# Patient Record
Sex: FEMALE | Race: WHITE | Employment: OTHER | URBAN - METROPOLITAN AREA
[De-identification: names, ages, dates, MRNs, and addresses within clinical notes are randomized per-mention and may not be internally consistent; named-entity substitution may affect disease eponyms.]

---

## 2017-01-16 ENCOUNTER — HOSPITAL ENCOUNTER (OUTPATIENT)
Dept: BONE DENSITY | Facility: HOSPITAL | Age: 57
Discharge: HOME | End: 2017-01-16
Attending: INTERNAL MEDICINE
Payer: COMMERCIAL

## 2017-01-16 ENCOUNTER — HOSPITAL ENCOUNTER (OUTPATIENT)
Dept: CV DIAGNOSTICS | Facility: HOSPITAL | Age: 57
Discharge: HOME | End: 2017-01-16
Attending: INTERNAL MEDICINE
Payer: COMMERCIAL

## 2017-01-16 DIAGNOSIS — M85.80 OSTEOPENIA: ICD-10-CM

## 2017-01-16 DIAGNOSIS — R00.2 PALPITATION: ICD-10-CM

## 2017-01-16 PROCEDURE — 93306 TTE W/DOPPLER COMPLETE: CPT | Performed by: INTERNAL MEDICINE

## 2017-01-16 PROCEDURE — 77080 DXA BONE DENSITY AXIAL: CPT

## 2017-01-16 PROCEDURE — 93306 TTE W/DOPPLER COMPLETE: CPT

## 2017-01-17 ENCOUNTER — TELEPHONE (OUTPATIENT)
Dept: INTERNAL MEDICINE CLINIC | Facility: CLINIC | Age: 57
End: 2017-01-17

## 2017-01-17 NOTE — TELEPHONE ENCOUNTER
Patient called to follow up on dexa scan and echo results. Per patient ok to leave detailed message if unable to answer. Also, patient had mammogram from [facility] two weeks ago.

## 2017-01-17 NOTE — TELEPHONE ENCOUNTER
Mammogram is benign without changes from prior. Would recheck in 1 year continue self breast exams every month. Echo do not have results back on. Bone density see prior telephone message.

## 2017-02-17 RX ORDER — PRAVASTATIN SODIUM 40 MG
TABLET ORAL
Qty: 90 TABLET | Refills: 0 | Status: SHIPPED | OUTPATIENT
Start: 2017-02-17 | End: 2017-06-21

## 2017-03-31 RX ORDER — TRIAMTERENE AND HYDROCHLOROTHIAZIDE 37.5; 25 MG/1; MG/1
TABLET ORAL
Qty: 45 TABLET | Refills: 1 | Status: SHIPPED | OUTPATIENT
Start: 2017-03-31 | End: 2017-05-15

## 2017-05-15 ENCOUNTER — OFFICE VISIT (OUTPATIENT)
Dept: INTERNAL MEDICINE CLINIC | Facility: CLINIC | Age: 57
End: 2017-05-15

## 2017-05-15 VITALS
TEMPERATURE: 98 F | BODY MASS INDEX: 25.84 KG/M2 | DIASTOLIC BLOOD PRESSURE: 58 MMHG | OXYGEN SATURATION: 96 % | HEIGHT: 64 IN | HEART RATE: 68 BPM | SYSTOLIC BLOOD PRESSURE: 118 MMHG | WEIGHT: 151.38 LBS

## 2017-05-15 DIAGNOSIS — E55.9 VITAMIN D DEFICIENCY: Primary | ICD-10-CM

## 2017-05-15 DIAGNOSIS — E78.00 HYPERCHOLESTEROLEMIA: ICD-10-CM

## 2017-05-15 DIAGNOSIS — I10 HYPERTENSION, BENIGN: ICD-10-CM

## 2017-05-15 DIAGNOSIS — M79.604 PAIN IN BOTH LOWER EXTREMITIES: ICD-10-CM

## 2017-05-15 DIAGNOSIS — M79.605 PAIN IN BOTH LOWER EXTREMITIES: ICD-10-CM

## 2017-05-15 PROCEDURE — 99214 OFFICE O/P EST MOD 30 MIN: CPT | Performed by: INTERNAL MEDICINE

## 2017-05-15 PROCEDURE — 99212 OFFICE O/P EST SF 10 MIN: CPT | Performed by: INTERNAL MEDICINE

## 2017-05-15 RX ORDER — TIZANIDINE 2 MG/1
2 TABLET ORAL NIGHTLY
Qty: 7 TABLET | Refills: 0 | Status: SHIPPED | OUTPATIENT
Start: 2017-05-15 | End: 2017-05-22

## 2017-05-15 RX ORDER — ATENOLOL 25 MG/1
TABLET ORAL
Qty: 180 TABLET | Refills: 1 | Status: SHIPPED | OUTPATIENT
Start: 2017-05-15 | End: 2018-08-28

## 2017-05-15 RX ORDER — TRIAMTERENE AND HYDROCHLOROTHIAZIDE 37.5; 25 MG/1; MG/1
TABLET ORAL
Qty: 45 TABLET | Refills: 1 | Status: SHIPPED | OUTPATIENT
Start: 2017-05-15 | End: 2018-04-11

## 2017-05-15 NOTE — PATIENT INSTRUCTIONS
ASSESSMENT/PLAN:   Vitamin d deficiency  (primary encounter diagnosis)    Hypertension, benign Good control. Careful with diet and excercise at least 30 minutes 3-4 times a week. Check blood pressures at different times on different days.  Can purchase ow

## 2017-05-15 NOTE — PROGRESS NOTES
HPI:    Patient ID: Noreen Pace is a 62year old female. HPI  Constant feeling pressure in legs. H/O back. No assoc. With time of day. Hypertension  Patient is here for follow up of hypertension. BP at home: not check.    Has been compliant with m Triamterene-HCTZ 37.5-25 MG Oral Tab TAKE 1/4 TABLET BY MOUTH DAILY Disp: 45 tablet Rfl: 1   Diclofenac Sodium 50 MG Oral Tab EC Take 1 tablet (50 mg total) by mouth 2 (two) times daily.  Disp: 14 tablet Rfl: 0   TiZANidine HCl 2 MG Oral Tab Take 1 tablet normal and breath sounds normal. No accessory muscle usage. No apnea, no tachypnea and no bradypnea. No respiratory distress. She has no decreased breath sounds. She has no wheezes. She has no rhonchi. She has no rales. She exhibits no tenderness.    Muscul Bake foods more and grill occasionally. Avoid fried foods. No salt. Use other seasonings. Hypercholesterolemia    Pain in both lower extremities Check venous U/S. Try diclofenac 50 mg 1 twice a day with food for 7 days.   Try tizanidine 2 mg 1 at night

## 2017-05-16 ENCOUNTER — TELEPHONE (OUTPATIENT)
Dept: INTERNAL MEDICINE CLINIC | Facility: CLINIC | Age: 57
End: 2017-05-16

## 2017-05-16 ENCOUNTER — HOSPITAL ENCOUNTER (OUTPATIENT)
Dept: ULTRASOUND IMAGING | Facility: HOSPITAL | Age: 57
Discharge: HOME | End: 2017-05-16
Attending: INTERNAL MEDICINE
Payer: COMMERCIAL

## 2017-05-16 DIAGNOSIS — M79.604 PAIN IN BOTH LOWER EXTREMITIES: ICD-10-CM

## 2017-05-16 DIAGNOSIS — M79.605 PAIN IN BOTH LOWER EXTREMITIES: ICD-10-CM

## 2017-05-16 PROCEDURE — 93970 EXTREMITY STUDY: CPT | Performed by: INTERNAL MEDICINE

## 2017-05-16 NOTE — TELEPHONE ENCOUNTER
Followed up with patient to ensure patient understood next steps. Pt verbalized understanding that she understood the plans that her and Dr. Leola Long discussed at her apt yesterday. Patient had not further questions or concerns at this time.

## 2017-05-16 NOTE — TELEPHONE ENCOUNTER
Advised patient she could go home and that we will follow up later this afternoon when Dr. Radhika Friedman is in the office and is able to review results.

## 2017-06-21 NOTE — TELEPHONE ENCOUNTER
Patient does not meet criteria for refill protocol for pravastatin last lipid 2015. Please advise if you will refill?

## 2017-06-22 RX ORDER — PRAVASTATIN SODIUM 40 MG
TABLET ORAL
Qty: 90 TABLET | Refills: 0 | Status: SHIPPED | OUTPATIENT
Start: 2017-06-22 | End: 2017-10-09

## 2017-07-24 RX ORDER — ACYCLOVIR 200 MG/1
CAPSULE ORAL
Qty: 21 CAPSULE | Refills: 0 | Status: SHIPPED | OUTPATIENT
Start: 2017-07-24 | End: 2018-08-28

## 2017-08-11 ENCOUNTER — TELEPHONE (OUTPATIENT)
Dept: INTERNAL MEDICINE CLINIC | Facility: CLINIC | Age: 57
End: 2017-08-11

## 2017-08-11 RX ORDER — ATENOLOL 25 MG/1
TABLET ORAL
Qty: 180 TABLET | Refills: 0 | Status: SHIPPED | OUTPATIENT
Start: 2017-08-11 | End: 2017-09-22

## 2017-08-11 NOTE — TELEPHONE ENCOUNTER
Last CMP 12/2016 in media Scanned.  Rx approved per protocol        Hypertensive Medications  Protocol Criteria:  · Appointment scheduled in the past 6 months or in the next 3 months  · BMP or CMP in the past 12 months  · Creatinine result < 2  Recent Outpa

## 2017-08-15 NOTE — TELEPHONE ENCOUNTER
Does pt. Have enough til then? or change to toprol XL 25 q12hrs. #60 and Careful with diet and excercise at least 30 minutes 3-4 times a week. Check blood pressures at different times on different days. Can purchase own blood pressure monitor.  If not, [facility]

## 2017-08-16 ENCOUNTER — TELEPHONE (OUTPATIENT)
Dept: INTERNAL MEDICINE CLINIC | Facility: CLINIC | Age: 57
End: 2017-08-16

## 2017-08-16 NOTE — TELEPHONE ENCOUNTER
Spoke with patient, she is unsure if she has enough to wait out the shortage however she will check her supply. She does not want to change medications.

## 2017-08-18 NOTE — TELEPHONE ENCOUNTER
Received call from [facility] re: Atenolol prescription. Informed them of patients' decision, asked pharmacy to call patient directly for f/u.

## 2017-08-22 NOTE — TELEPHONE ENCOUNTER
She wants to wait and check and call us if she is running like a week late with medicines we can probably end up changing it just temporary.

## 2017-08-22 NOTE — TELEPHONE ENCOUNTER
Patient informed of PCP instructions, per patient is on a wait list at [address] [place] [phone] but will call office if she has a week left of the medication to temporarily change. Patient denied any further questions at the time of call.

## 2017-09-08 ENCOUNTER — TELEPHONE (OUTPATIENT)
Dept: INTERNAL MEDICINE CLINIC | Facility: CLINIC | Age: 57
End: 2017-09-08

## 2017-09-08 RX ORDER — METOPROLOL SUCCINATE 25 MG/1
TABLET, EXTENDED RELEASE ORAL
Qty: 180 TABLET | Refills: 1 | Status: SHIPPED | OUTPATIENT
Start: 2017-09-08 | End: 2018-08-28

## 2017-09-08 RX ORDER — METOPROLOL SUCCINATE 25 MG/1
25 TABLET, EXTENDED RELEASE ORAL EVERY 12 HOURS
Qty: 60 TABLET | Refills: 1 | Status: SHIPPED | OUTPATIENT
Start: 2017-09-08 | End: 2017-09-08

## 2017-09-08 NOTE — TELEPHONE ENCOUNTER
Per pt requesting alternative to Atenolol. Pt states medication is still on back order.  Please advise

## 2017-09-08 NOTE — TELEPHONE ENCOUNTER
See prior message om 8-11-17 regarding changeing to toprol XL 25 Q12hrs. #60 with 1 Rf and F/U in 4 weeks. Careful with diet and excercise at least 30 minutes 3-4 times a week. Check blood pressures at different times on different days.  Can purchase own bl

## 2017-09-08 NOTE — TELEPHONE ENCOUNTER
Informed pt Dr. Isrrael Sales recommendations below. Pt verbalized understanding. Rx sent to the preferred pharmacy. No further questions or concerns.

## 2017-09-19 RX ORDER — PRAVASTATIN SODIUM 40 MG
40 TABLET ORAL
Qty: 90 TABLET | Refills: 0 | OUTPATIENT
Start: 2017-09-19

## 2017-09-19 NOTE — TELEPHONE ENCOUNTER
Dr. Viveros Ba, Rx request for Pravastatin Sodium 40mg FAILED Cholesterol Protocol. Please review.  Thank you    Cholesterol Medications  Protocol Criteria:  · Appointment scheduled in the past 12 months or in the next 3 months  · ALT & LDL on file in the past

## 2017-09-22 ENCOUNTER — TELEPHONE (OUTPATIENT)
Dept: INTERNAL MEDICINE CLINIC | Facility: CLINIC | Age: 57
End: 2017-09-22

## 2017-09-22 DIAGNOSIS — E53.8 VITAMIN B 12 DEFICIENCY: ICD-10-CM

## 2017-09-22 DIAGNOSIS — E78.00 HYPERCHOLESTEROLEMIA: Primary | ICD-10-CM

## 2017-09-22 DIAGNOSIS — Z00.00 ADULT GENERAL MEDICAL EXAMINATION: ICD-10-CM

## 2017-09-22 RX ORDER — ATENOLOL 25 MG/1
TABLET ORAL
Qty: 180 TABLET | Refills: 0 | Status: SHIPPED | OUTPATIENT
Start: 2017-09-22 | End: 2017-11-09

## 2017-09-22 NOTE — TELEPHONE ENCOUNTER
She found a pharmacy that has this medication on stock  Can you please send it to [facility] in [address]  [phone]      Current Outpatient Prescriptions:     •    ATENOLOL 25 MG Oral Tab, TAKE 1 TABLET BY MOUTH EVERY 12 HOURS, Disp: 180 tablet, Rfl: 0  •

## 2017-09-22 NOTE — TELEPHONE ENCOUNTER
Pt called requesting a trusted female dermatologist; someone that you would go to. Also, can you please place orders for cholesterol.

## 2017-09-27 RX ORDER — TRIAMTERENE AND HYDROCHLOROTHIAZIDE 37.5; 25 MG/1; MG/1
TABLET ORAL
Qty: 45 TABLET | Refills: 0 | Status: SHIPPED | OUTPATIENT
Start: 2017-09-27 | End: 2018-01-05

## 2017-09-27 NOTE — TELEPHONE ENCOUNTER
Dr. Aaron Lu, Rx request for Triamterene-HCTZ 37.5-25mg, FAILED Hypertension Protocol. UNABLE to fill. Please review. Thank you.     Hypertensive Medications  Protocol Criteria:  · Appointment scheduled in the past 6 months or in the next 3 months  · BMP o

## 2017-09-29 ENCOUNTER — APPOINTMENT (OUTPATIENT)
Dept: LAB | Facility: HOSPITAL | Age: 57
End: 2017-09-29
Attending: INTERNAL MEDICINE
Payer: COMMERCIAL

## 2017-09-29 DIAGNOSIS — E53.8 VITAMIN B 12 DEFICIENCY: ICD-10-CM

## 2017-09-29 DIAGNOSIS — E78.00 HYPERCHOLESTEROLEMIA: ICD-10-CM

## 2017-09-29 DIAGNOSIS — Z00.00 ADULT GENERAL MEDICAL EXAMINATION: ICD-10-CM

## 2017-09-29 PROCEDURE — 82607 VITAMIN B-12: CPT

## 2017-09-29 PROCEDURE — 86803 HEPATITIS C AB TEST: CPT

## 2017-09-29 PROCEDURE — 80053 COMPREHEN METABOLIC PANEL: CPT

## 2017-09-29 PROCEDURE — 80500 HEPATITIS A B + C PROFILE: CPT

## 2017-09-29 PROCEDURE — 80061 LIPID PANEL: CPT

## 2017-09-29 PROCEDURE — 86708 HEPATITIS A ANTIBODY: CPT

## 2017-09-29 PROCEDURE — 86704 HEP B CORE ANTIBODY TOTAL: CPT

## 2017-09-29 PROCEDURE — 36415 COLL VENOUS BLD VENIPUNCTURE: CPT

## 2017-09-29 PROCEDURE — 87340 HEPATITIS B SURFACE AG IA: CPT

## 2017-09-29 PROCEDURE — 86706 HEP B SURFACE ANTIBODY: CPT

## 2017-10-09 RX ORDER — PRAVASTATIN SODIUM 40 MG
40 TABLET ORAL
Qty: 90 TABLET | Refills: 0 | Status: SHIPPED | OUTPATIENT
Start: 2017-10-09 | End: 2018-01-05

## 2017-10-10 ENCOUNTER — TELEPHONE (OUTPATIENT)
Dept: INTERNAL MEDICINE CLINIC | Facility: CLINIC | Age: 57
End: 2017-10-10

## 2017-10-10 NOTE — TELEPHONE ENCOUNTER
Can't make it to her B12 inject this week it all.   Pt would like to have an rx for a b12 so she can take it to [facility]

## 2017-10-11 NOTE — TELEPHONE ENCOUNTER
Spoke with pt. She inquires if she can receive B12 at the [facility] clinic. Advised yes, transferred for nurse visit scheduling.

## 2017-10-13 ENCOUNTER — LAB ENCOUNTER (OUTPATIENT)
Dept: LAB | Facility: HOSPITAL | Age: 57
End: 2017-10-13
Attending: INTERNAL MEDICINE
Payer: COMMERCIAL

## 2017-10-13 DIAGNOSIS — R20.2 NUMBNESS AND TINGLING OF BOTH LEGS: ICD-10-CM

## 2017-10-13 DIAGNOSIS — R20.0 NUMBNESS AND TINGLING OF BOTH LEGS: ICD-10-CM

## 2017-10-20 ENCOUNTER — TELEPHONE (OUTPATIENT)
Dept: INTERNAL MEDICINE CLINIC | Facility: CLINIC | Age: 57
End: 2017-10-20

## 2017-10-20 RX ORDER — ACYCLOVIR 200 MG/1
CAPSULE ORAL
Qty: 90 CAPSULE | Refills: 1 | Status: SHIPPED | OUTPATIENT
Start: 2017-10-20 | End: 2018-11-23

## 2017-10-20 NOTE — TELEPHONE ENCOUNTER
Please advise regarding refill request. rx pending for review.      Refill Protocol Appointment Criteria  · Appointment scheduled in the past 6 months or in the next 3 months  Recent Outpatient Visits            5 months ago Vitamin D deficiency    [facility]

## 2017-10-20 NOTE — TELEPHONE ENCOUNTER
Current Outpatient Prescriptions:     •  ACYCLOVIR 200 MG Oral Cap, TAKE ONE CAPSULE BY MOUTH THREE TIMES DAILY, Disp: 21 capsule, Rfl: 0    Patient is in [place] requesting refill to be sent to that location ph# 8 Claudine Carreno

## 2017-11-09 RX ORDER — ATENOLOL 25 MG/1
TABLET ORAL
Qty: 180 TABLET | Refills: 0 | Status: SHIPPED | OUTPATIENT
Start: 2017-11-09 | End: 2018-02-06

## 2017-12-20 ENCOUNTER — TELEPHONE (OUTPATIENT)
Dept: INTERNAL MEDICINE CLINIC | Facility: CLINIC | Age: 57
End: 2017-12-20

## 2017-12-20 DIAGNOSIS — E53.8 VITAMIN B 12 DEFICIENCY: Primary | ICD-10-CM

## 2017-12-20 DIAGNOSIS — E78.00 HYPERCHOLESTEROLEMIA: ICD-10-CM

## 2017-12-20 RX ORDER — PIMECROLIMUS 10 MG/G
CREAM TOPICAL
COMMUNITY
Start: 2016-12-02 | End: 2018-08-28

## 2017-12-20 RX ORDER — HYDROCODONE BITARTRATE AND ACETAMINOPHEN 5; 325 MG/1; MG/1
1-2 TABLET ORAL
COMMUNITY
Start: 2014-10-12 | End: 2018-08-28

## 2017-12-20 RX ORDER — DICYCLOMINE HCL 20 MG
20 TABLET ORAL
COMMUNITY
Start: 2014-10-12 | End: 2018-08-28

## 2017-12-20 NOTE — TELEPHONE ENCOUNTER
Patient calling has follow up appointment scheduled in Feb, 2018 requesting order for labs as needed.  Ok to leave message

## 2017-12-22 ENCOUNTER — APPOINTMENT (OUTPATIENT)
Dept: LAB | Facility: HOSPITAL | Age: 57
End: 2017-12-22
Attending: INTERNAL MEDICINE
Payer: COMMERCIAL

## 2017-12-22 DIAGNOSIS — E78.00 HYPERCHOLESTEROLEMIA: ICD-10-CM

## 2017-12-22 DIAGNOSIS — E53.8 VITAMIN B 12 DEFICIENCY: ICD-10-CM

## 2017-12-22 PROBLEM — E87.6 HYPOKALEMIA: Status: ACTIVE | Noted: 2017-12-22

## 2017-12-22 PROCEDURE — 36415 COLL VENOUS BLD VENIPUNCTURE: CPT

## 2017-12-22 PROCEDURE — 82607 VITAMIN B-12: CPT

## 2017-12-22 PROCEDURE — 80053 COMPREHEN METABOLIC PANEL: CPT

## 2017-12-22 PROCEDURE — 80061 LIPID PANEL: CPT

## 2017-12-22 PROCEDURE — 83735 ASSAY OF MAGNESIUM: CPT | Performed by: INTERNAL MEDICINE

## 2018-01-05 ENCOUNTER — TELEPHONE (OUTPATIENT)
Dept: INTERNAL MEDICINE CLINIC | Facility: CLINIC | Age: 58
End: 2018-01-05

## 2018-01-05 ENCOUNTER — APPOINTMENT (OUTPATIENT)
Dept: LAB | Facility: HOSPITAL | Age: 58
End: 2018-01-05
Attending: INTERNAL MEDICINE
Payer: COMMERCIAL

## 2018-01-05 DIAGNOSIS — E87.6 HYPOKALEMIA: ICD-10-CM

## 2018-01-05 LAB
ANION GAP SERPL CALC-SCNC: 10 MMOL/L (ref 0–18)
BUN SERPL-MCNC: 10 MG/DL (ref 8–20)
BUN/CREAT SERPL: 13.2 (ref 10–20)
CALCIUM SERPL-MCNC: 9.4 MG/DL (ref 8.5–10.5)
CHLORIDE SERPL-SCNC: 104 MMOL/L (ref 95–110)
CO2 SERPL-SCNC: 27 MMOL/L (ref 22–32)
CREAT SERPL-MCNC: 0.76 MG/DL (ref 0.5–1.5)
GLUCOSE SERPL-MCNC: 96 MG/DL (ref 70–99)
MAGNESIUM SERPL-MCNC: 1.9 MG/DL (ref 1.8–2.5)
OSMOLALITY UR CALC.SUM OF ELEC: 291 MOSM/KG (ref 275–295)
POTASSIUM SERPL-SCNC: 3.1 MMOL/L (ref 3.3–5.1)
SODIUM SERPL-SCNC: 141 MMOL/L (ref 136–144)

## 2018-01-05 PROCEDURE — 80048 BASIC METABOLIC PNL TOTAL CA: CPT

## 2018-01-05 PROCEDURE — 83735 ASSAY OF MAGNESIUM: CPT

## 2018-01-05 PROCEDURE — 36415 COLL VENOUS BLD VENIPUNCTURE: CPT

## 2018-01-05 RX ORDER — PRAVASTATIN SODIUM 40 MG
40 TABLET ORAL
Qty: 90 TABLET | Refills: 0 | Status: SHIPPED | OUTPATIENT
Start: 2018-01-05 | End: 2018-02-20 | Stop reason: DRUGHIGH

## 2018-01-05 RX ORDER — TRIAMTERENE AND HYDROCHLOROTHIAZIDE 37.5; 25 MG/1; MG/1
TABLET ORAL
Qty: 45 TABLET | Refills: 0 | Status: SHIPPED | OUTPATIENT
Start: 2018-01-05 | End: 2018-07-21

## 2018-01-05 NOTE — TELEPHONE ENCOUNTER
Current Outpatient Prescriptions:     •  TRIAMTERENE-HCTZ 37.5-25 MG Oral Tab, TAKE 1/2 TABLET BY MOUTH DAILY, Disp: 45 tablet, Rfl: 0 REFILL     •  Pravastatin Sodium 40 MG Oral Tab, Take 1 tablet (40 mg total) by mouth once daily. , Disp: 90 tablet, Rfl

## 2018-01-05 NOTE — TELEPHONE ENCOUNTER
Called patient to see if she would be able to go for repeat potassium. Patient became very upset that no one had called her regarding the potassium and that a prescription was sent to the pharmacy.   Said office is very unprofessional, no one called, this

## 2018-01-05 NOTE — TELEPHONE ENCOUNTER
Can refill just a one-time with no extra refills. Patient had all these done December 22 she reviewed through my chart in December 23. All of it was written including that the fact that we sent the prescriptions over. On December 23.

## 2018-01-06 ENCOUNTER — TELEPHONE (OUTPATIENT)
Dept: INTERNAL MEDICINE CLINIC | Facility: CLINIC | Age: 58
End: 2018-01-06

## 2018-01-06 NOTE — TELEPHONE ENCOUNTER
Patient calling to let you know she had her potassium repeated yesterday at the [facility].

## 2018-01-13 ENCOUNTER — CHARTING TRANS (OUTPATIENT)
Dept: OTHER | Age: 58
End: 2018-01-13

## 2018-01-16 ENCOUNTER — TELEPHONE (OUTPATIENT)
Dept: INTERNAL MEDICINE CLINIC | Facility: CLINIC | Age: 58
End: 2018-01-16

## 2018-01-16 RX ORDER — AZITHROMYCIN 250 MG/1
TABLET, FILM COATED ORAL
Qty: 6 TABLET | Refills: 0 | Status: SHIPPED | OUTPATIENT
Start: 2018-01-16 | End: 2018-08-28

## 2018-01-16 NOTE — TELEPHONE ENCOUNTER
Patient with low grade fever,congestion,chills and sore throat for 4 days .  She [place] and is looking for a z pack if possible please send to   [facility]

## 2018-01-24 ENCOUNTER — TELEPHONE (OUTPATIENT)
Dept: INTERNAL MEDICINE CLINIC | Facility: CLINIC | Age: 58
End: 2018-01-24

## 2018-01-24 DIAGNOSIS — Z12.39 BREAST CANCER SCREENING: Primary | ICD-10-CM

## 2018-02-06 RX ORDER — ATENOLOL 25 MG/1
TABLET ORAL
Qty: 180 TABLET | Refills: 0 | Status: SHIPPED | OUTPATIENT
Start: 2018-02-06 | End: 2018-05-15

## 2018-02-07 RX ORDER — ATENOLOL 25 MG/1
TABLET ORAL
Qty: 180 TABLET | Refills: 0 | OUTPATIENT
Start: 2018-02-07

## 2018-02-11 RX ORDER — ATENOLOL 25 MG/1
TABLET ORAL
Qty: 180 TABLET | Refills: 0 | Status: SHIPPED | OUTPATIENT
Start: 2018-02-11 | End: 2018-05-15

## 2018-02-20 ENCOUNTER — OFFICE VISIT (OUTPATIENT)
Dept: INTERNAL MEDICINE CLINIC | Facility: CLINIC | Age: 58
End: 2018-02-20

## 2018-02-20 VITALS
HEIGHT: 64 IN | SYSTOLIC BLOOD PRESSURE: 125 MMHG | WEIGHT: 143 LBS | TEMPERATURE: 98 F | BODY MASS INDEX: 24.41 KG/M2 | HEART RATE: 54 BPM | DIASTOLIC BLOOD PRESSURE: 70 MMHG | OXYGEN SATURATION: 98 %

## 2018-02-20 DIAGNOSIS — Z12.39 BREAST CANCER SCREENING: ICD-10-CM

## 2018-02-20 DIAGNOSIS — E87.6 HYPOKALEMIA: ICD-10-CM

## 2018-02-20 DIAGNOSIS — E53.8 VITAMIN B 12 DEFICIENCY: Primary | ICD-10-CM

## 2018-02-20 DIAGNOSIS — E55.9 VITAMIN D DEFICIENCY: ICD-10-CM

## 2018-02-20 DIAGNOSIS — I10 HYPERTENSION, BENIGN: ICD-10-CM

## 2018-02-20 DIAGNOSIS — E78.00 HYPERCHOLESTEROLEMIA: ICD-10-CM

## 2018-02-20 LAB
ANION GAP SERPL CALC-SCNC: 8 MMOL/L (ref 0–18)
BUN SERPL-MCNC: 13 MG/DL (ref 8–20)
BUN/CREAT SERPL: 16.3 (ref 10–20)
CALCIUM SERPL-MCNC: 9.9 MG/DL (ref 8.5–10.5)
CHLORIDE SERPL-SCNC: 106 MMOL/L (ref 95–110)
CO2 SERPL-SCNC: 28 MMOL/L (ref 22–32)
CREAT SERPL-MCNC: 0.8 MG/DL (ref 0.5–1.5)
GLUCOSE SERPL-MCNC: 90 MG/DL (ref 70–99)
MAGNESIUM SERPL-MCNC: 2.1 MG/DL (ref 1.8–2.5)
OSMOLALITY UR CALC.SUM OF ELEC: 294 MOSM/KG (ref 275–295)
POTASSIUM SERPL-SCNC: 3.6 MMOL/L (ref 3.3–5.1)
SODIUM SERPL-SCNC: 142 MMOL/L (ref 136–144)
TSH SERPL-ACNC: 2.63 UIU/ML (ref 0.45–5.33)

## 2018-02-20 PROCEDURE — 99214 OFFICE O/P EST MOD 30 MIN: CPT | Performed by: INTERNAL MEDICINE

## 2018-02-20 PROCEDURE — 99212 OFFICE O/P EST SF 10 MIN: CPT | Performed by: INTERNAL MEDICINE

## 2018-02-20 RX ORDER — PRAVASTATIN SODIUM 20 MG
20 TABLET ORAL NIGHTLY
Qty: 30 TABLET | Refills: 4 | Status: SHIPPED | OUTPATIENT
Start: 2018-02-20 | End: 2018-12-07

## 2018-02-21 ENCOUNTER — TELEPHONE (OUTPATIENT)
Dept: INTERNAL MEDICINE CLINIC | Facility: CLINIC | Age: 58
End: 2018-02-21

## 2018-02-21 RX ORDER — ATENOLOL 25 MG/1
TABLET ORAL
Qty: 180 TABLET | Refills: 0 | OUTPATIENT
Start: 2018-02-21

## 2018-02-21 NOTE — PROGRESS NOTES
HPI:    Patient ID: Rosemary Bowden is a 62year old female. HPI  Hypertension  Patient is here for follow up of hypertension. BP at home: same. Has been compliant with medications.   Exercise level: moderately active and has been following low salt die MOUTH EVERY 12 HOURS Disp: 180 tablet Rfl: 0   ATENOLOL 25 MG Oral Tab TAKE 1 TABLET BY MOUTH EVERY 12 HOURS Disp: 180 tablet Rfl: 0   Triamterene-HCTZ 37.5-25 MG Oral Tab TAKE 1/2 TABLET BY MOUTH DAILY Disp: 45 tablet Rfl: 0   atenolol 25 MG Oral Tab Take tobacco: Never Used                      Alcohol use: No                 PHYSICAL EXAM:    Physical Exam   Constitutional: She is oriented to person, place, and time. She appears well-developed and well-nourished. No distress.    Neck: Trachea normal and ph occasionally. Avoid fried foods. No salt. Use other seasonings. Hypokalemia Check blood.        Orders Placed This Encounter      Basic Metabolic Panel (8) [E]      Magnesium [E]      TSH W Reflex To Free T4 [E]      Lipid Panel [E]      Comp Metabolic

## 2018-02-21 NOTE — PATIENT INSTRUCTIONS
ASSESSMENT/PLAN:   Vitamin b 12 deficiency  (primary encounter diagnosis) Lower. Check blood in 3 months. Hypercholesterolemia Try pravachol 20 mg and check blood in 3 moths. Breast cancer screening Check mammogram at [facility] per pt.  . Continue se

## 2018-02-22 NOTE — TELEPHONE ENCOUNTER
Can we call and find cost of A1C for pt. Pt.  Aware insurance might not cover but she wants to know cost. At [employer].

## 2018-02-22 NOTE — TELEPHONE ENCOUNTER
Per [facility], cost of A1C for pt's with no insurance coverage is $74.25. Pt notified, verbalized understanding and denied further questions.

## 2018-03-12 ENCOUNTER — TELEPHONE (OUTPATIENT)
Dept: INTERNAL MEDICINE CLINIC | Facility: CLINIC | Age: 58
End: 2018-03-12

## 2018-03-12 NOTE — TELEPHONE ENCOUNTER
Patient aware of mammogram result, instructed to continue self breast exam every month and repeat mammogram in one year, patient verbalized understanding.

## 2018-04-11 RX ORDER — TRIAMTERENE AND HYDROCHLOROTHIAZIDE 37.5; 25 MG/1; MG/1
TABLET ORAL
Qty: 45 TABLET | Refills: 0 | Status: SHIPPED | OUTPATIENT
Start: 2018-04-11 | End: 2018-07-20

## 2018-04-13 RX ORDER — TRIAMTERENE AND HYDROCHLOROTHIAZIDE 37.5; 25 MG/1; MG/1
TABLET ORAL
Qty: 22 TABLET | Refills: 0 | OUTPATIENT
Start: 2018-04-13

## 2018-05-15 RX ORDER — ATENOLOL 25 MG/1
TABLET ORAL
Qty: 180 TABLET | Refills: 0 | Status: SHIPPED | OUTPATIENT
Start: 2018-05-15 | End: 2018-08-16

## 2018-05-15 RX ORDER — ATENOLOL 25 MG/1
TABLET ORAL
Qty: 180 TABLET | Refills: 0 | OUTPATIENT
Start: 2018-05-15

## 2018-07-19 ENCOUNTER — TELEPHONE (OUTPATIENT)
Dept: INTERNAL MEDICINE CLINIC | Facility: CLINIC | Age: 58
End: 2018-07-19

## 2018-07-20 ENCOUNTER — TELEPHONE (OUTPATIENT)
Dept: INTERNAL MEDICINE CLINIC | Facility: CLINIC | Age: 58
End: 2018-07-20

## 2018-07-20 RX ORDER — TRIAMTERENE AND HYDROCHLOROTHIAZIDE 37.5; 25 MG/1; MG/1
TABLET ORAL
Qty: 45 TABLET | Refills: 0 | Status: SHIPPED | OUTPATIENT
Start: 2018-07-20 | End: 2018-08-28

## 2018-07-20 NOTE — TELEPHONE ENCOUNTER
Needs refill    Current Outpatient Prescriptions:   • Triamterene-HCTZ 37.5-25 MG Oral Tab, TAKE 1/4 TABLET BY MOUTH DAILY, Disp: 45 tablet, Rfl: 0

## 2018-07-21 ENCOUNTER — TELEPHONE (OUTPATIENT)
Dept: INTERNAL MEDICINE CLINIC | Facility: CLINIC | Age: 58
End: 2018-07-21

## 2018-07-21 RX ORDER — TRIAMTERENE AND HYDROCHLOROTHIAZIDE 37.5; 25 MG/1; MG/1
TABLET ORAL
Qty: 45 TABLET | Refills: 0 | Status: SHIPPED | OUTPATIENT
Start: 2018-07-21 | End: 2018-08-28

## 2018-08-16 ENCOUNTER — TELEPHONE (OUTPATIENT)
Dept: INTERNAL MEDICINE CLINIC | Facility: CLINIC | Age: 58
End: 2018-08-16

## 2018-08-16 RX ORDER — ATENOLOL 25 MG/1
TABLET ORAL
Qty: 180 TABLET | Refills: 0 | Status: SHIPPED | OUTPATIENT
Start: 2018-08-16 | End: 2018-08-28

## 2018-08-16 NOTE — TELEPHONE ENCOUNTER
Current Outpatient Prescriptions:   •  ATENOLOL 25 MG Oral Tab, TAKE 1 TABLET BY MOUTH EVERY 12 HOURS, Disp: 180 tablet, Rfl: 0 refill   •  atenolol 25 MG Oral Tab, TAKE 1 TABLET BY MOUTH EVERY 12 HOURS, Disp: 180 tablet, Rfl: 0 refill

## 2018-08-28 ENCOUNTER — OFFICE VISIT (OUTPATIENT)
Dept: INTERNAL MEDICINE CLINIC | Facility: CLINIC | Age: 58
End: 2018-08-28
Payer: COMMERCIAL

## 2018-08-28 VITALS
HEIGHT: 63.25 IN | OXYGEN SATURATION: 98 % | WEIGHT: 145.81 LBS | TEMPERATURE: 98 F | DIASTOLIC BLOOD PRESSURE: 66 MMHG | SYSTOLIC BLOOD PRESSURE: 128 MMHG | BODY MASS INDEX: 25.52 KG/M2 | HEART RATE: 56 BPM

## 2018-08-28 DIAGNOSIS — R21 RASH: ICD-10-CM

## 2018-08-28 DIAGNOSIS — R87.619 ABNORMAL CERVICAL PAPANICOLAOU SMEAR, UNSPECIFIED ABNORMAL PAP FINDING: ICD-10-CM

## 2018-08-28 DIAGNOSIS — M85.89 OSTEOPENIA OF MULTIPLE SITES: ICD-10-CM

## 2018-08-28 DIAGNOSIS — E55.9 VITAMIN D DEFICIENCY: ICD-10-CM

## 2018-08-28 DIAGNOSIS — E53.8 VITAMIN B 12 DEFICIENCY: Primary | ICD-10-CM

## 2018-08-28 DIAGNOSIS — I10 HYPERTENSION, BENIGN: ICD-10-CM

## 2018-08-28 DIAGNOSIS — E78.00 HYPERCHOLESTEROLEMIA: ICD-10-CM

## 2018-08-28 DIAGNOSIS — Z00.00 ADULT GENERAL MEDICAL EXAM: ICD-10-CM

## 2018-08-28 PROCEDURE — 99214 OFFICE O/P EST MOD 30 MIN: CPT | Performed by: INTERNAL MEDICINE

## 2018-08-28 RX ORDER — TRIAMTERENE AND HYDROCHLOROTHIAZIDE 37.5; 25 MG/1; MG/1
TABLET ORAL
Qty: 45 TABLET | Refills: 1 | Status: SHIPPED | OUTPATIENT
Start: 2018-08-28 | End: 2018-10-02

## 2018-08-28 RX ORDER — ATENOLOL 25 MG/1
TABLET ORAL
Qty: 180 TABLET | Refills: 0 | Status: SHIPPED | OUTPATIENT
Start: 2018-08-28 | End: 2019-12-31

## 2018-08-28 NOTE — ASSESSMENT & PLAN NOTE
Dexa done 2017: =====  CONCLUSION:   1. The patient has low bone mass with her bone density within the osteopenic range at the left hip and lumbar spine. .  2.  Since the last exam in 2011 the femoral neck density has not significantly changed,  the total

## 2018-08-29 NOTE — PROGRESS NOTES
HPI:    Patient ID: Lu Patricio is a 62year old female. HPI   Sees gyne. At [facility]. abNl pap 1-18. Triamcinlone oint. And no help with itching. Told dermatology that sclerosis. Wants to see different gyne. Found out adopted family recently.  Meet intolerance, polydipsia, polyphagia and polyuria. Genitourinary: Negative for decreased urine volume, difficulty urinating, dysuria, flank pain, frequency, hematuria and urgency.    Neurological: Negative for dizziness, tremors, seizures, syncope, weaknes oropharynx is clear and moist and mucous membranes are normal. No oropharyngeal exudate. Eyes: Conjunctivae are normal. No scleral icterus. Neck: Trachea normal and phonation normal. No thyroid mass and no thyromegaly present.    Cardiovascular: Normal minutes 3-4 times a week. Check blood pressures at different times on different days. Can purchase own blood pressure monitor. If not, check at local pharmacy. Bake foods more and grill occasionally. Avoid fried foods. No salt. Use other seasonings.      Os

## 2018-08-29 NOTE — PATIENT INSTRUCTIONS
ASSESSMENT/PLAN:   Vitamin b 12 deficiency  (primary encounter diagnosis) Check blood. Vitamin d deficiency Check blood. Hypercholesterolemia Check blood. Abnormal cervical papanicolaou smear, unspecified abnormal pap finding F/U gyne.      Rash W/REFL TO TITER/PATTERN/CASCADE [55370] [Q]    Meds This Visit:    Signed Prescriptions Disp Refills    Triamterene-HCTZ 37.5-25 MG Oral Tab 45 tablet 1      Sig: TAKE 1/2 TABLET BY MOUTH DAILY      atenolol 25 MG Oral Tab 180 tablet 0      Sig: TAKE 1 TAB

## 2018-09-03 PROBLEM — C44.42 SQUAMOUS CELL CANCER OF SCALP AND SKIN OF NECK: Status: ACTIVE | Noted: 2018-09-03

## 2018-09-07 ENCOUNTER — TELEPHONE (OUTPATIENT)
Dept: INTERNAL MEDICINE CLINIC | Facility: CLINIC | Age: 58
End: 2018-09-07

## 2018-09-07 NOTE — TELEPHONE ENCOUNTER
Patient calling would like to drop off pathology report will drop that off on Tuesday in the [facility] office

## 2018-09-14 ENCOUNTER — LAB ENCOUNTER (OUTPATIENT)
Dept: LAB | Facility: HOSPITAL | Age: 58
End: 2018-09-14
Attending: INTERNAL MEDICINE
Payer: COMMERCIAL

## 2018-09-14 DIAGNOSIS — Z00.00 ADULT GENERAL MEDICAL EXAM: ICD-10-CM

## 2018-09-14 DIAGNOSIS — M85.89 OSTEOPENIA OF MULTIPLE SITES: ICD-10-CM

## 2018-09-14 DIAGNOSIS — E78.00 HYPERCHOLESTEROLEMIA: ICD-10-CM

## 2018-09-14 DIAGNOSIS — E53.8 VITAMIN B 12 DEFICIENCY: ICD-10-CM

## 2018-09-14 DIAGNOSIS — E53.8 B12 DEFICIENCY: Primary | ICD-10-CM

## 2018-09-14 LAB
ALBUMIN SERPL BCP-MCNC: 4.1 G/DL (ref 3.5–4.8)
ALBUMIN/GLOB SERPL: 1.6 {RATIO} (ref 1–2)
ALP SERPL-CCNC: 74 U/L (ref 32–100)
ALT SERPL-CCNC: 26 U/L (ref 14–54)
ANION GAP SERPL CALC-SCNC: 9 MMOL/L (ref 0–18)
AST SERPL-CCNC: 25 U/L (ref 15–41)
BASOPHILS # BLD: 0 K/UL (ref 0–0.2)
BASOPHILS NFR BLD: 1 %
BILIRUB SERPL-MCNC: 0.5 MG/DL (ref 0.3–1.2)
BUN SERPL-MCNC: 17 MG/DL (ref 8–20)
BUN/CREAT SERPL: 21.8 (ref 10–20)
CALCIUM SERPL-MCNC: 9.6 MG/DL (ref 8.5–10.5)
CHLORIDE SERPL-SCNC: 106 MMOL/L (ref 95–110)
CHOLEST SERPL-MCNC: 215 MG/DL (ref 110–200)
CO2 SERPL-SCNC: 26 MMOL/L (ref 22–32)
CREAT SERPL-MCNC: 0.78 MG/DL (ref 0.5–1.5)
EOSINOPHIL # BLD: 0.4 K/UL (ref 0–0.7)
EOSINOPHIL NFR BLD: 6 %
ERYTHROCYTE [DISTWIDTH] IN BLOOD BY AUTOMATED COUNT: 12.9 % (ref 11–15)
GLOBULIN PLAS-MCNC: 2.6 G/DL (ref 2.5–3.7)
GLUCOSE SERPL-MCNC: 104 MG/DL (ref 70–99)
HCT VFR BLD AUTO: 42.8 % (ref 35–48)
HDLC SERPL-MCNC: 58 MG/DL
HGB BLD-MCNC: 14.4 G/DL (ref 12–16)
LDLC SERPL CALC-MCNC: 140 MG/DL (ref 0–99)
LYMPHOCYTES # BLD: 2.5 K/UL (ref 1–4)
LYMPHOCYTES NFR BLD: 37 %
MCH RBC QN AUTO: 30.6 PG (ref 27–32)
MCHC RBC AUTO-ENTMCNC: 33.7 G/DL (ref 32–37)
MCV RBC AUTO: 90.6 FL (ref 80–100)
MONOCYTES # BLD: 0.4 K/UL (ref 0–1)
MONOCYTES NFR BLD: 6 %
NEUTROPHILS # BLD AUTO: 3.3 K/UL (ref 1.8–7.7)
NEUTROPHILS NFR BLD: 51 %
NONHDLC SERPL-MCNC: 157 MG/DL
OSMOLALITY UR CALC.SUM OF ELEC: 294 MOSM/KG (ref 275–295)
PATIENT FASTING: YES
PLATELET # BLD AUTO: 247 K/UL (ref 140–400)
PMV BLD AUTO: 8.8 FL (ref 7.4–10.3)
POTASSIUM SERPL-SCNC: 3.6 MMOL/L (ref 3.3–5.1)
PROT SERPL-MCNC: 6.7 G/DL (ref 5.9–8.4)
RBC # BLD AUTO: 4.73 M/UL (ref 3.7–5.4)
SODIUM SERPL-SCNC: 141 MMOL/L (ref 136–144)
TRIGL SERPL-MCNC: 84 MG/DL (ref 1–149)
TSH SERPL-ACNC: 1.66 UIU/ML (ref 0.45–5.33)
VIT B12 SERPL-MCNC: 493 PG/ML (ref 181–914)
WBC # BLD AUTO: 6.6 K/UL (ref 4–11)

## 2018-09-14 PROCEDURE — 82607 VITAMIN B-12: CPT

## 2018-09-14 PROCEDURE — 86038 ANTINUCLEAR ANTIBODIES: CPT | Performed by: INTERNAL MEDICINE

## 2018-09-14 PROCEDURE — 84443 ASSAY THYROID STIM HORMONE: CPT

## 2018-09-14 PROCEDURE — 36415 COLL VENOUS BLD VENIPUNCTURE: CPT

## 2018-09-14 PROCEDURE — 80053 COMPREHEN METABOLIC PANEL: CPT

## 2018-09-14 PROCEDURE — 82306 VITAMIN D 25 HYDROXY: CPT

## 2018-09-14 PROCEDURE — 80061 LIPID PANEL: CPT

## 2018-09-14 PROCEDURE — 85025 COMPLETE CBC W/AUTO DIFF WBC: CPT

## 2018-09-17 LAB — 25(OH)D3 SERPL-MCNC: 31.9 NG/ML

## 2018-09-21 ENCOUNTER — LAB REQUISITION (OUTPATIENT)
Dept: LAB | Facility: HOSPITAL | Age: 58
End: 2018-09-21
Payer: COMMERCIAL

## 2018-09-21 ENCOUNTER — TELEPHONE (OUTPATIENT)
Dept: INTERNAL MEDICINE CLINIC | Facility: CLINIC | Age: 58
End: 2018-09-21

## 2018-09-21 DIAGNOSIS — R21 RASH AND NONSPECIFIC SKIN ERUPTION: Primary | ICD-10-CM

## 2018-09-21 DIAGNOSIS — R21 RASH AND OTHER NONSPECIFIC SKIN ERUPTION: ICD-10-CM

## 2018-09-21 PROCEDURE — 88321 CONSLTJ&REPRT SLD PREP ELSWR: CPT | Performed by: INTERNAL MEDICINE

## 2018-09-25 ENCOUNTER — TELEPHONE (OUTPATIENT)
Dept: INTERNAL MEDICINE CLINIC | Facility: CLINIC | Age: 58
End: 2018-09-25

## 2018-10-02 ENCOUNTER — APPOINTMENT (OUTPATIENT)
Dept: GENERAL RADIOLOGY | Facility: HOSPITAL | Age: 58
End: 2018-10-02
Payer: COMMERCIAL

## 2018-10-02 ENCOUNTER — APPOINTMENT (OUTPATIENT)
Dept: CT IMAGING | Facility: HOSPITAL | Age: 58
End: 2018-10-02
Attending: EMERGENCY MEDICINE
Payer: COMMERCIAL

## 2018-10-02 ENCOUNTER — TELEPHONE (OUTPATIENT)
Dept: INTERNAL MEDICINE CLINIC | Facility: CLINIC | Age: 58
End: 2018-10-02

## 2018-10-02 ENCOUNTER — HOSPITAL ENCOUNTER (EMERGENCY)
Facility: HOSPITAL | Age: 58
Discharge: HOME | End: 2018-10-02
Attending: EMERGENCY MEDICINE
Payer: COMMERCIAL

## 2018-10-02 VITALS
RESPIRATION RATE: 16 BRPM | HEIGHT: 63 IN | SYSTOLIC BLOOD PRESSURE: 97 MMHG | BODY MASS INDEX: 26.22 KG/M2 | OXYGEN SATURATION: 94 % | HEART RATE: 53 BPM | WEIGHT: 148 LBS | TEMPERATURE: 98 F | DIASTOLIC BLOOD PRESSURE: 60 MMHG

## 2018-10-02 DIAGNOSIS — R07.89 CHEST PAIN, NON-CARDIAC: Primary | ICD-10-CM

## 2018-10-02 PROCEDURE — 85025 COMPLETE CBC W/AUTO DIFF WBC: CPT | Performed by: EMERGENCY MEDICINE

## 2018-10-02 PROCEDURE — 85025 COMPLETE CBC W/AUTO DIFF WBC: CPT

## 2018-10-02 PROCEDURE — 75574 CT ANGIO HRT W/3D IMAGE: CPT | Performed by: EMERGENCY MEDICINE

## 2018-10-02 PROCEDURE — 84484 ASSAY OF TROPONIN QUANT: CPT | Performed by: EMERGENCY MEDICINE

## 2018-10-02 PROCEDURE — 71045 X-RAY EXAM CHEST 1 VIEW: CPT

## 2018-10-02 PROCEDURE — 99285 EMERGENCY DEPT VISIT HI MDM: CPT

## 2018-10-02 PROCEDURE — 96374 THER/PROPH/DIAG INJ IV PUSH: CPT

## 2018-10-02 PROCEDURE — 93010 ELECTROCARDIOGRAM REPORT: CPT | Performed by: EMERGENCY MEDICINE

## 2018-10-02 PROCEDURE — 93005 ELECTROCARDIOGRAM TRACING: CPT

## 2018-10-02 PROCEDURE — 80048 BASIC METABOLIC PNL TOTAL CA: CPT | Performed by: EMERGENCY MEDICINE

## 2018-10-02 RX ORDER — ALPRAZOLAM 0.25 MG/1
0.25 TABLET ORAL
Status: DISCONTINUED | OUTPATIENT
Start: 2018-10-02 | End: 2018-10-02

## 2018-10-02 RX ORDER — METOPROLOL TARTRATE 50 MG/1
50 TABLET, FILM COATED ORAL ONCE AS NEEDED
Status: DISCONTINUED | OUTPATIENT
Start: 2018-10-03 | End: 2018-10-02

## 2018-10-02 RX ORDER — METOPROLOL TARTRATE 50 MG/1
50 TABLET, FILM COATED ORAL ONCE
Status: DISCONTINUED | OUTPATIENT
Start: 2018-10-03 | End: 2018-10-02

## 2018-10-02 RX ORDER — METOPROLOL TARTRATE 5 MG/5ML
5 INJECTION INTRAVENOUS SEE ADMIN INSTRUCTIONS
Status: DISCONTINUED | OUTPATIENT
Start: 2018-10-02 | End: 2018-10-02

## 2018-10-02 RX ORDER — TRIAMTERENE AND HYDROCHLOROTHIAZIDE 37.5; 25 MG/1; MG/1
TABLET ORAL
Qty: 22 TABLET | Refills: 0 | Status: SHIPPED | OUTPATIENT
Start: 2018-10-02 | End: 2018-12-07

## 2018-10-02 RX ORDER — METOPROLOL TARTRATE 50 MG/1
100 TABLET, FILM COATED ORAL ONCE
Status: DISCONTINUED | OUTPATIENT
Start: 2018-10-02 | End: 2018-10-02

## 2018-10-02 RX ORDER — METOPROLOL TARTRATE 50 MG/1
50 TABLET, FILM COATED ORAL ONCE
Status: DISCONTINUED | OUTPATIENT
Start: 2018-10-02 | End: 2018-10-02

## 2018-10-02 RX ORDER — METOPROLOL TARTRATE 5 MG/5ML
INJECTION INTRAVENOUS
Status: DISCONTINUED
Start: 2018-10-02 | End: 2018-10-02

## 2018-10-02 RX ORDER — METOPROLOL TARTRATE 50 MG/1
50 TABLET, FILM COATED ORAL ONCE AS NEEDED
Status: DISCONTINUED | OUTPATIENT
Start: 2018-10-02 | End: 2018-10-02

## 2018-10-02 RX ORDER — ALPRAZOLAM 0.25 MG/1
0.25 TABLET ORAL ONCE AS NEEDED
Status: DISCONTINUED | OUTPATIENT
Start: 2018-10-03 | End: 2018-10-02

## 2018-10-02 RX ORDER — DILTIAZEM HYDROCHLORIDE 5 MG/ML
10 INJECTION INTRAVENOUS SEE ADMIN INSTRUCTIONS
Status: DISCONTINUED | OUTPATIENT
Start: 2018-10-02 | End: 2018-10-02

## 2018-10-02 RX ORDER — NITROGLYCERIN 0.4 MG/1
TABLET SUBLINGUAL
Status: COMPLETED
Start: 2018-10-02 | End: 2018-10-02

## 2018-10-02 RX ORDER — METOPROLOL TARTRATE 50 MG/1
100 TABLET, FILM COATED ORAL ONCE
Status: DISCONTINUED | OUTPATIENT
Start: 2018-10-03 | End: 2018-10-02

## 2018-10-02 RX ORDER — NITROGLYCERIN 0.4 MG/1
0.4 TABLET SUBLINGUAL ONCE
Status: COMPLETED | OUTPATIENT
Start: 2018-10-02 | End: 2018-10-02

## 2018-10-02 NOTE — IMAGING NOTE
TO CT FROM ER AT 0822  HX TAKEN PT CONSENTED IN ER VS HR 59 /58    18 GAUGE IV STARTED AT 0807 POC TESTING COMPLETED GFR > 60    CREATINE = 0.77    CTA ORDERED BY ER WAS PT GIVEN CTA  PREMEDS  NO     NO ORAL MEDS GIVEN     HR 64 METOPROLOL 5 MILLIGRA

## 2018-10-02 NOTE — ED PROVIDER NOTES
Patient Seen in: [facility] AND [facility]    History   Patient presents with:  Chest Pain Angina (cardiovascular)    Stated Complaint: Chest pain    HPI    The patient is a 77-year-old female who presents with left-sided squeezing chest megan supple. No JVD present. Cardiovascular: Normal rate, regular rhythm, normal heart sounds and intact distal pulses. Exam reveals no friction rub. No murmur heard. No systolic murmur is present.   Pulmonary/Chest: Effort normal and breath sounds normal. stable      MDM   Pulse Ox: 97%, Normal, room air    Cardiac Monitor: Pulse Readings from Last 1 Encounters:  10/02/18 : 53  , sinus, bradycardia    Radiology findings: Xr Chest Ap Portable  (cpt=71045)    Result Date: 10/2/2018  CONCLUSION:  1.  Normal exa possible for a visit in 2 days          Medications Prescribed:  Current Discharge Medication List    START taking these medications    Triamterene-HCTZ 37.5-25 MG Oral Tab  TAKE 1/4 TABLET BY MOUTH DAILY  Qty: 22 tablet Refills: 0  Comments: **Patient req

## 2018-10-04 NOTE — TELEPHONE ENCOUNTER
Spoke with pt. Gave Dr. Seferino Good Info. And no. Cannot do 1115 this Friday. Can do 35751 AMm on sat. 10-6-18. Scheduled by .

## 2018-10-06 ENCOUNTER — OFFICE VISIT (OUTPATIENT)
Dept: INTERNAL MEDICINE CLINIC | Facility: CLINIC | Age: 58
End: 2018-10-06

## 2018-10-06 VITALS
OXYGEN SATURATION: 98 % | TEMPERATURE: 98 F | BODY MASS INDEX: 24.75 KG/M2 | HEART RATE: 49 BPM | SYSTOLIC BLOOD PRESSURE: 124 MMHG | RESPIRATION RATE: 16 BRPM | DIASTOLIC BLOOD PRESSURE: 77 MMHG | WEIGHT: 145 LBS | HEIGHT: 64 IN

## 2018-10-06 DIAGNOSIS — R07.9 CHEST PAIN, UNSPECIFIED TYPE: Primary | ICD-10-CM

## 2018-10-06 DIAGNOSIS — R73.9 HYPERGLYCEMIA: ICD-10-CM

## 2018-10-06 DIAGNOSIS — R87.619 ABNORMAL CERVICAL PAPANICOLAOU SMEAR, UNSPECIFIED ABNORMAL PAP FINDING: ICD-10-CM

## 2018-10-06 DIAGNOSIS — I10 HYPERTENSION, BENIGN: ICD-10-CM

## 2018-10-06 DIAGNOSIS — R91.1 LUNG NODULE: ICD-10-CM

## 2018-10-06 DIAGNOSIS — E78.00 HYPERCHOLESTEROLEMIA: ICD-10-CM

## 2018-10-06 PROCEDURE — 36415 COLL VENOUS BLD VENIPUNCTURE: CPT | Performed by: INTERNAL MEDICINE

## 2018-10-06 PROCEDURE — 99214 OFFICE O/P EST MOD 30 MIN: CPT | Performed by: INTERNAL MEDICINE

## 2018-10-06 NOTE — PROGRESS NOTES
HPI:    Patient ID: Randolph Cowden is a 62year old female. HPI  Here for follow-up of pathology on Pap that patient had brought in slides. Also follow-up of chest pain. ER. Labs done and reportedly normal.  CT done. Here for follow-up.     Review hallucinations, self-injury, sleep disturbance and suicidal ideas. The patient is nervous/anxious. The patient is not hyperactive. All other systems reviewed and are negative.           Current Outpatient Medications:  Triamterene-HCTZ 37.5-25 MG Oral Ta retracted and not bulging. Tympanic membrane mobility is normal. No middle ear effusion. No hemotympanum. No decreased hearing is noted. Left Ear: Tympanic membrane, external ear and ear canal normal. No lacerations. No drainage, swelling or tenderness. and no bradypnea. She is not intubated. No respiratory distress. She has no decreased breath sounds. She has no wheezes. She has no rhonchi. She has no rales. She exhibits no tenderness and no bony tenderness. Breast exam refused.   Patient says GYN had a Left: No supraclavicular adenopathy present. Neurological: She is alert and oriented to person, place, and time. Skin: Skin is warm and dry. She is not diaphoretic. Psychiatric: She has a normal mood and affect.  Her behavior is normal.   Nursing note

## 2018-10-06 NOTE — PATIENT INSTRUCTIONS
Exam         ASSESSMENT/PLAN:   Chest pain, unspecified type  (primary encounter diagnosis) Less likely cadiovascular. But follow up with cardiology. ER if worsening or change in symptoms. Take an aspirin a day. Try PT. Shoulder excercises.      Lung nodul

## 2018-10-09 ENCOUNTER — TELEPHONE (OUTPATIENT)
Dept: INTERNAL MEDICINE CLINIC | Facility: CLINIC | Age: 58
End: 2018-10-09

## 2018-11-02 VITALS
WEIGHT: 145 LBS | TEMPERATURE: 98.9 F | BODY MASS INDEX: 24.75 KG/M2 | RESPIRATION RATE: 16 BRPM | SYSTOLIC BLOOD PRESSURE: 118 MMHG | HEIGHT: 64 IN | HEART RATE: 56 BPM | OXYGEN SATURATION: 98 % | DIASTOLIC BLOOD PRESSURE: 78 MMHG

## 2018-11-26 RX ORDER — ACYCLOVIR 200 MG/1
CAPSULE ORAL
Qty: 90 CAPSULE | Refills: 0 | Status: SHIPPED | OUTPATIENT
Start: 2018-11-26 | End: 2020-01-10

## 2018-12-07 RX ORDER — PRAVASTATIN SODIUM 20 MG
TABLET ORAL
Qty: 30 TABLET | Refills: 0 | Status: SHIPPED | OUTPATIENT
Start: 2018-12-07 | End: 2019-01-25

## 2018-12-07 RX ORDER — TRIAMTERENE AND HYDROCHLOROTHIAZIDE 37.5; 25 MG/1; MG/1
TABLET ORAL
Qty: 22 TABLET | Refills: 0 | Status: SHIPPED | OUTPATIENT
Start: 2018-12-07 | End: 2019-01-25

## 2018-12-07 NOTE — TELEPHONE ENCOUNTER
Triamteren-HCTZ 37.5-25mg PASSED HTN Protocol. Rx filled per protocol.      Hypertensive Medications  Protocol Criteria:  · Appointment scheduled in the past 6 months or in the next 3 months  · BMP or CMP in the past 12 months  · Creatinine result < 2  Rece

## 2018-12-27 ENCOUNTER — TELEPHONE (OUTPATIENT)
Dept: INTERNAL MEDICINE CLINIC | Facility: CLINIC | Age: 58
End: 2018-12-27

## 2018-12-27 RX ORDER — AZITHROMYCIN 250 MG/1
TABLET, FILM COATED ORAL
Qty: 6 TABLET | Refills: 0 | Status: SHIPPED | OUTPATIENT
Start: 2018-12-27 | End: 2019-04-19 | Stop reason: ALTCHOICE

## 2018-12-27 NOTE — TELEPHONE ENCOUNTER
Patient with a cold, cough ,sorethroat and low grade fever with some green phlegm. she is asking if you can prescribe z pack .

## 2018-12-27 NOTE — TELEPHONE ENCOUNTER
Start antibiotics ASAP and take as directed with food til done. Take probiotics while on antibiotics if can to prevent yeast infections.   See new Rx, sent to pharmacy please notify patientSee new Rx, f/u if not better

## 2018-12-28 NOTE — TELEPHONE ENCOUNTER
Message left on patient voice mail antibiotic sent to pharmacy and Dr. Iwona Brown instructions. If not better will have to come in.

## 2019-01-25 RX ORDER — PRAVASTATIN SODIUM 20 MG
TABLET ORAL
Qty: 30 TABLET | Refills: 0 | Status: SHIPPED | OUTPATIENT
Start: 2019-01-25 | End: 2019-02-21

## 2019-01-25 RX ORDER — TRIAMTERENE AND HYDROCHLOROTHIAZIDE 37.5; 25 MG/1; MG/1
TABLET ORAL
Qty: 22 TABLET | Refills: 0 | Status: SHIPPED | OUTPATIENT
Start: 2019-01-25 | End: 2019-05-21

## 2019-02-21 DIAGNOSIS — E78.00 HYPERCHOLESTEROLEMIA: Primary | ICD-10-CM

## 2019-02-21 RX ORDER — PRAVASTATIN SODIUM 20 MG
TABLET ORAL
Qty: 30 TABLET | Refills: 0 | Status: SHIPPED | OUTPATIENT
Start: 2019-02-21 | End: 2019-03-21

## 2019-03-04 PROBLEM — N90.4 LICHEN SCLEROSUS OF FEMALE GENITALIA: Status: ACTIVE | Noted: 2019-03-04

## 2019-03-04 PROBLEM — N95.2 ATROPHIC VAGINITIS: Status: ACTIVE | Noted: 2019-03-04

## 2019-03-04 PROBLEM — B97.7 HPV (HUMAN PAPILLOMA VIRUS) INFECTION: Status: ACTIVE | Noted: 2019-03-04

## 2019-03-04 PROBLEM — N87.9 CERVICAL DYSPLASIA: Status: ACTIVE | Noted: 2019-03-04

## 2019-03-21 RX ORDER — PRAVASTATIN SODIUM 20 MG
TABLET ORAL
Qty: 30 TABLET | Refills: 0 | Status: SHIPPED | OUTPATIENT
Start: 2019-03-21 | End: 2019-04-18

## 2019-03-21 RX ORDER — PRAVASTATIN SODIUM 20 MG
TABLET ORAL
Qty: 90 TABLET | Refills: 0 | OUTPATIENT
Start: 2019-03-21

## 2019-04-02 ENCOUNTER — PATIENT MESSAGE (OUTPATIENT)
Dept: INTERNAL MEDICINE CLINIC | Facility: CLINIC | Age: 59
End: 2019-04-02

## 2019-04-02 DIAGNOSIS — Z12.39 BREAST CANCER SCREENING: Primary | ICD-10-CM

## 2019-04-03 NOTE — TELEPHONE ENCOUNTER
From: Pearl Aguilera  To: Cliff Senior MD  Sent: 4/2/2019 6:11 PM CDT  Subject: Non-Urgent Selvin Smith Check,  Will you please put an order in for a mammogram for me please?   Thank you,  Pearl Aguilera

## 2019-04-18 RX ORDER — PRAVASTATIN SODIUM 20 MG
TABLET ORAL
Qty: 90 TABLET | Refills: 0 | Status: SHIPPED | OUTPATIENT
Start: 2019-04-18 | End: 2019-07-11

## 2019-04-19 ENCOUNTER — OFFICE VISIT (OUTPATIENT)
Dept: INTERNAL MEDICINE CLINIC | Facility: CLINIC | Age: 59
End: 2019-04-19
Payer: COMMERCIAL

## 2019-04-19 VITALS
TEMPERATURE: 99 F | OXYGEN SATURATION: 96 % | HEART RATE: 67 BPM | WEIGHT: 153 LBS | DIASTOLIC BLOOD PRESSURE: 81 MMHG | BODY MASS INDEX: 26.12 KG/M2 | SYSTOLIC BLOOD PRESSURE: 125 MMHG | HEIGHT: 64 IN

## 2019-04-19 DIAGNOSIS — R73.9 HYPERGLYCEMIA: ICD-10-CM

## 2019-04-19 DIAGNOSIS — E53.8 VITAMIN B 12 DEFICIENCY: ICD-10-CM

## 2019-04-19 DIAGNOSIS — R21 RASH: ICD-10-CM

## 2019-04-19 DIAGNOSIS — C44.90 SKIN CANCER: ICD-10-CM

## 2019-04-19 DIAGNOSIS — R91.1 PULMONARY NODULE: ICD-10-CM

## 2019-04-19 DIAGNOSIS — Z00.00 ADULT GENERAL MEDICAL EXAM: ICD-10-CM

## 2019-04-19 DIAGNOSIS — E78.00 HYPERCHOLESTEROLEMIA: ICD-10-CM

## 2019-04-19 DIAGNOSIS — M85.89 OSTEOPENIA OF MULTIPLE SITES: ICD-10-CM

## 2019-04-19 DIAGNOSIS — I10 HYPERTENSION, BENIGN: Primary | ICD-10-CM

## 2019-04-19 PROBLEM — K21.9 GASTROESOPHAGEAL REFLUX DISEASE WITHOUT ESOPHAGITIS: Status: ACTIVE | Noted: 2019-04-19

## 2019-04-19 PROCEDURE — 99396 PREV VISIT EST AGE 40-64: CPT | Performed by: INTERNAL MEDICINE

## 2019-04-19 RX ORDER — LANSOPRAZOLE 30 MG/1
30 CAPSULE, DELAYED RELEASE ORAL
Qty: 90 CAPSULE | Refills: 1 | Status: SHIPPED | OUTPATIENT
Start: 2019-04-19 | End: 2020-06-08

## 2019-04-19 RX ORDER — KETOCONAZOLE 20 MG/ML
SHAMPOO TOPICAL
Qty: 1 BOTTLE | Refills: 5 | Status: SHIPPED | OUTPATIENT
Start: 2019-04-19 | End: 2020-03-16

## 2019-04-19 NOTE — PROGRESS NOTES
HPI:    Patient ID: Kevin Portillo is a 61year old female.     HPI   Kevin Portillo is a 61year old female who presents for a complete physical exam.   HPI:   Patient presents with:  Hypertension: routine check-up  Test Results: follow-up on CT     Hypert 06:50 AM    TP 6.7 09/14/2018 06:50 AM    ALKPHO 74 09/14/2018 06:50 AM    AST 25 09/14/2018 06:50 AM    ALT 26 09/14/2018 06:50 AM    BILT 0.5 09/14/2018 06:50 AM    TSH 1.66 09/14/2018 06:50 AM        Lab Results   Component Value Date/Time    CHOLEST 21 Paternal Aunt    • Heart Disorder Paternal Aunt       Social History:  Social History    Socioeconomic History      Marital status:       Spouse name: Not on file      Number of children: Not on file      Years of education: Not on file      [facility] chills, diaphoresis, fatigue, fever and unexpected weight change.    HENT: Negative for congestion, dental problem, drooling, ear discharge, ear pain, facial swelling, hearing loss, mouth sores, nosebleeds, postnasal drip, rhinorrhea, sinus pressure, sinus Take 1 capsule (30 mg total) by mouth every morning before breakfast. Disp: 90 capsule Rfl: 1   Ketoconazole 2 % External Shampoo Apply once a day for 1 week and leave on for 5 minutes rubbing to scalp.   Then after 1 week use once to 3 times a week for [facility] Normocephalic and atraumatic. Right Ear: Tympanic membrane, external ear and ear canal normal. No lacerations. No drainage, swelling or tenderness. No foreign bodies. No mastoid tenderness.  Tympanic membrane is not injected, not scarred, not perforated, is not injected. Left conjunctiva has no hemorrhage. No scleral icterus. Right eye exhibits normal extraocular motion and no nystagmus. Left eye exhibits normal extraocular motion and no nystagmus. Neck: Trachea normal and phonation normal. Neck supple. submandibular, no preauricular, no posterior auricular and no occipital adenopathy present. Head (left side): No submental, no submandibular, no preauricular, no posterior auricular and no occipital adenopathy present.      She has no cervical adenop caffeine and caffinated beverages. Careful with acidic foods. Elevate head of bed. Wait 2 hrs. after eating before going to bed to allow digestion. Scalp itch. Try shampoo.      Orders Placed This Encounter      Hepatitis A B + C profile [E]      HIV

## 2019-04-19 NOTE — PATIENT INSTRUCTIONS
ASSESSMENT/PLAN:   Hypertension, benign  (primary encounter diagnosis) Good control. Careful with diet and excercise at least 30 minutes 3-4 times a week. Check blood pressures at different times on different days.  Can purchase own blood pressure monitor

## 2019-04-29 ENCOUNTER — HOSPITAL ENCOUNTER (OUTPATIENT)
Dept: MAMMOGRAPHY | Facility: HOSPITAL | Age: 59
Discharge: HOME | End: 2019-04-29
Attending: INTERNAL MEDICINE
Payer: COMMERCIAL

## 2019-04-29 DIAGNOSIS — Z12.39 BREAST CANCER SCREENING: ICD-10-CM

## 2019-04-29 PROCEDURE — 77067 SCR MAMMO BI INCL CAD: CPT | Performed by: INTERNAL MEDICINE

## 2019-04-29 PROCEDURE — 77063 BREAST TOMOSYNTHESIS BI: CPT | Performed by: INTERNAL MEDICINE

## 2019-05-21 RX ORDER — TRIAMTERENE AND HYDROCHLOROTHIAZIDE 37.5; 25 MG/1; MG/1
TABLET ORAL
Qty: 22 TABLET | Refills: 1 | Status: SHIPPED | OUTPATIENT
Start: 2019-05-21 | End: 2019-10-02

## 2019-05-21 NOTE — TELEPHONE ENCOUNTER
Refill passed per [facility] protocol.   Hypertensive Medications  Protocol Criteria:  · Appointment scheduled in the past 6 months or in the next 3 months  · BMP or CMP in the past 12 months  · Creatinine result < 2  Recent Outpatient Visits

## 2019-05-24 ENCOUNTER — HOSPITAL ENCOUNTER (OUTPATIENT)
Dept: ULTRASOUND IMAGING | Facility: HOSPITAL | Age: 59
Discharge: HOME | End: 2019-05-24
Attending: INTERNAL MEDICINE
Payer: COMMERCIAL

## 2019-05-24 ENCOUNTER — HOSPITAL ENCOUNTER (OUTPATIENT)
Dept: MAMMOGRAPHY | Facility: HOSPITAL | Age: 59
Discharge: HOME | End: 2019-05-24
Attending: INTERNAL MEDICINE
Payer: COMMERCIAL

## 2019-05-24 DIAGNOSIS — R92.8 ABNORMAL MAMMOGRAM: ICD-10-CM

## 2019-05-24 PROCEDURE — 77065 DX MAMMO INCL CAD UNI: CPT | Performed by: INTERNAL MEDICINE

## 2019-05-24 PROCEDURE — 76642 ULTRASOUND BREAST LIMITED: CPT | Performed by: INTERNAL MEDICINE

## 2019-05-24 PROCEDURE — 77061 BREAST TOMOSYNTHESIS UNI: CPT | Performed by: INTERNAL MEDICINE

## 2019-05-24 NOTE — IMAGING NOTE
Discussed recommended breast biopsy with patient. Patient is being recommended for stereotactic biopsy with krista  of the  Left  Breast  by Dr. Rick Gonsales  Pt history discussed as below: Denies taking any blood thinning meds . She runs a  from home . [facility] leave on for 5 minutes rubbing to scalp.   Then after 1 week use once to 3 times a week for maintenance., Disp: 1 Bottle, Rfl: 5  •  PRAVASTATIN SODIUM 20 MG Oral Tab, TAKE 1 TABLET(20 MG) BY MOUTH EVERY NIGHT, Disp: 90 tablet, Rfl: 0  •  triamcinolone acet After the clip is placed, the RN will place a dressing on the biopsy site. Additional mammography films will then be taken to assure correct placement of the marker.     Educated the patient they will be awake during this procedure and are able to drive th

## 2019-05-31 ENCOUNTER — HOSPITAL ENCOUNTER (OUTPATIENT)
Dept: MAMMOGRAPHY | Facility: HOSPITAL | Age: 59
Discharge: HOME | End: 2019-05-31
Attending: INTERNAL MEDICINE
Payer: COMMERCIAL

## 2019-05-31 VITALS — SYSTOLIC BLOOD PRESSURE: 117 MMHG | HEART RATE: 62 BPM | DIASTOLIC BLOOD PRESSURE: 77 MMHG | RESPIRATION RATE: 18 BRPM

## 2019-05-31 DIAGNOSIS — R92.8 ABNORMAL MAMMOGRAM: ICD-10-CM

## 2019-05-31 PROCEDURE — 88305 TISSUE EXAM BY PATHOLOGIST: CPT | Performed by: INTERNAL MEDICINE

## 2019-05-31 PROCEDURE — 19081 BX BREAST 1ST LESION STRTCTC: CPT | Performed by: INTERNAL MEDICINE

## 2019-05-31 PROCEDURE — 19499 UNLISTED PROCEDURE BREAST: CPT | Performed by: INTERNAL MEDICINE

## 2019-05-31 PROCEDURE — 19499 UNLISTED PROCEDURE BREAST: CPT

## 2019-05-31 NOTE — PROCEDURES
[facility] HOSP - [facility]  Procedure Note    Rachel Chambers Patient Status:  Outpatient    1960 MRN [id number]   Location [address] Attending López Barnes MD   Hosp Day # 0 PCP Serge Wall.  Karmen Dickson MD     Procedure:

## 2019-05-31 NOTE — IMAGING NOTE
725 am  hx take and as follows Pt history discussed as below: Denies taking any blood thinning meds . She runs a  from home . Dav Wylie   Pt history of biopsy: no                                                                               Family history of

## 2019-06-03 NOTE — IMAGING NOTE
. This  Nurse called at  26 am   contacted *Lulwill Rodas  and informed her  of the following results and recommendations     Six month diagnostic left breast mammogram follow-up is    from Dr Erica Gibson  radiologist.  See dictated report below:   She

## 2019-07-03 RX ORDER — ATENOLOL 25 MG/1
TABLET ORAL
Qty: 180 TABLET | Refills: 1 | Status: SHIPPED | OUTPATIENT
Start: 2019-07-03 | End: 2019-10-15

## 2019-07-03 NOTE — TELEPHONE ENCOUNTER
Refill passed per [facility] protocol.   Hypertensive Medications  Protocol Criteria:  · Appointment scheduled in the past 6 months or in the next 3 months  · BMP or CMP in the past 12 months  · Creatinine result < 2  Recent Outpatient Visits

## 2019-07-11 RX ORDER — PRAVASTATIN SODIUM 20 MG
TABLET ORAL
Qty: 90 TABLET | Refills: 1 | Status: SHIPPED | OUTPATIENT
Start: 2019-07-11 | End: 2019-12-24

## 2019-09-19 ENCOUNTER — TELEPHONE (OUTPATIENT)
Dept: INTERNAL MEDICINE CLINIC | Facility: CLINIC | Age: 59
End: 2019-09-19

## 2019-09-19 NOTE — TELEPHONE ENCOUNTER
Pt called stating she needs a follow up appt for October either on tuesdays after 6 or Friday mornings.  Please advise Please reply to pool: RONALD Paetl

## 2019-10-02 RX ORDER — TRIAMTERENE AND HYDROCHLOROTHIAZIDE 37.5; 25 MG/1; MG/1
TABLET ORAL
Qty: 90 TABLET | Refills: 1 | Status: SHIPPED | OUTPATIENT
Start: 2019-10-02 | End: 2020-04-17

## 2019-10-02 RX ORDER — TRIAMTERENE AND HYDROCHLOROTHIAZIDE 37.5; 25 MG/1; MG/1
TABLET ORAL
Qty: 45 TABLET | Refills: 0 | OUTPATIENT
Start: 2019-10-02

## 2019-10-02 NOTE — TELEPHONE ENCOUNTER
Spoke with maria de jesus at the pharmacy, pt had two scripts with different dosing, pt has refills left for the 1/4 tablet daily dosing, they will fill for patient, sent to Dr. Viraj Chatterjee as fyi and to note, 1/2 tablet dosing denied.

## 2019-10-02 NOTE — TELEPHONE ENCOUNTER
Please contact patient to verify dose, requested differs from current dose below.     Medication Quantity Refills Start End   TRIAMTERENE-HCTZ 37.5-25 MG Oral Tab 22 tablet 1 5/21/2019    Sig: Jose Mills 1/4 TABLET BY MOUTH DAILY     Route:   (none)     Order #

## 2019-10-15 ENCOUNTER — OFFICE VISIT (OUTPATIENT)
Dept: INTERNAL MEDICINE CLINIC | Facility: CLINIC | Age: 59
End: 2019-10-15
Payer: COMMERCIAL

## 2019-10-15 VITALS
HEART RATE: 59 BPM | OXYGEN SATURATION: 99 % | WEIGHT: 150 LBS | BODY MASS INDEX: 26 KG/M2 | DIASTOLIC BLOOD PRESSURE: 67 MMHG | TEMPERATURE: 98 F | SYSTOLIC BLOOD PRESSURE: 118 MMHG

## 2019-10-15 DIAGNOSIS — R21 RASH: ICD-10-CM

## 2019-10-15 DIAGNOSIS — M85.80 OSTEOPENIA, UNSPECIFIED LOCATION: ICD-10-CM

## 2019-10-15 DIAGNOSIS — R25.1 OCCASIONAL TREMORS: ICD-10-CM

## 2019-10-15 DIAGNOSIS — J02.9 SORE THROAT: ICD-10-CM

## 2019-10-15 DIAGNOSIS — E78.00 HYPERCHOLESTEROLEMIA: Primary | ICD-10-CM

## 2019-10-15 DIAGNOSIS — I10 HYPERTENSION, BENIGN: ICD-10-CM

## 2019-10-15 DIAGNOSIS — E53.8 VITAMIN B 12 DEFICIENCY: ICD-10-CM

## 2019-10-15 DIAGNOSIS — R73.9 HYPERGLYCEMIA: ICD-10-CM

## 2019-10-15 PROCEDURE — 99215 OFFICE O/P EST HI 40 MIN: CPT | Performed by: INTERNAL MEDICINE

## 2019-10-15 RX ORDER — CLOBETASOL PROPIONATE 0.05 G/100ML
SHAMPOO TOPICAL
Qty: 118 ML | Refills: 1 | Status: SHIPPED | OUTPATIENT
Start: 2019-10-15 | End: 2020-03-16

## 2019-10-15 NOTE — PROGRESS NOTES
HPI:    Patient ID: Jono Fontaine is a 61year old female. HPI   Fu shot refused. Hypertension  Patient is here for follow up of hypertension. BP at home: not check. Has been compliant with medications.   Exercise level: moderately active and has Gastrointestinal: Negative for abdominal distention, abdominal pain and nausea. Endocrine: Negative for cold intolerance, heat intolerance, polydipsia, polyphagia and polyuria. Genitourinary: Negative for dysuria. Skin: Positive for rash.    Allergic/ atenolol 25 MG Oral Tab, TAKE 1 TABLET BY MOUTH EVERY 12 HOURS, Disp: 180 tablet, Rfl: 0  Clobetasol Propionate 0.05 % External Ointment, RHYS EXT AA BID FOR 2 WKS, Disp: , Rfl: 0      Allergies:  Kiwi Extract              Latex                   ITCHING, R Left Ear: Tympanic membrane, external ear and ear canal normal. No lacerations. No drainage, swelling or tenderness. No foreign bodies. No mastoid tenderness.  Tympanic membrane is not injected, not scarred, not perforated, not erythematous, not retracted a Abdominal: Soft. There is no tenderness. Musculoskeletal:         General: No edema.       Right shoulder: She exhibits normal range of motion, no tenderness, no bony tenderness, no swelling, no effusion, no crepitus, no deformity, no laceration, no pain, n Left: No supraclavicular adenopathy present. Neurological: She is alert and oriented to person, place, and time. She displays no atrophy and no tremor. She exhibits normal muscle tone.    Reflex Scores:       Bicep reflexes are 2+ on the right side a Sig: Use q12hrs.        Imaging & Referrals:  XR DEXA BONE DENSITOMETRY (CPT=19180)        NI#[id number]

## 2019-10-16 ENCOUNTER — PATIENT MESSAGE (OUTPATIENT)
Dept: INTERNAL MEDICINE CLINIC | Facility: CLINIC | Age: 59
End: 2019-10-16

## 2019-10-16 DIAGNOSIS — Z78.0 ENCOUNTER FOR OSTEOPOROSIS SCREENING IN ASYMPTOMATIC POSTMENOPAUSAL PATIENT: Primary | ICD-10-CM

## 2019-10-16 DIAGNOSIS — Z13.820 ENCOUNTER FOR OSTEOPOROSIS SCREENING IN ASYMPTOMATIC POSTMENOPAUSAL PATIENT: Primary | ICD-10-CM

## 2019-10-16 NOTE — PATIENT INSTRUCTIONS
ASSESSMENT/PLAN:   Hypercholesterolemia  (primary encounter diagnosis) Check blood. Hyperglycemia Check blood. Hypertension, benign Good control. Careful with diet and excercise at least 30 minutes 3-4 times a week.  Check blood pressures at differe

## 2019-10-16 NOTE — TELEPHONE ENCOUNTER
From: Martha Mathew  To: Jayme Castleman. Jesus Lopez MD  Sent: 10/16/2019 10:57 AM CDT  Subject: Other    I spoke with [employer]. They state a diagnostic code of m85.80 was used for my dexascan order which is a medical diagnosis rather than a preventative one.  What shea

## 2019-10-18 ENCOUNTER — APPOINTMENT (OUTPATIENT)
Dept: LAB | Facility: HOSPITAL | Age: 59
End: 2019-10-18
Attending: INTERNAL MEDICINE
Payer: COMMERCIAL

## 2019-10-18 DIAGNOSIS — E78.00 HYPERCHOLESTEROLEMIA: ICD-10-CM

## 2019-10-18 DIAGNOSIS — E53.8 VITAMIN B 12 DEFICIENCY: ICD-10-CM

## 2019-10-18 DIAGNOSIS — R25.1 OCCASIONAL TREMORS: ICD-10-CM

## 2019-10-18 DIAGNOSIS — M85.89 OSTEOPENIA OF MULTIPLE SITES: ICD-10-CM

## 2019-10-18 DIAGNOSIS — Z00.00 ADULT GENERAL MEDICAL EXAM: ICD-10-CM

## 2019-10-18 DIAGNOSIS — R73.9 HYPERGLYCEMIA: ICD-10-CM

## 2019-10-18 PROCEDURE — 80500 HEPATITIS A B + C PROFILE: CPT

## 2019-10-18 PROCEDURE — 86803 HEPATITIS C AB TEST: CPT

## 2019-10-18 PROCEDURE — 80061 LIPID PANEL: CPT

## 2019-10-18 PROCEDURE — 83036 HEMOGLOBIN GLYCOSYLATED A1C: CPT

## 2019-10-18 PROCEDURE — 86706 HEP B SURFACE ANTIBODY: CPT

## 2019-10-18 PROCEDURE — 87340 HEPATITIS B SURFACE AG IA: CPT

## 2019-10-18 PROCEDURE — 82607 VITAMIN B-12: CPT

## 2019-10-18 PROCEDURE — 36415 COLL VENOUS BLD VENIPUNCTURE: CPT

## 2019-10-18 PROCEDURE — 80053 COMPREHEN METABOLIC PANEL: CPT

## 2019-10-18 PROCEDURE — 86704 HEP B CORE ANTIBODY TOTAL: CPT

## 2019-10-18 PROCEDURE — 84443 ASSAY THYROID STIM HORMONE: CPT

## 2019-10-18 PROCEDURE — 86708 HEPATITIS A ANTIBODY: CPT

## 2019-10-18 PROCEDURE — 82306 VITAMIN D 25 HYDROXY: CPT

## 2019-10-18 PROCEDURE — 83735 ASSAY OF MAGNESIUM: CPT

## 2019-11-06 ENCOUNTER — PATIENT MESSAGE (OUTPATIENT)
Dept: INTERNAL MEDICINE CLINIC | Facility: CLINIC | Age: 59
End: 2019-11-06

## 2019-11-07 ENCOUNTER — TELEPHONE (OUTPATIENT)
Dept: INTERNAL MEDICINE CLINIC | Facility: CLINIC | Age: 59
End: 2019-11-07

## 2019-11-07 NOTE — TELEPHONE ENCOUNTER
From: Hermes Rodas  To: Taylor Yañez MD  Sent: 11/6/2019 12:46 PM CST  Subject: Referral Request    Hi Dr. Kinsey Yañez-  I have scheduled my CT of the chest with contrast and Dexascan at [address] at their [facility] location on Friday, Nov

## 2019-11-07 NOTE — TELEPHONE ENCOUNTER
Prior authorization for CT Scan of chest was approved by patients insurance.  [address] [place] [address] called and CT Scan PA approved from 11-7-19 to 12-22-20, reference # is [id number], message left for pt regarding this PA and [facility]

## 2019-11-07 NOTE — TELEPHONE ENCOUNTER
[place] [facility] in [facility] needs authorization for CT of chest.  Scheduled November 29, 2019 there are number is [id number].

## 2019-11-22 ENCOUNTER — HOSPITAL ENCOUNTER (OUTPATIENT)
Dept: ULTRASOUND IMAGING | Facility: HOSPITAL | Age: 59
Discharge: HOME | End: 2019-11-22
Attending: INTERNAL MEDICINE
Payer: COMMERCIAL

## 2019-11-22 ENCOUNTER — HOSPITAL ENCOUNTER (OUTPATIENT)
Dept: MAMMOGRAPHY | Facility: HOSPITAL | Age: 59
Discharge: HOME | End: 2019-11-22
Attending: INTERNAL MEDICINE
Payer: COMMERCIAL

## 2019-11-22 DIAGNOSIS — R92.8 ABNORMAL MAMMOGRAM OF LEFT BREAST: ICD-10-CM

## 2019-11-22 PROCEDURE — 77065 DX MAMMO INCL CAD UNI: CPT | Performed by: INTERNAL MEDICINE

## 2019-11-22 PROCEDURE — 76642 ULTRASOUND BREAST LIMITED: CPT | Performed by: INTERNAL MEDICINE

## 2019-11-22 PROCEDURE — 77061 BREAST TOMOSYNTHESIS UNI: CPT | Performed by: INTERNAL MEDICINE

## 2019-11-25 ENCOUNTER — PATIENT MESSAGE (OUTPATIENT)
Dept: INTERNAL MEDICINE CLINIC | Facility: CLINIC | Age: 59
End: 2019-11-25

## 2019-11-25 DIAGNOSIS — R92.8 ABNORMAL MAMMOGRAM: Primary | ICD-10-CM

## 2019-11-25 NOTE — TELEPHONE ENCOUNTER
Dr. Jose E Hollingsworth, pt had [address] on 11/22    This was your note to her via MyChart which she did read    Patient Result Comments     Viewed by Radha Parikh on 11/23/2019  9:05 PM   Written by Hugo Ley MD on 11/23/2019  2:02 PM   Mammogram shows in

## 2019-11-25 NOTE — TELEPHONE ENCOUNTER
From: Gina Navarrete  To: Bascom Litten.  Farnaz Tidwell MD  Sent: 11/25/2019 11:15 AM CST  Subject: Test Results Question    Hi Dr. Farnaz Tidwell,  I would like to further discuss the results of my recent mammogram and ultrasound of the left breast.  Evenings after 5:30pm ar

## 2019-12-01 ENCOUNTER — TELEPHONE (OUTPATIENT)
Dept: INTERNAL MEDICINE CLINIC | Facility: CLINIC | Age: 59
End: 2019-12-01

## 2019-12-01 DIAGNOSIS — R93.89 ABNORMAL CT OF THE CHEST: Primary | ICD-10-CM

## 2019-12-01 NOTE — TELEPHONE ENCOUNTER
CT shows a small nodule on the right upper lobe. Would recheck another CT in 1 year. Orders written. Also seen is a 5 mm noncalcified stone in the right kidney which is not obstructing or blocking. Increase fluids. Away from soda pop.   Would strain ur

## 2019-12-02 ENCOUNTER — TELEPHONE (OUTPATIENT)
Dept: INTERNAL MEDICINE CLINIC | Facility: CLINIC | Age: 59
End: 2019-12-02

## 2019-12-02 ENCOUNTER — MED REC SCAN ONLY (OUTPATIENT)
Dept: INTERNAL MEDICINE CLINIC | Facility: CLINIC | Age: 59
End: 2019-12-02

## 2019-12-02 NOTE — TELEPHONE ENCOUNTER
Discussed with pt. About ct results and dexa and given urology no. Cannot tolerate calcium. Try calcium and vitamin D gummy. Left sterile container at  for pt. To .

## 2019-12-02 NOTE — TELEPHONE ENCOUNTER
Dr Lisette Salas, please call patient. Called patient. Gave patient first information, she asked if this is stable from prior CT scan.  I could not find the CT scan in the chart and as I said this to her, she became dissatisfied that I could not answer her mult

## 2019-12-02 NOTE — TELEPHONE ENCOUNTER
Bone density done shows some osteopenia the bones which is thinner bones than normal.  Mild/moderate to severe osteopenia. Take calcium 600 every 12 hrs. With vitamin D. Excerise at least 30 minutes 3-4 times a week.   Would recheck another bone density in 2

## 2019-12-20 ENCOUNTER — OFFICE VISIT (OUTPATIENT)
Dept: SURGERY | Facility: CLINIC | Age: 59
End: 2019-12-20
Payer: COMMERCIAL

## 2019-12-20 VITALS
BODY MASS INDEX: 25 KG/M2 | RESPIRATION RATE: 20 BRPM | SYSTOLIC BLOOD PRESSURE: 119 MMHG | WEIGHT: 144 LBS | HEART RATE: 50 BPM | TEMPERATURE: 99 F | DIASTOLIC BLOOD PRESSURE: 71 MMHG

## 2019-12-20 DIAGNOSIS — N60.02 BREAST CYST, LEFT: Primary | ICD-10-CM

## 2019-12-20 PROCEDURE — 99244 OFF/OP CNSLTJ NEW/EST MOD 40: CPT | Performed by: SURGERY

## 2019-12-23 ENCOUNTER — TELEPHONE (OUTPATIENT)
Dept: INTERNAL MEDICINE CLINIC | Facility: CLINIC | Age: 59
End: 2019-12-23

## 2019-12-24 RX ORDER — PRAVASTATIN SODIUM 20 MG
TABLET ORAL
Qty: 90 TABLET | Refills: 1 | Status: SHIPPED | OUTPATIENT
Start: 2019-12-24 | End: 2020-04-17

## 2019-12-24 NOTE — TELEPHONE ENCOUNTER
----- Message from Chu Jones sent at 12/23/2019  6:19 AM CST -----  Regarding: Other  Contact: [phone]  Good morning Dr. Lew Hurd. Can you please prescribe an antibiotic for me? I have a fever,( highest 101 degrees.  Mostly low grade) weakness, sor

## 2019-12-24 NOTE — TELEPHONE ENCOUNTER
Refill passed per [facility], [facility] protocol.   Cholesterol Medications  Protocol Criteria:  · Appointment scheduled in the past 12 months or in the next 3 months  · ALT & LDL on file in the past 12 months  · ALT result < 80  · LDL result <130   Recent Outpat

## 2019-12-31 RX ORDER — ATENOLOL 25 MG/1
TABLET ORAL
Qty: 180 TABLET | Refills: 1 | Status: SHIPPED | OUTPATIENT
Start: 2019-12-31 | End: 2020-02-17

## 2019-12-31 NOTE — TELEPHONE ENCOUNTER
LR 8-28-18 # 180  pls advise, thanks in advance.        Hypertensive Medications  Protocol Criteria:  · Appointment scheduled in the past 6 months or in the next 3 months  · BMP or CMP in the past 12 months  · Creatinine result < 2  Recent Outpatient Visits

## 2020-01-10 RX ORDER — ACYCLOVIR 200 MG/1
CAPSULE ORAL
Qty: 90 CAPSULE | Refills: 0 | Status: SHIPPED | OUTPATIENT
Start: 2020-01-10 | End: 2020-03-16

## 2020-01-10 NOTE — TELEPHONE ENCOUNTER
Review pended refill request as it does not fall under a protocol.     Last Rx: 11/26/18 #90#0  LOV: 2 months ago

## 2020-02-02 ENCOUNTER — APPOINTMENT (OUTPATIENT)
Dept: LAB | Facility: HOSPITAL | Age: 60
End: 2020-02-02
Attending: INTERNAL MEDICINE
Payer: COMMERCIAL

## 2020-02-02 DIAGNOSIS — R93.89 ABNORMAL CT OF THE CHEST: ICD-10-CM

## 2020-02-02 PROCEDURE — 82365 CALCULUS SPECTROSCOPY: CPT

## 2020-02-07 ENCOUNTER — OFFICE VISIT (OUTPATIENT)
Dept: SURGERY | Facility: CLINIC | Age: 60
End: 2020-02-07
Payer: COMMERCIAL

## 2020-02-07 VITALS
SYSTOLIC BLOOD PRESSURE: 128 MMHG | DIASTOLIC BLOOD PRESSURE: 78 MMHG | RESPIRATION RATE: 12 BRPM | HEIGHT: 64 IN | WEIGHT: 144 LBS | TEMPERATURE: 98 F | HEART RATE: 62 BPM | BODY MASS INDEX: 24.59 KG/M2

## 2020-02-07 DIAGNOSIS — R35.1 NOCTURIA: ICD-10-CM

## 2020-02-07 DIAGNOSIS — N20.0 KIDNEY STONE ON RIGHT SIDE: Primary | ICD-10-CM

## 2020-02-07 PROCEDURE — 99244 OFF/OP CNSLTJ NEW/EST MOD 40: CPT | Performed by: UROLOGY

## 2020-02-07 NOTE — PATIENT INSTRUCTIONS
Flora Hahn M.D.      1.  Complete urinalysis urine test and also blood test for uric acid during the next 10 days--we will notify you of the results    2.   KUB plain x-ray--milk of magnesia 2 ounces 6 PM evening before th once or twice a year may be the best way to monitor this, but check with your primary physician.     5.  If your KUB plain x-ray unexpectedly shows multiple kidney stones, we would then likely want you to do a metabolic 24 urine collection study, and intric

## 2020-02-07 NOTE — PROGRESS NOTES
Noelle Jeff is a 61year old female. Reason for Consultation:       History provided by patient. Referred by Dr. Meena Kim.        History of Present Illness:       Kidney stone   Problem started with RIGHT kidney stone being found incidentally on [address] at [facility]. S/P Moh's. Past Surgical History:   Procedure Laterality Date   •       X 2.        Family History   Adopted: Yes   Problem Relation Age of Onset   • Heart Disorder Father    • Lipids Paternal Aunt    • Heart Disorder Pa abnormal sleep patterns, increased activity, polydipsia and polyphagia    Allergic/Immuno:  Negative for environmental allergies and food allergies  Cardiovascular:  Negative for cool extremity and irregular heartbeat/palpitations  Constitutional:  Negativ CONTRAST ([address]) = Impression, Sub-5 mm noncalcified stone in the upper pole of RIGHT kidney           ASSESSMENT/PLAN:      Kidney stone on right side  (primary encounter diagnosis)     (N20.0) Kidney stone on right side  (primary enco calcium-containing stones. We will notify you of the results    3. I am asking my staff to give you my handout on food and kidney stone prevention; please try to follow that    4.  To lower your chances of developing kidney stone or having  existing stone if you have any metabolic disorder predisposing to kidney stone formation.     6.    If the above work-up reveals only 1 stone that is 5 mm no larger, your opinion at this time would be to likely watch the stone; under those circumstances, we would want to

## 2020-02-13 RX ORDER — ATENOLOL 25 MG/1
TABLET ORAL
Qty: 180 TABLET | Refills: 1 | OUTPATIENT
Start: 2020-02-13

## 2020-02-14 ENCOUNTER — HOSPITAL ENCOUNTER (OUTPATIENT)
Dept: GENERAL RADIOLOGY | Facility: HOSPITAL | Age: 60
Discharge: HOME | End: 2020-02-14
Attending: UROLOGY
Payer: COMMERCIAL

## 2020-02-14 ENCOUNTER — APPOINTMENT (OUTPATIENT)
Dept: LAB | Facility: HOSPITAL | Age: 60
End: 2020-02-14
Attending: UROLOGY
Payer: COMMERCIAL

## 2020-02-14 DIAGNOSIS — N20.0 KIDNEY STONE ON RIGHT SIDE: ICD-10-CM

## 2020-02-14 LAB
BILIRUB UR QL: NEGATIVE
CLARITY UR: CLEAR
COLOR UR: YELLOW
GLUCOSE UR-MCNC: NEGATIVE MG/DL
HGB UR QL STRIP.AUTO: NEGATIVE
KETONES UR-MCNC: NEGATIVE MG/DL
LEUKOCYTE ESTERASE UR QL STRIP.AUTO: NEGATIVE
NITRITE UR QL STRIP.AUTO: NEGATIVE
PH UR: 7 [PH] (ref 5–8)
PROT UR-MCNC: NEGATIVE MG/DL
SP GR UR STRIP: 1.02 (ref 1–1.03)
URATE SERPL-MCNC: 5 MG/DL (ref 2.6–6)
UROBILINOGEN UR STRIP-ACNC: <2

## 2020-02-14 PROCEDURE — 81003 URINALYSIS AUTO W/O SCOPE: CPT

## 2020-02-14 PROCEDURE — 84550 ASSAY OF BLOOD/URIC ACID: CPT

## 2020-02-14 PROCEDURE — 36415 COLL VENOUS BLD VENIPUNCTURE: CPT

## 2020-02-14 PROCEDURE — 74021 RADEX ABDOMEN 3+ VIEWS: CPT | Performed by: UROLOGY

## 2020-02-17 ENCOUNTER — TELEPHONE (OUTPATIENT)
Dept: INTERNAL MEDICINE CLINIC | Facility: CLINIC | Age: 60
End: 2020-02-17

## 2020-02-17 RX ORDER — ATENOLOL 25 MG/1
TABLET ORAL
Qty: 180 TABLET | Refills: 0 | Status: SHIPPED | OUTPATIENT
Start: 2020-02-17 | End: 2020-03-16

## 2020-02-17 NOTE — TELEPHONE ENCOUNTER
Current Outpatient Medications:     •  ATENOLOL 25 MG Oral Tab, TAKE 1 TABLET BY MOUTH EVERY 12 HOURS, Disp: 180 tablet, Rfl: 1 REFILL

## 2020-02-17 NOTE — TELEPHONE ENCOUNTER
Pt stated pharmacy stated  do not have a refill, Pt accidentally dropped rx , medlist  reveals 180/1 sent 12/31/19 , rx sent

## 2020-02-18 ENCOUNTER — PATIENT MESSAGE (OUTPATIENT)
Dept: SURGERY | Facility: CLINIC | Age: 60
End: 2020-02-18

## 2020-02-19 ENCOUNTER — TELEPHONE (OUTPATIENT)
Dept: SURGERY | Facility: CLINIC | Age: 60
End: 2020-02-19

## 2020-02-19 DIAGNOSIS — I87.8 PHLEBOLITH: ICD-10-CM

## 2020-02-19 DIAGNOSIS — N20.0 KIDNEY STONES: Primary | ICD-10-CM

## 2020-02-19 DIAGNOSIS — R93.7 ABNORMAL X-RAY OF PELVIS: ICD-10-CM

## 2020-02-19 DIAGNOSIS — N21.0 BLADDER STONE: ICD-10-CM

## 2020-02-19 NOTE — TELEPHONE ENCOUNTER
I sent patient the following message today by means of \"my chart––result note\" ---    Brodiesneha Romero,  2/14/2020 KUB plain x-ray confirms that right kidney has kidney stone; the plain x-ray reports 2 stones with 1 stone being 8 x 3 mm and the other stone very sma

## 2020-02-19 NOTE — TELEPHONE ENCOUNTER
LACYB result notes from Dr. Cayden Delaney by Dara Dominguez on 2/19/2020 9:01 AM.\"  No further actions needed.

## 2020-03-06 ENCOUNTER — HOSPITAL ENCOUNTER (OUTPATIENT)
Dept: ULTRASOUND IMAGING | Facility: HOSPITAL | Age: 60
Discharge: HOME | End: 2020-03-06
Attending: UROLOGY
Payer: COMMERCIAL

## 2020-03-06 DIAGNOSIS — R93.7 ABNORMAL X-RAY OF PELVIS: ICD-10-CM

## 2020-03-06 DIAGNOSIS — I87.8 PHLEBOLITH: ICD-10-CM

## 2020-03-06 DIAGNOSIS — N21.0 BLADDER STONE: ICD-10-CM

## 2020-03-06 PROCEDURE — 76857 US EXAM PELVIC LIMITED: CPT | Performed by: UROLOGY

## 2020-03-16 ENCOUNTER — TELEPHONE (OUTPATIENT)
Dept: INTERNAL MEDICINE CLINIC | Facility: CLINIC | Age: 60
End: 2020-03-16

## 2020-03-16 RX ORDER — ATENOLOL 25 MG/1
TABLET ORAL
Qty: 180 TABLET | Refills: 0 | Status: SHIPPED | OUTPATIENT
Start: 2020-03-16 | End: 2020-04-17

## 2020-03-16 NOTE — TELEPHONE ENCOUNTER
Current Outpatient Medications:   •  atenolol 25 MG Oral Tab, TAKE 1 TABLET BY MOUTH EVERY 12 HOURS, Disp: 180 tablet, Rfl: 0 REFILL     •  triamcinolone acetonide 0.1 % External Cream, triamcinolone acetonide 0.1 % topical cream, Disp: , Rfl:  REFILL

## 2020-03-25 NOTE — PROGRESS NOTES
Breast Surgery New Patient Consultation    This is the first visit for this 61year old woman, referred by Dr. Vivien Chin, who presents for evaluation of abnormal imaging.     History of Present Illness:   Ms. Kanika Soto is a 61year old woman who presents Triamterene-HCTZ 37.5-25 MG Oral Tab, 1/2 tab qam (Patient taking differently: 1/4 tab qam ), Disp: 90 tablet, Rfl: 1  [DISCONTINUED] PRAVASTATIN SODIUM 20 MG Oral Tab, TAKE 1 TABLET(20 MG) BY MOUTH EVERY NIGHT, Disp: 90 tablet, Rfl: 1  lansoprazole 30 M lying flat, SOB/Coughing at night, swelling of the legs or chest pain while walking.     Breasts:  See history of present illness    Gastrointestinal:     There is no history of difficulty or pain with swallowing, +reflux symptoms, vomiting, dark or bloody speech patterns and movements are normal. Her affect is appropriate. HEENT: The head is normocephalic. The neck is supple. The thyroid is not enlarged and is without palpable masses/nodules. There are no palpable masses. The trachea is in the midline.  C did appear to be concordant with the finding of benign fibroadenomatosis and for this no further treatment is needed.   She does appear to have a cluster of cysts in the left breast at 1:00 which are unusual for a postmenopausal woman, and therefore I have

## 2020-04-16 PROBLEM — Z00.00 ADULT GENERAL MEDICAL EXAM: Status: ACTIVE | Noted: 2020-04-16

## 2020-04-16 PROBLEM — Z12.11 COLON CANCER SCREENING: Status: ACTIVE | Noted: 2020-04-16

## 2020-04-16 PROBLEM — Z12.4 PAP SMEAR FOR CERVICAL CANCER SCREENING: Status: ACTIVE | Noted: 2020-04-16

## 2020-04-17 ENCOUNTER — VIRTUAL PHONE E/M (OUTPATIENT)
Dept: INTERNAL MEDICINE CLINIC | Facility: CLINIC | Age: 60
End: 2020-04-17
Payer: COMMERCIAL

## 2020-04-17 DIAGNOSIS — Z12.4 PAP SMEAR FOR CERVICAL CANCER SCREENING: ICD-10-CM

## 2020-04-17 DIAGNOSIS — E78.00 HYPERCHOLESTEROLEMIA: ICD-10-CM

## 2020-04-17 DIAGNOSIS — Z00.00 ADULT GENERAL MEDICAL EXAM: ICD-10-CM

## 2020-04-17 DIAGNOSIS — I10 HYPERTENSION, BENIGN: Primary | ICD-10-CM

## 2020-04-17 DIAGNOSIS — E53.8 VITAMIN B 12 DEFICIENCY: ICD-10-CM

## 2020-04-17 DIAGNOSIS — Z12.11 COLON CANCER SCREENING: ICD-10-CM

## 2020-04-17 DIAGNOSIS — R92.8 ABNORMAL MAMMOGRAM: ICD-10-CM

## 2020-04-17 DIAGNOSIS — R73.9 HYPERGLYCEMIA: ICD-10-CM

## 2020-04-17 DIAGNOSIS — Z12.39 BREAST CANCER SCREENING: ICD-10-CM

## 2020-04-17 PROCEDURE — 99213 OFFICE O/P EST LOW 20 MIN: CPT | Performed by: INTERNAL MEDICINE

## 2020-04-17 RX ORDER — ATENOLOL 25 MG/1
TABLET ORAL
Qty: 180 TABLET | Refills: 0 | Status: SHIPPED | OUTPATIENT
Start: 2020-04-17 | End: 2020-10-16

## 2020-04-17 RX ORDER — PRAVASTATIN SODIUM 20 MG
20 TABLET ORAL NIGHTLY
Qty: 90 TABLET | Refills: 1 | Status: SHIPPED | OUTPATIENT
Start: 2020-04-17 | End: 2020-10-16

## 2020-04-17 RX ORDER — TRIAMTERENE AND HYDROCHLOROTHIAZIDE 37.5; 25 MG/1; MG/1
TABLET ORAL
Qty: 90 TABLET | Refills: 1 | Status: SHIPPED | OUTPATIENT
Start: 2020-04-17 | End: 2020-12-29

## 2020-04-17 NOTE — PROGRESS NOTES
Virtual Telephone Check-In    Jemma Cheema verbally consents to a Virtual/Telephone Check-In visit on 04/17/20. Patient understands and accepts financial responsibility for any deductible, co-insurance and/or co-pays associated with this service.     D 10/18/2019 10:58 AM     10/18/2019 10:58 AM    K 3.5 10/18/2019 10:58 AM     10/18/2019 10:58 AM    CO2 30.0 10/18/2019 10:58 AM    CREATSERUM 0.78 10/18/2019 10:58 AM    CA 9.3 10/18/2019 10:58 AM    AST 30 10/18/2019 10:58 AM    ALT 50 10/18/ topically 3 (three) times daily. triamcinolone acetonide 0.1 % topical cream  -     atenolol 25 MG Oral Tab; TAKE 1 TABLET BY MOUTH EVERY 12 HOURS  -     Pravastatin Sodium 20 MG Oral Tab; Take 1 tablet (20 mg total) by mouth nightly.   -     Triamterene-HC

## 2020-06-08 RX ORDER — LANSOPRAZOLE 30 MG/1
CAPSULE, DELAYED RELEASE ORAL
Qty: 90 CAPSULE | Refills: 0 | Status: SHIPPED | OUTPATIENT
Start: 2020-06-08 | End: 2021-12-24

## 2020-08-07 ENCOUNTER — HOSPITAL ENCOUNTER (OUTPATIENT)
Dept: ULTRASOUND IMAGING | Facility: HOSPITAL | Age: 60
Discharge: HOME | End: 2020-08-07
Attending: INTERNAL MEDICINE
Payer: COMMERCIAL

## 2020-08-07 ENCOUNTER — HOSPITAL ENCOUNTER (OUTPATIENT)
Dept: MAMMOGRAPHY | Facility: HOSPITAL | Age: 60
Discharge: HOME | End: 2020-08-07
Attending: INTERNAL MEDICINE
Payer: COMMERCIAL

## 2020-08-07 DIAGNOSIS — R92.8 ABNORMAL MAMMOGRAM: ICD-10-CM

## 2020-08-07 PROCEDURE — 76642 ULTRASOUND BREAST LIMITED: CPT | Performed by: INTERNAL MEDICINE

## 2020-08-07 PROCEDURE — 77066 DX MAMMO INCL CAD BI: CPT | Performed by: INTERNAL MEDICINE

## 2020-08-07 PROCEDURE — 77062 BREAST TOMOSYNTHESIS BI: CPT | Performed by: INTERNAL MEDICINE

## 2020-10-16 RX ORDER — ATENOLOL 25 MG/1
TABLET ORAL
Qty: 180 TABLET | Refills: 1 | Status: SHIPPED | OUTPATIENT
Start: 2020-10-16 | End: 2020-12-29

## 2020-10-16 RX ORDER — PRAVASTATIN SODIUM 20 MG
TABLET ORAL
Qty: 90 TABLET | Refills: 1 | Status: SHIPPED | OUTPATIENT
Start: 2020-10-16 | End: 2020-12-29

## 2020-11-04 ENCOUNTER — PATIENT MESSAGE (OUTPATIENT)
Dept: INTERNAL MEDICINE CLINIC | Facility: CLINIC | Age: 60
End: 2020-11-04

## 2020-11-05 NOTE — TELEPHONE ENCOUNTER
I have not seen this patient. Recommend patient be seen in the office. Patient should wear mask and goggles for OV. Patient exam rooms are clean after each patient.

## 2020-11-05 NOTE — TELEPHONE ENCOUNTER
From: Dara Dominguez  To: Daisy Coronel. Juvencio Krishnamurthy MD  Sent: 11/4/2020 6:42 PM CST  Subject: Non-Urgent Medical Question    Hi Dr. Lida Medrano Silver been having pain in my left wrist over the past month.   I thought it might be Carpal tunnel related but I also fell on

## 2020-11-06 ENCOUNTER — NURSE TRIAGE (OUTPATIENT)
Dept: INTERNAL MEDICINE CLINIC | Facility: CLINIC | Age: 60
End: 2020-11-06

## 2020-11-06 ENCOUNTER — TELEPHONE (OUTPATIENT)
Dept: INTERNAL MEDICINE CLINIC | Facility: CLINIC | Age: 60
End: 2020-11-06

## 2020-11-06 ENCOUNTER — PATIENT MESSAGE (OUTPATIENT)
Dept: INTERNAL MEDICINE CLINIC | Facility: CLINIC | Age: 60
End: 2020-11-06

## 2020-11-06 DIAGNOSIS — M25.532 LEFT WRIST PAIN: Primary | ICD-10-CM

## 2020-11-06 NOTE — TELEPHONE ENCOUNTER
Received call from [facility], pt wanting to know what dr had said in regards to XR.    Pt was really upset and yelling at me for not being able to just place an order for Xray for her wrist, doctor just wants for her to suffer in pain because she doesn't w

## 2020-11-06 NOTE — TELEPHONE ENCOUNTER
From: Rosemary Bowden  To: Cheyanne Cross. Wil Valdovinos MD  Sent: 11/6/2020 8:49 AM CST  Subject: Visit Follow-up Question    I attempted to schedule an appointment today.  I cannot get there until 4:30. ’s hours are until 4pm.

## 2020-11-06 NOTE — TELEPHONE ENCOUNTER
Without seeing her to make an evaluation exactly what type of x-rays there is different types. Do not want to give her excess radiation exposure. Better for her to come in.   Do 11 AM on November 9, 2020 at Monday at [facility] office just a quick squeezing little

## 2020-11-06 NOTE — TELEPHONE ENCOUNTER
Advised patient of Dr. Kassi Marie note. Patient verbalized understanding  Patient states she is unable to come into office on Monday and can only come int the evening. Patient frustrated because she has been going back and forth with calls all day.    Sonia Coley

## 2020-11-06 NOTE — TELEPHONE ENCOUNTER
----- Message from Noreen Pace sent at 11/6/2020  9:06 AM CST -----  Regarding: Other  Contact: [phone]  I have pain in the radius/trapezium area of my wrist that resonates up my arm on the thumb side about 4 inches.  After I fell it was black and b

## 2020-11-06 NOTE — TELEPHONE ENCOUNTER
Patient called to make appointment. Dr Jenelle Looney did not have availability. [facility] offered Antonino Santacruz, her available appointments did not work for patient. [facility] offered triage, other Dr and video visit. Patient declined and disconnected call. GIANFRANCO

## 2020-11-06 NOTE — TELEPHONE ENCOUNTER
Patient unable to come in Monday does not have car can only come in after 6:00 pm.  Unable to speak to her regarding Dr Anuradha Feliciano response. Call transferred to [facility] triage .

## 2020-11-06 NOTE — TELEPHONE ENCOUNTER
To: Ulysses Clancy      From: Jose Carlos Cid RN      Created: 11/6/2020 8:17 AM        [facility], Please call [phone], press 1 then 1 to schedule an appointment with Dr. Emily Henning to review your wrist injury and so she can make a more accurate tasha

## 2020-11-06 NOTE — TELEPHONE ENCOUNTER
Action Requested: Summary for Provider     []  Critical Lab, Recommendations Needed  [x] Need Additional Advice  []   FYI    [x]   Need Orders  [] Need Medications Sent to Pharmacy  []  Other     SUMMARY:  Per yaa advised: OV today or tomorrow- Dr. J Luis Lara

## 2020-11-07 NOTE — TELEPHONE ENCOUNTER
I have nothing more to add.  Her pcp doesn't want to order xrays, I am not going to ok it   She has 2 choices , one to contact PCP Monday to discuss further , other is Lucy ROBERTSON

## 2020-11-08 NOTE — TELEPHONE ENCOUNTER
There are different x-rays based upon what we feel on exam.  And sometimes x-rays will not be needed due to radiation exposure. But it cannot be evaluated without seeing what needs to be done.   Can she set up a televisit virtual video on Doximity and we eddie

## 2020-11-09 ENCOUNTER — TELEMEDICINE (OUTPATIENT)
Dept: INTERNAL MEDICINE CLINIC | Facility: CLINIC | Age: 60
End: 2020-11-09
Payer: COMMERCIAL

## 2020-11-09 DIAGNOSIS — M25.532 LEFT WRIST PAIN: Primary | ICD-10-CM

## 2020-11-09 PROCEDURE — 99214 OFFICE O/P EST MOD 30 MIN: CPT | Performed by: INTERNAL MEDICINE

## 2020-11-12 NOTE — PROGRESS NOTES
Note     Virtual Doximity Video Check-In     Jono Fontaine verbally consents to a Virtual Video Doximityl Check-In visit on 11/09/20.     Patient understands and accepts financial responsibility for any deductible, co-insurance and/or co-pays associated w palm. Swelling. Bruise. Iced. Pain with movement sharp now. R handed. Denies new numbness (has cervical disc dis. And numbness from that).  +Weakness.      Review of Systems:     General: Denies fatigue, chills/fever, night sweats, weight loss, loss of appe medications, radiological test and decision making. Appropriate medical decision making and tests are ordered as detailed in the plan of care above.     Destini Yañez MD

## 2020-11-14 ENCOUNTER — HOSPITAL ENCOUNTER (OUTPATIENT)
Dept: GENERAL RADIOLOGY | Facility: HOSPITAL | Age: 60
Discharge: HOME | End: 2020-11-14
Attending: INTERNAL MEDICINE
Payer: COMMERCIAL

## 2020-11-14 DIAGNOSIS — M25.532 LEFT WRIST PAIN: ICD-10-CM

## 2020-11-14 PROCEDURE — 73110 X-RAY EXAM OF WRIST: CPT | Performed by: INTERNAL MEDICINE

## 2020-11-14 PROCEDURE — 73130 X-RAY EXAM OF HAND: CPT | Performed by: INTERNAL MEDICINE

## 2020-11-16 ENCOUNTER — PATIENT MESSAGE (OUTPATIENT)
Dept: INTERNAL MEDICINE CLINIC | Facility: CLINIC | Age: 60
End: 2020-11-16

## 2020-11-17 NOTE — TELEPHONE ENCOUNTER
From: Pearl Aguilera  To: Melanie Vanegas. Armando Crowder MD  Sent: 11/16/2020 1:55 PM CST  Subject: Other    Hi Dr. Armando Crowder-  I see there is no fracture per my test results. Glad about that! The erratic pain still persists. I continue to wear my brace.  Could it be a str

## 2020-12-28 ENCOUNTER — PATIENT MESSAGE (OUTPATIENT)
Dept: INTERNAL MEDICINE CLINIC | Facility: CLINIC | Age: 60
End: 2020-12-28

## 2020-12-28 RX ORDER — ATENOLOL 25 MG/1
TABLET ORAL
Qty: 180 TABLET | Refills: 1 | OUTPATIENT
Start: 2020-12-28

## 2020-12-28 NOTE — TELEPHONE ENCOUNTER
No future appointments. From: Pearl Aguilera  To: Melanie Vanegas. Armando Crowder MD  Sent: 12/28/2020  2:37 PM CST  Subject: Visit Follow-up Question    Hi Dr. Armando Crowder-  I just received a message that you won’t refill my prescriptions until I come to see you.  I

## 2020-12-28 NOTE — TELEPHONE ENCOUNTER
Patient called back to schedule appointments. No appointments available until 04/1.  Patient only wants to be seen by Dr Leola Long on a Tuesday evening after 5:30pm. Can patient be accommodated in to your schedule

## 2020-12-29 ENCOUNTER — TELEPHONE (OUTPATIENT)
Dept: INTERNAL MEDICINE CLINIC | Facility: CLINIC | Age: 60
End: 2020-12-29

## 2020-12-29 RX ORDER — PRAVASTATIN SODIUM 20 MG
20 TABLET ORAL NIGHTLY
Qty: 90 TABLET | Refills: 1 | Status: SHIPPED | OUTPATIENT
Start: 2020-12-29 | End: 2021-04-05

## 2020-12-29 RX ORDER — TRIAMTERENE AND HYDROCHLOROTHIAZIDE 37.5; 25 MG/1; MG/1
TABLET ORAL
Qty: 90 TABLET | Refills: 1 | Status: SHIPPED | OUTPATIENT
Start: 2020-12-29 | End: 2021-10-19

## 2020-12-29 RX ORDER — ATENOLOL 25 MG/1
TABLET ORAL
Qty: 180 TABLET | Refills: 0 | Status: SHIPPED | OUTPATIENT
Start: 2020-12-29 | End: 2021-04-05

## 2020-12-29 NOTE — TELEPHONE ENCOUNTER
Left message for patient to call office ok to schedule in April for physical and Dr Mikey Abel will refill once appointment is made

## 2020-12-29 NOTE — TELEPHONE ENCOUNTER
See prior message. Okay to wait until April for a physical.  We will give her a refill in the meantime as long as she schedules an appointment in April.

## 2020-12-29 NOTE — TELEPHONE ENCOUNTER
I do need her to schedule an appointment. Once appointment scheduled I will refill the medications it is for a physical and a physical cannot be done as a FaceTime.

## 2020-12-29 NOTE — TELEPHONE ENCOUNTER
Current Outpatient Medications:     Patient has appointment scheduled April 13th     •  PRAVASTATIN SODIUM 20 MG Oral Tab, TAKE 1 TABLET(20 MG) BY MOUTH EVERY NIGHT, Disp: 90 tablet, Rfl: 1      •  Triamterene-HCTZ 37.5-25 MG Oral Tab, 1/4 tab qam, Disp:

## 2020-12-29 NOTE — TELEPHONE ENCOUNTER
Patient only wants to see Dr Rosey Sharif because of her work schedule she can only come in Tuesdays after 5:30

## 2021-01-04 ENCOUNTER — PATIENT MESSAGE (OUTPATIENT)
Dept: INTERNAL MEDICINE CLINIC | Facility: CLINIC | Age: 61
End: 2021-01-04

## 2021-01-04 DIAGNOSIS — R92.8 ABNORMAL MAMMOGRAM OF LEFT BREAST: Primary | ICD-10-CM

## 2021-01-05 NOTE — TELEPHONE ENCOUNTER
From: Johnny Marin  To: Mónica Garcia. Светлана Salas MD  Sent: 1/4/2021 10:18 AM CST  Subject: Other    [facility] Dr. Светлана Salas. I’m due for my breast ultrasound in February. Will you please put the order in for me so that I can get that scheduled ?   Thank you,

## 2021-01-05 NOTE — TELEPHONE ENCOUNTER
Dr. Lisette Salas, please advise on order. CONCLUSION:       Stable probably benign cluster of cysts left 1 o'clock. Recommend six-month follow-up left diagnostic targeted ultrasound.      No mammographic evidence of malignancy right breast.     BI-RADS CATEG

## 2021-03-08 ENCOUNTER — HOSPITAL ENCOUNTER (OUTPATIENT)
Dept: ULTRASOUND IMAGING | Facility: HOSPITAL | Age: 61
Discharge: HOME | End: 2021-03-08
Attending: INTERNAL MEDICINE
Payer: COMMERCIAL

## 2021-03-08 DIAGNOSIS — R92.8 ABNORMAL MAMMOGRAM OF LEFT BREAST: ICD-10-CM

## 2021-03-08 PROCEDURE — 76642 ULTRASOUND BREAST LIMITED: CPT | Performed by: INTERNAL MEDICINE

## 2021-03-11 ENCOUNTER — MED REC SCAN ONLY (OUTPATIENT)
Dept: INTERNAL MEDICINE CLINIC | Facility: CLINIC | Age: 61
End: 2021-03-11

## 2021-03-11 PROBLEM — H35.342 MACULAR HOLE OF LEFT EYE: Status: ACTIVE | Noted: 2021-03-11

## 2021-03-11 PROBLEM — H25.13 NUCLEAR SCLEROSIS OF BOTH EYES: Status: ACTIVE | Noted: 2021-03-11

## 2021-04-05 RX ORDER — PRAVASTATIN SODIUM 20 MG
TABLET ORAL
Qty: 90 TABLET | Refills: 1 | Status: SHIPPED | OUTPATIENT
Start: 2021-04-05 | End: 2021-12-12

## 2021-04-05 RX ORDER — ATENOLOL 25 MG/1
TABLET ORAL
Qty: 180 TABLET | Refills: 0 | Status: SHIPPED | OUTPATIENT
Start: 2021-04-05 | End: 2021-07-06

## 2021-04-13 ENCOUNTER — OFFICE VISIT (OUTPATIENT)
Dept: INTERNAL MEDICINE CLINIC | Facility: CLINIC | Age: 61
End: 2021-04-13
Payer: COMMERCIAL

## 2021-04-13 VITALS
SYSTOLIC BLOOD PRESSURE: 138 MMHG | TEMPERATURE: 97 F | DIASTOLIC BLOOD PRESSURE: 83 MMHG | HEIGHT: 64 IN | OXYGEN SATURATION: 98 % | HEART RATE: 55 BPM | BODY MASS INDEX: 24.92 KG/M2 | WEIGHT: 146 LBS | RESPIRATION RATE: 18 BRPM

## 2021-04-13 DIAGNOSIS — M85.89 OSTEOPENIA OF MULTIPLE SITES: ICD-10-CM

## 2021-04-13 DIAGNOSIS — C44.90 SKIN CANCER: ICD-10-CM

## 2021-04-13 DIAGNOSIS — Z12.31 ENCOUNTER FOR SCREENING MAMMOGRAM FOR MALIGNANT NEOPLASM OF BREAST: ICD-10-CM

## 2021-04-13 DIAGNOSIS — Z71.85 VACCINE COUNSELING: ICD-10-CM

## 2021-04-13 DIAGNOSIS — E53.8 VITAMIN B 12 DEFICIENCY: ICD-10-CM

## 2021-04-13 DIAGNOSIS — Z12.11 COLON CANCER SCREENING: ICD-10-CM

## 2021-04-13 DIAGNOSIS — R93.89 ABNORMAL CT OF THE CHEST: ICD-10-CM

## 2021-04-13 DIAGNOSIS — I10 HYPERTENSION, BENIGN: Primary | ICD-10-CM

## 2021-04-13 DIAGNOSIS — Z12.4 PAP SMEAR FOR CERVICAL CANCER SCREENING: ICD-10-CM

## 2021-04-13 DIAGNOSIS — Z00.00 ADULT GENERAL MEDICAL EXAM: ICD-10-CM

## 2021-04-13 DIAGNOSIS — R73.9 HYPERGLYCEMIA: ICD-10-CM

## 2021-04-13 DIAGNOSIS — E78.00 HYPERCHOLESTEROLEMIA: ICD-10-CM

## 2021-04-13 PROBLEM — R21 RASH: Status: RESOLVED | Noted: 2019-10-15 | Resolved: 2021-04-13

## 2021-04-13 PROCEDURE — 99396 PREV VISIT EST AGE 40-64: CPT | Performed by: INTERNAL MEDICINE

## 2021-04-13 PROCEDURE — 3079F DIAST BP 80-89 MM HG: CPT | Performed by: INTERNAL MEDICINE

## 2021-04-13 PROCEDURE — 3008F BODY MASS INDEX DOCD: CPT | Performed by: INTERNAL MEDICINE

## 2021-04-13 PROCEDURE — 3075F SYST BP GE 130 - 139MM HG: CPT | Performed by: INTERNAL MEDICINE

## 2021-04-13 NOTE — PROGRESS NOTES
HPI:    Patient ID: Rosemary Bowden is a 64year old female. Rosmeary Bowden is a 64year old female who presents for a complete physical exam.   HPI:   Patient presents with:  Physical: annual exam   Scheduled vitrectomy on next month.      No LMP recorde TAKE 1 TABLET BY MOUTH EVERY 12 HOURS 180 tablet 0   • PRAVASTATIN SODIUM 20 MG Oral Tab TAKE 1 TABLET(20 MG) BY MOUTH EVERY NIGHT 90 tablet 1   • Triamterene-HCTZ 37.5-25 MG Oral Tab 1/4 tab qam 90 tablet 1   • LANSOPRAZOLE 30 MG Oral Capsule Delayed Rele Readings from Last 3 Encounters:  04/13/21 : 138/83  02/07/20 : 128/78  12/20/19 : 119/71    Labs:   Lab Results   Component Value Date/Time    GLU 77 10/18/2019 10:58 AM     10/18/2019 10:58 AM    K 3.5 10/18/2019 10:58 AM     10/18/2019 10:58 seizures, syncope, facial asymmetry, speech difficulty, weakness, light-headedness, numbness and headaches.    Psychiatric/Behavioral: Negative for agitation, behavioral problems, confusion, decreased concentration, dysphoric mood, hallucinations, self-inju Location: Left arm, Patient Position: Sitting, Cuff Size: adult)   Pulse 55   Temp 97.2 °F (36.2 °C) (Other)   Resp 18   Ht 5' 4\" (1.626 m)   Wt 146 lb (66.2 kg)   SpO2 98%   BMI 25.06 kg/m²   BP Readings from Last 3 Encounters:  04/13/21 : 138/83  02/07/ Conjunctiva/sclera: Conjunctivae normal.      Right eye: Right conjunctiva is not injected. No chemosis, exudate or hemorrhage. Left eye: Left conjunctiva is not injected. No chemosis, exudate or hemorrhage.      Pupils: Pupils are equal, round, and arlene cervical adenopathy. Left cervical: No superficial, deep or posterior cervical adenopathy. Upper Body:      Right upper body: No supraclavicular adenopathy. Left upper body: No supraclavicular adenopathy.    Skin:     General: Skin is warm and Cream 120 g 3     Sig: Apply topically 3 (three) times daily. triamcinolone acetonide 0.1 % topical cream       Imaging & Referrals:  ZOSTER VACC RECOMBINANT IM NJX     RTC 6 months for F/U.

## 2021-04-13 NOTE — PATIENT INSTRUCTIONS
ASSESSMENT/PLAN:   Hypertension, benign  (primary encounter diagnosis) Good control. Careful with diet and excercise at least 30 minutes 3-4 times a week. Check blood pressures at different times on different days. Can purchase own blood pressure monitor. later, the virus can become active again and travel along the nerve to the skin. Most people have shingles only once. But it's possible to have it more than once. Who is at risk for shingles? Anyone who has ever had chickenpox can get shingles.  But your packs or cool compresses, or soak in a cool bath. To make an ice pack, put ice cubes in a plastic bag that seals at the top. Wrap the bag in a clean, thin towel or cloth. Never put ice or an ice pack directly on the skin.   · Use calamine lotion to calm itc chickenpox can get the virus from you. But instead of have shingles, the person may get chickenpox.  Until your blisters form scabs, don't have any contact with others, especially the following:  · Pregnant women who have never had chickenpox or the vaccine Zoster Vaccine, Recombinant injection  Brand Name: [facility]  What is this medicine? ZOSTER VACCINE (ZOS ter vak SEEN) is used to prevent shingles in adults 48years old and over. This vaccine is not used to treat shingles or nerve pain from shingles.   How trying to get pregnant  · breast-feeding  What should I watch for while using this medicine? Visit your doctor for regular check ups. This vaccine, like all vaccines, may not fully protect everyone. NOTE:This sheet is a summary.  It may not cover all pos

## 2021-04-19 ENCOUNTER — MED REC SCAN ONLY (OUTPATIENT)
Dept: INTERNAL MEDICINE CLINIC | Facility: CLINIC | Age: 61
End: 2021-04-19

## 2021-04-19 ENCOUNTER — LAB ENCOUNTER (OUTPATIENT)
Dept: LAB | Facility: HOSPITAL | Age: 61
End: 2021-04-19
Attending: INTERNAL MEDICINE
Payer: COMMERCIAL

## 2021-04-19 DIAGNOSIS — E53.8 VITAMIN B 12 DEFICIENCY: ICD-10-CM

## 2021-04-19 DIAGNOSIS — Z00.00 ADULT GENERAL MEDICAL EXAM: ICD-10-CM

## 2021-04-19 DIAGNOSIS — Z12.11 COLON CANCER SCREENING: ICD-10-CM

## 2021-04-19 DIAGNOSIS — M85.89 OSTEOPENIA OF MULTIPLE SITES: ICD-10-CM

## 2021-04-19 PROCEDURE — 82306 VITAMIN D 25 HYDROXY: CPT

## 2021-04-19 PROCEDURE — 83036 HEMOGLOBIN GLYCOSYLATED A1C: CPT

## 2021-04-19 PROCEDURE — 80061 LIPID PANEL: CPT

## 2021-04-19 PROCEDURE — 88175 CYTOPATH C/V AUTO FLUID REDO: CPT | Performed by: CLINICAL MEDICAL LABORATORY

## 2021-04-19 PROCEDURE — 85025 COMPLETE CBC W/AUTO DIFF WBC: CPT

## 2021-04-19 PROCEDURE — 87624 HPV HI-RISK TYP POOLED RSLT: CPT | Performed by: CLINICAL MEDICAL LABORATORY

## 2021-04-19 PROCEDURE — 84443 ASSAY THYROID STIM HORMONE: CPT

## 2021-04-19 PROCEDURE — 82607 VITAMIN B-12: CPT

## 2021-04-19 PROCEDURE — 36415 COLL VENOUS BLD VENIPUNCTURE: CPT

## 2021-04-19 PROCEDURE — 82274 ASSAY TEST FOR BLOOD FECAL: CPT

## 2021-04-19 PROCEDURE — 80053 COMPREHEN METABOLIC PANEL: CPT

## 2021-04-20 ENCOUNTER — LAB REQUISITION (OUTPATIENT)
Dept: LAB | Age: 61
End: 2021-04-20

## 2021-04-20 DIAGNOSIS — B97.7 PAPILLOMAVIRUS AS THE CAUSE OF DISEASES CLASSIFIED ELSEWHERE: ICD-10-CM

## 2021-04-20 DIAGNOSIS — N90.4 LEUKOPLAKIA OF VULVA: ICD-10-CM

## 2021-04-20 DIAGNOSIS — N87.0 MILD CERVICAL DYSPLASIA: ICD-10-CM

## 2021-04-20 DIAGNOSIS — Z12.4 ENCOUNTER FOR SCREENING FOR MALIGNANT NEOPLASM OF CERVIX: ICD-10-CM

## 2021-04-27 LAB
CASE RPRT: NORMAL
CYTOLOGY CVX/VAG STUDY: NORMAL
HPV16+18+45 E6+E7MRNA CVX NAA+PROBE: NEGATIVE
Lab: NORMAL
PAP EDUCATIONAL NOTE: NORMAL
SPECIMEN ADEQUACY: NORMAL

## 2021-07-06 RX ORDER — ACYCLOVIR 200 MG/1
CAPSULE ORAL
Qty: 90 CAPSULE | Refills: 0 | Status: SHIPPED | OUTPATIENT
Start: 2021-07-06 | End: 2022-04-15

## 2021-07-06 RX ORDER — ATENOLOL 25 MG/1
TABLET ORAL
Qty: 180 TABLET | Refills: 0 | Status: SHIPPED | OUTPATIENT
Start: 2021-07-06 | End: 2021-10-06

## 2021-09-18 ENCOUNTER — HOSPITAL ENCOUNTER (OUTPATIENT)
Dept: MAMMOGRAPHY | Facility: HOSPITAL | Age: 61
Discharge: HOME | End: 2021-09-18
Attending: INTERNAL MEDICINE
Payer: COMMERCIAL

## 2021-09-18 DIAGNOSIS — Z12.31 BREAST CANCER SCREENING BY MAMMOGRAM: ICD-10-CM

## 2021-09-18 PROCEDURE — 77063 BREAST TOMOSYNTHESIS BI: CPT | Performed by: INTERNAL MEDICINE

## 2021-09-18 PROCEDURE — 77067 SCR MAMMO BI INCL CAD: CPT | Performed by: INTERNAL MEDICINE

## 2021-10-06 RX ORDER — ATENOLOL 25 MG/1
TABLET ORAL
Qty: 180 TABLET | Refills: 1 | Status: SHIPPED | OUTPATIENT
Start: 2021-10-06 | End: 2022-02-25

## 2021-10-06 NOTE — TELEPHONE ENCOUNTER
Refill passed per [employer] protocol.       Requested Prescriptions   Pending Prescriptions Disp Refills    ATENOLOL 25 MG Oral Tab [Pharmacy Med Name: ATENOLOL 25MG TABLETS] 180 tablet 0     Sig: TAKE 1 TABLET BY MOUTH EVERY 12 HOURS        Hypertensive Medications Protocol Passed - 10/6/2021  6:54 AM        Passed - CMP or BMP in past 12 months        Passed - Appointment in past 6 or next 3 months        Passed - GFR Non- > 50     Lab Results   Component Value Date    GFRNAA 76 04/19/2021                        Future Appointments         Provider Department Appt Notes    In 1 week Kassandra Fowler MD [facility], [address] [facility] 6 mth follow up            Recent Outpatient Visits              5 months ago Hypertension, benign    [facility], [address], Delta Watson MD    Office Visit    11 months ago Left wrist pain    [address], Delta Watson MD    Telemedicine    1 year ago Hypertension, benign    Tom Harden MD    [employer] [facility]    1 year ago Kidney stone on right side    [facility] for Wood Crawford MD    Office Visit    1 year ago Breast cyst, left    [facility] Angie Berumen MD    Office Visit

## 2021-10-12 NOTE — TELEPHONE ENCOUNTER
Patient notified by phone Hep C added to lab order, A1C may not be covered. Said she will check with her insurance and if they do cover A1c will have it done next time. EUS/FNA

## 2021-10-19 ENCOUNTER — OFFICE VISIT (OUTPATIENT)
Dept: INTERNAL MEDICINE CLINIC | Facility: CLINIC | Age: 61
End: 2021-10-19
Payer: COMMERCIAL

## 2021-10-19 VITALS
DIASTOLIC BLOOD PRESSURE: 80 MMHG | SYSTOLIC BLOOD PRESSURE: 125 MMHG | TEMPERATURE: 97 F | OXYGEN SATURATION: 98 % | WEIGHT: 146.38 LBS | HEART RATE: 50 BPM | BODY MASS INDEX: 24.99 KG/M2 | RESPIRATION RATE: 17 BRPM | HEIGHT: 64 IN

## 2021-10-19 DIAGNOSIS — E53.8 VITAMIN B 12 DEFICIENCY: Primary | ICD-10-CM

## 2021-10-19 DIAGNOSIS — Z12.11 COLON CANCER SCREENING: ICD-10-CM

## 2021-10-19 DIAGNOSIS — E87.6 HYPOKALEMIA: ICD-10-CM

## 2021-10-19 DIAGNOSIS — Z71.85 VACCINE COUNSELING: ICD-10-CM

## 2021-10-19 DIAGNOSIS — E78.00 HYPERCHOLESTEROLEMIA: ICD-10-CM

## 2021-10-19 DIAGNOSIS — N87.9 CERVICAL DYSPLASIA: ICD-10-CM

## 2021-10-19 DIAGNOSIS — I10 HYPERTENSION, BENIGN: ICD-10-CM

## 2021-10-19 DIAGNOSIS — R73.9 HYPERGLYCEMIA: ICD-10-CM

## 2021-10-19 PROCEDURE — 3079F DIAST BP 80-89 MM HG: CPT | Performed by: INTERNAL MEDICINE

## 2021-10-19 PROCEDURE — 3074F SYST BP LT 130 MM HG: CPT | Performed by: INTERNAL MEDICINE

## 2021-10-19 PROCEDURE — 99213 OFFICE O/P EST LOW 20 MIN: CPT | Performed by: INTERNAL MEDICINE

## 2021-10-19 PROCEDURE — 3008F BODY MASS INDEX DOCD: CPT | Performed by: INTERNAL MEDICINE

## 2021-10-19 RX ORDER — CLOBETASOL PROPIONATE 0.5 MG/G
OINTMENT TOPICAL
Qty: 60 G | Refills: 2 | Status: SHIPPED | OUTPATIENT
Start: 2021-10-19 | End: 2022-01-08

## 2021-10-19 RX ORDER — TRIAMTERENE AND HYDROCHLOROTHIAZIDE 37.5; 25 MG/1; MG/1
TABLET ORAL
Qty: 90 TABLET | Refills: 1 | Status: SHIPPED | OUTPATIENT
Start: 2021-10-19

## 2021-10-19 NOTE — PROGRESS NOTES
HPI:    Patient ID: Jazmin Hernandez is a 64year old female. Here for FU. Feels good. No co.   Exercise level: very active and has been following low salt diet. Weight has been stable.     Wt Readings from Last 3 Encounters:  10/19/21 : 146 lb 6.4 oz (66.4 External Ointment RHYS EXT AA BID FOR 2 WKS 60 g 2   • atenolol 25 MG Oral Tab TAKE 1 TABLET BY MOUTH EVERY 12 HOURS 180 tablet 1   • acyclovir 200 MG Oral Cap TAKE 1 CAPSULE BY MOUTH THREE TIMES DAILY 90 capsule 0   • triamcinolone acetonide 0.1 % External reviewed. Constitutional:       General: She is not in acute distress. Appearance: Normal appearance. She is well-developed. She is not ill-appearing, toxic-appearing or diaphoretic. Interventions: She is not intubated.   Neck:      Thyroid: No t today.    Hypertension, benign Good control. Careful with diet and excercise at least 30 minutes 3-4 times a week. Check blood pressures at different times on different days. Can purchase own blood pressure monitor. If not, check at local pharmacy.  Jaymie benson

## 2021-10-20 NOTE — PATIENT INSTRUCTIONS
ASSESSMENT/PLAN:   Vitamin b 12 deficiency  (primary encounter diagnosis) Stable level 4-19-21. Vaccine counseling given information on Shingrix. Check on insurance coverage. Flu vaccine refused today. Pneumonia vaccine also refused today.     H that weaken your immune system  What are the symptoms of shingles? 1. The first sign of shingles is often pain, burning, tingling, or itching on one part of your face or body. You may also feel as if you have the flu, with fever and chills.   2. A red rash shingles? Shingles often goes away with no lasting effects. But some people have complications during or after the infection comes out:  · Postherpetic neuralgia. This is the most common complication.  It's more likely as people age, especially after age 10 transplants, or people with HIV infections), particularly if they have never had chicken pox.   The shingles vaccine  Two shingles vaccines are available to help prevent shingles or make it less painful:  · Zoster vaccine live (ZVL)  · Recombinant zoster va children. This medicine is not approved for use in children. What side effects may I notice from receiving this medicine?   Side effects that you should report to your doctor or health care professional as soon as possible:  · allergic reactions like skin

## 2021-12-12 RX ORDER — PRAVASTATIN SODIUM 20 MG
20 TABLET ORAL NIGHTLY
Qty: 90 TABLET | Refills: 1 | Status: SHIPPED | OUTPATIENT
Start: 2021-12-12 | End: 2022-06-12

## 2021-12-23 ENCOUNTER — PATIENT MESSAGE (OUTPATIENT)
Dept: INTERNAL MEDICINE CLINIC | Facility: CLINIC | Age: 61
End: 2021-12-23

## 2021-12-24 RX ORDER — LANSOPRAZOLE 30 MG/1
30 CAPSULE, DELAYED RELEASE ORAL
Qty: 90 CAPSULE | Refills: 1 | Status: SHIPPED | OUTPATIENT
Start: 2021-12-24

## 2021-12-24 NOTE — TELEPHONE ENCOUNTER
From: Terra Carrillo  To: Castillo Coon MD  Sent: 12/23/2021 3:18 PM CST  Subject: Refill Lansoprazole 30mg    Hi Dr. Kady Coon-  Please call in a refill for Lansoprazole 30mg for me to [place] [phone]. Happy Holidays!   Thank you,  Shirly Rinne

## 2022-01-29 ENCOUNTER — PATIENT MESSAGE (OUTPATIENT)
Dept: INTERNAL MEDICINE CLINIC | Facility: CLINIC | Age: 62
End: 2022-01-29

## 2022-01-29 DIAGNOSIS — M85.89 OSTEOPENIA OF MULTIPLE SITES: Primary | ICD-10-CM

## 2022-01-29 NOTE — TELEPHONE ENCOUNTER
Dr. Alex Renee, please see patients MyChart message below. [facility] Vit d order pended for you to review and sign off if appropriate. Thank you.

## 2022-01-29 NOTE — TELEPHONE ENCOUNTER
From: Roger Fulton  To: Kayley Romo. Maricel Douglas MD  Sent: 1/29/2022 6:19 AM CST  Subject: Vitamin D Level    Hi Dr. Maricel Douglas-  I would like to have my vitamin D level checked. With Covid and being inside all of the time I’m concerned that my level is low.  I’ve h

## 2022-02-19 LAB
CHOL/HDLC RATIO: 3.3 (CALC)
CHOLESTEROL, TOTAL: 230 MG/DL
HDL CHOLESTEROL: 70 MG/DL
HEMOGLOBIN A1C: 5.2 % OF TOTAL HGB
LDL-CHOLESTEROL: 134 MG/DL (CALC)
NON-HDL CHOLESTEROL: 160 MG/DL (CALC)
TRIGLYCERIDES: 131 MG/DL
VITAMIN D, 25-OH, TOTAL: 35 NG/ML (ref 30–100)

## 2022-02-25 RX ORDER — ATENOLOL 25 MG/1
TABLET ORAL
Qty: 180 TABLET | Refills: 1 | Status: SHIPPED | OUTPATIENT
Start: 2022-02-25

## 2022-02-25 NOTE — TELEPHONE ENCOUNTER
Refill passed per [facility] protocol.   Requested Prescriptions   Pending Prescriptions Disp Refills    ATENOLOL 25 MG Oral Tab [Pharmacy Med Name: ATENOLOL 25MG TABLETS] 180 tablet 1     Sig: TAKE 1 TABLET BY MOUTH EVERY 12 HOURS        Hypertensive Medications Protocol Passed - 2/25/2022  9:15 AM        Passed - CMP or BMP in past 12 months        Passed - Appointment in past 6 or next 3 months        Passed - GFR Non- > 50     Lab Results   Component Value Date    GFRNAA 76 04/19/2021                     Recent Outpatient Visits              4 months ago Vitamin B 12 deficiency    [employer] [place], [address], Lisa Clifford MD    Office Visit    10 months ago Hypertension, [facility], [address], Lisa Clifford MD    Office Visit    1 year ago Left wrist pain    [facility], Lisa Clifford MD    Telemedicine    1 year ago Hypertension, [facility] [employer] [place], [facility], [place], Lisa Florez MD    [employer] [facility]    2 years ago Kidney stone on right side    [facility], [address], Sandra Fritz MD    Office Visit          Future Appointments         Provider Department Appt Notes    In 1 month Boone Gardner MD [employer] [place], [facility], [address], [facility] physical    In 3 months Jack Nava MD [facility], [address] comp and pap - care partner and mask policy informed

## 2022-02-28 ENCOUNTER — OFFICE VISIT (OUTPATIENT)
Dept: INTERNAL MEDICINE CLINIC | Facility: CLINIC | Age: 62
End: 2022-02-28
Payer: COMMERCIAL

## 2022-02-28 ENCOUNTER — NURSE TRIAGE (OUTPATIENT)
Dept: INTERNAL MEDICINE CLINIC | Facility: CLINIC | Age: 62
End: 2022-02-28

## 2022-02-28 ENCOUNTER — HOSPITAL ENCOUNTER (OUTPATIENT)
Dept: CT IMAGING | Facility: HOSPITAL | Age: 62
Discharge: HOME | End: 2022-02-28
Attending: NURSE PRACTITIONER
Payer: COMMERCIAL

## 2022-02-28 VITALS
WEIGHT: 148.19 LBS | BODY MASS INDEX: 25.3 KG/M2 | DIASTOLIC BLOOD PRESSURE: 78 MMHG | SYSTOLIC BLOOD PRESSURE: 124 MMHG | HEIGHT: 64 IN

## 2022-02-28 DIAGNOSIS — M79.605 PAIN IN BOTH LOWER EXTREMITIES: ICD-10-CM

## 2022-02-28 DIAGNOSIS — M79.604 PAIN IN BOTH LOWER EXTREMITIES: ICD-10-CM

## 2022-02-28 DIAGNOSIS — M54.6 ACUTE BILATERAL THORACIC BACK PAIN: ICD-10-CM

## 2022-02-28 DIAGNOSIS — I10 HYPERTENSION, BENIGN: Primary | ICD-10-CM

## 2022-02-28 DIAGNOSIS — E78.00 HYPERCHOLESTEROLEMIA: ICD-10-CM

## 2022-02-28 LAB
APPEARANCE: CLEAR
BILIRUB UR QL: NEGATIVE
COLOR UR: YELLOW
GLUCOSE (URINE DIPSTICK): NEGATIVE MG/DL
GLUCOSE UR-MCNC: NEGATIVE MG/DL
HGB UR QL STRIP.AUTO: NEGATIVE
KETONES (URINE DIPSTICK): NEGATIVE MG/DL
KETONES UR-MCNC: NEGATIVE MG/DL
MULTISTIX LOT#: ABNORMAL NUMERIC
NITRITE UR QL STRIP.AUTO: NEGATIVE
NITRITE, URINE: NEGATIVE
PH UR: 5 [PH] (ref 5–8)
PH, URINE: 5.5 (ref 4.5–8)
PROT UR-MCNC: NEGATIVE MG/DL
PROTEIN (URINE DIPSTICK): NEGATIVE MG/DL
SP GR UR STRIP: 1.02 (ref 1–1.03)
SPECIFIC GRAVITY: 1.03 (ref 1–1.03)
URINE-COLOR: YELLOW
UROBILINOGEN UR STRIP-ACNC: <2
UROBILINOGEN,SEMI-QN: 0.2 MG/DL (ref 0–1.9)

## 2022-02-28 PROCEDURE — 3074F SYST BP LT 130 MM HG: CPT | Performed by: NURSE PRACTITIONER

## 2022-02-28 PROCEDURE — 81003 URINALYSIS AUTO W/O SCOPE: CPT | Performed by: NURSE PRACTITIONER

## 2022-02-28 PROCEDURE — 3008F BODY MASS INDEX DOCD: CPT | Performed by: NURSE PRACTITIONER

## 2022-02-28 PROCEDURE — 99214 OFFICE O/P EST MOD 30 MIN: CPT | Performed by: NURSE PRACTITIONER

## 2022-02-28 PROCEDURE — 74176 CT ABD & PELVIS W/O CONTRAST: CPT | Performed by: NURSE PRACTITIONER

## 2022-02-28 PROCEDURE — 3078F DIAST BP <80 MM HG: CPT | Performed by: NURSE PRACTITIONER

## 2022-03-01 ENCOUNTER — TELEPHONE (OUTPATIENT)
Dept: INTERNAL MEDICINE CLINIC | Facility: CLINIC | Age: 62
End: 2022-03-01

## 2022-03-01 NOTE — TELEPHONE ENCOUNTER
Pt requested rx to be canceled at [facility] and call Amira at [facility], rx canceled and advised pharmacy of rx resent , advised pharmacy need to reverse the claim inorder for pt to receive rx or can't p/u until the 5th  Pharmacy called back, stated it was done and pt will just pay 15 copay, pt advised, verbalized understanding

## 2022-03-01 NOTE — TELEPHONE ENCOUNTER
Toney Donohue from [facility] [id number], called requesting prior authorization information for CT SCAN and US VENOUS DOPPLER. Please contact patient insurance for RQI number and contact Toney Donohue when completed.

## 2022-03-08 ENCOUNTER — TELEPHONE (OUTPATIENT)
Dept: INTERNAL MEDICINE CLINIC | Facility: CLINIC | Age: 62
End: 2022-03-08

## 2022-03-08 ENCOUNTER — PATIENT MESSAGE (OUTPATIENT)
Dept: INTERNAL MEDICINE CLINIC | Facility: CLINIC | Age: 62
End: 2022-03-08

## 2022-03-08 DIAGNOSIS — Z12.11 COLON CANCER SCREENING: Primary | ICD-10-CM

## 2022-03-08 NOTE — TELEPHONE ENCOUNTER
Spoke to patient she is unable to come in this Thursday she is asking if you can squeeze her in next Tuesday March 15 after 5:00 pm

## 2022-03-10 NOTE — TELEPHONE ENCOUNTER
Can do 6 PM March 15, 2022 at [facility] office. Please be aware I am squeezing her in between patients I cannot address any other issues. This is actually not facial time I am just squeezing her in.

## 2022-03-13 LAB
ABSOLUTE BASOPHILS: 49 CELLS/UL (ref 0–200)
ABSOLUTE EOSINOPHILS: 201 CELLS/UL (ref 15–500)
ABSOLUTE LYMPHOCYTES: 2519 CELLS/UL (ref 850–3900)
ABSOLUTE MONOCYTES: 281 CELLS/UL (ref 200–950)
ABSOLUTE NEUTROPHILS: 3050 CELLS/UL (ref 1500–7800)
ALBUMIN/GLOBULIN RATIO: 1.7 (CALC) (ref 1–2.5)
ALBUMIN: 4.5 G/DL (ref 3.6–5.1)
ALKALINE PHOSPHATASE: 82 U/L (ref 37–153)
ALT: 36 U/L (ref 6–29)
AST: 30 U/L (ref 10–35)
BASOPHILS: 0.8 %
BILIRUBIN, TOTAL: 1.1 MG/DL (ref 0.2–1.2)
BUN: 17 MG/DL (ref 7–25)
CALCIUM: 9.9 MG/DL (ref 8.6–10.4)
CARBON DIOXIDE: 30 MMOL/L (ref 20–32)
CHLORIDE: 106 MMOL/L (ref 98–110)
CREATININE: 0.82 MG/DL (ref 0.5–0.99)
EGFR IF AFRICN AM: 89 ML/MIN/1.73M2
EGFR IF NONAFRICN AM: 77 ML/MIN/1.73M2
EOSINOPHILS: 3.3 %
GLOBULIN: 2.7 G/DL (CALC) (ref 1.9–3.7)
GLUCOSE: 86 MG/DL (ref 65–99)
HEMATOCRIT: 44.8 % (ref 35–45)
HEMOGLOBIN: 15.4 G/DL (ref 11.7–15.5)
LYMPHOCYTES: 41.3 %
MCH: 30.7 PG (ref 27–33)
MCHC: 34.4 G/DL (ref 32–36)
MCV: 89.2 FL (ref 80–100)
MONOCYTES: 4.6 %
MPV: 10.7 FL (ref 7.5–12.5)
NEUTROPHILS: 50 %
PLATELET COUNT: 280 THOUSAND/UL (ref 140–400)
POTASSIUM: 4 MMOL/L (ref 3.5–5.3)
PROTEIN, TOTAL: 7.2 G/DL (ref 6.1–8.1)
RDW: 12.4 % (ref 11–15)
RED BLOOD CELL COUNT: 5.02 MILLION/UL (ref 3.8–5.1)
SODIUM: 141 MMOL/L (ref 135–146)
WHITE BLOOD CELL COUNT: 6.1 THOUSAND/UL (ref 3.8–10.8)

## 2022-03-15 ENCOUNTER — OFFICE VISIT (OUTPATIENT)
Dept: INTERNAL MEDICINE CLINIC | Facility: CLINIC | Age: 62
End: 2022-03-15
Payer: COMMERCIAL

## 2022-03-15 VITALS
HEART RATE: 59 BPM | TEMPERATURE: 98 F | OXYGEN SATURATION: 98 % | SYSTOLIC BLOOD PRESSURE: 144 MMHG | WEIGHT: 149.38 LBS | HEIGHT: 64 IN | BODY MASS INDEX: 25.5 KG/M2 | RESPIRATION RATE: 16 BRPM | DIASTOLIC BLOOD PRESSURE: 78 MMHG

## 2022-03-15 DIAGNOSIS — E78.00 HYPERCHOLESTEROLEMIA: ICD-10-CM

## 2022-03-15 DIAGNOSIS — L75.0 URINARY BODY ODOR: ICD-10-CM

## 2022-03-15 DIAGNOSIS — M85.89 OSTEOPENIA OF MULTIPLE SITES: ICD-10-CM

## 2022-03-15 DIAGNOSIS — Z71.85 VACCINE COUNSELING: ICD-10-CM

## 2022-03-15 DIAGNOSIS — Z12.4 PAP SMEAR FOR CERVICAL CANCER SCREENING: ICD-10-CM

## 2022-03-15 DIAGNOSIS — I10 HYPERTENSION, BENIGN: Primary | ICD-10-CM

## 2022-03-15 DIAGNOSIS — R10.13 EPIGASTRIC PAIN: ICD-10-CM

## 2022-03-15 DIAGNOSIS — Z12.11 COLON CANCER SCREENING: ICD-10-CM

## 2022-03-15 PROCEDURE — 3008F BODY MASS INDEX DOCD: CPT | Performed by: INTERNAL MEDICINE

## 2022-03-15 PROCEDURE — 3077F SYST BP >= 140 MM HG: CPT | Performed by: INTERNAL MEDICINE

## 2022-03-15 PROCEDURE — 99215 OFFICE O/P EST HI 40 MIN: CPT | Performed by: INTERNAL MEDICINE

## 2022-03-15 PROCEDURE — 3078F DIAST BP <80 MM HG: CPT | Performed by: INTERNAL MEDICINE

## 2022-03-16 ENCOUNTER — TELEPHONE (OUTPATIENT)
Dept: INTERNAL MEDICINE CLINIC | Facility: CLINIC | Age: 62
End: 2022-03-16

## 2022-03-16 RX ORDER — LANSOPRAZOLE 30 MG/1
30 CAPSULE, DELAYED RELEASE ORAL
Qty: 90 CAPSULE | Refills: 1 | Status: SHIPPED | OUTPATIENT
Start: 2022-03-16 | End: 2022-03-28 | Stop reason: ALTCHOICE

## 2022-03-16 NOTE — TELEPHONE ENCOUNTER
1. Forgot to give her the number for the imaging place in [place] MRI of right light I believe the name is she is concerned about the cost was going to the hospital to get imaging done. The venous ultrasound.   2.

## 2022-03-16 NOTE — TELEPHONE ENCOUNTER
Spoke to [employer]) to request appeal for denied CT scan, Call transferred to [id number] [facility]. APRN for peer to peer.

## 2022-03-21 ENCOUNTER — PATIENT MESSAGE (OUTPATIENT)
Dept: INTERNAL MEDICINE CLINIC | Facility: CLINIC | Age: 62
End: 2022-03-21

## 2022-03-23 ENCOUNTER — PATIENT MESSAGE (OUTPATIENT)
Dept: INTERNAL MEDICINE CLINIC | Facility: CLINIC | Age: 62
End: 2022-03-23

## 2022-03-23 ENCOUNTER — TELEPHONE (OUTPATIENT)
Dept: INTERNAL MEDICINE CLINIC | Facility: CLINIC | Age: 62
End: 2022-03-23

## 2022-03-23 NOTE — TELEPHONE ENCOUNTER
Order for CT of chest has been mailed to Danyel [address] [place] [address], [facility] [address] as requested.

## 2022-03-24 ENCOUNTER — TELEPHONE (OUTPATIENT)
Dept: INTERNAL MEDICINE CLINIC | Facility: CLINIC | Age: 62
End: 2022-03-24

## 2022-03-24 ENCOUNTER — PATIENT MESSAGE (OUTPATIENT)
Dept: INTERNAL MEDICINE CLINIC | Facility: CLINIC | Age: 62
End: 2022-03-24

## 2022-03-24 ENCOUNTER — HOSPITAL ENCOUNTER (EMERGENCY)
Facility: HOSPITAL | Age: 62
Discharge: HOME | End: 2022-03-24
Attending: EMERGENCY MEDICINE
Payer: COMMERCIAL

## 2022-03-24 ENCOUNTER — APPOINTMENT (OUTPATIENT)
Dept: GENERAL RADIOLOGY | Facility: HOSPITAL | Age: 62
End: 2022-03-24
Attending: EMERGENCY MEDICINE
Payer: COMMERCIAL

## 2022-03-24 VITALS
HEIGHT: 60 IN | BODY MASS INDEX: 29.45 KG/M2 | DIASTOLIC BLOOD PRESSURE: 62 MMHG | SYSTOLIC BLOOD PRESSURE: 120 MMHG | TEMPERATURE: 97 F | HEART RATE: 54 BPM | OXYGEN SATURATION: 97 % | RESPIRATION RATE: 16 BRPM | WEIGHT: 150 LBS

## 2022-03-24 DIAGNOSIS — R00.2 PALPITATIONS: ICD-10-CM

## 2022-03-24 DIAGNOSIS — L30.9 ECZEMA, UNSPECIFIED TYPE: Primary | ICD-10-CM

## 2022-03-24 LAB
ANION GAP SERPL CALC-SCNC: 5 MMOL/L (ref 0–18)
BASOPHILS # BLD AUTO: 0.04 X10(3) UL (ref 0–0.2)
BASOPHILS NFR BLD AUTO: 0.5 %
BILIRUB UR QL: NEGATIVE
BUN BLD-MCNC: 12 MG/DL (ref 7–18)
BUN/CREAT SERPL: 14.5 (ref 10–20)
CALCIUM BLD-MCNC: 9.4 MG/DL (ref 8.5–10.1)
CHLORIDE SERPL-SCNC: 108 MMOL/L (ref 98–112)
CLARITY UR: CLEAR
CO2 SERPL-SCNC: 29 MMOL/L (ref 21–32)
COLOR UR: YELLOW
CREAT BLD-MCNC: 0.83 MG/DL
D DIMER PPP FEU-MCNC: <0.27 UG/ML FEU (ref ?–0.62)
DEPRECATED RDW RBC AUTO: 39.9 FL (ref 35.1–46.3)
EOSINOPHIL # BLD AUTO: 0.18 X10(3) UL (ref 0–0.7)
EOSINOPHIL NFR BLD AUTO: 2 %
ERYTHROCYTE [DISTWIDTH] IN BLOOD BY AUTOMATED COUNT: 12.1 % (ref 11–15)
GLUCOSE BLD-MCNC: 96 MG/DL (ref 70–99)
GLUCOSE UR-MCNC: NEGATIVE MG/DL
HCT VFR BLD AUTO: 45.4 %
HGB BLD-MCNC: 15.2 G/DL
HGB UR QL STRIP.AUTO: NEGATIVE
IMM GRANULOCYTES # BLD AUTO: 0.03 X10(3) UL (ref 0–1)
IMM GRANULOCYTES NFR BLD: 0.3 %
KETONES UR-MCNC: NEGATIVE MG/DL
LYMPHOCYTES # BLD AUTO: 2.04 X10(3) UL (ref 1–4)
LYMPHOCYTES NFR BLD AUTO: 23.2 %
MCH RBC QN AUTO: 30.3 PG (ref 26–34)
MCHC RBC AUTO-ENTMCNC: 33.5 G/DL (ref 31–37)
MCV RBC AUTO: 90.6 FL
MONOCYTES # BLD AUTO: 0.35 X10(3) UL (ref 0.1–1)
MONOCYTES NFR BLD AUTO: 4 %
NEUTROPHILS # BLD AUTO: 6.15 X10 (3) UL (ref 1.5–7.7)
NEUTROPHILS # BLD AUTO: 6.15 X10(3) UL (ref 1.5–7.7)
NEUTROPHILS NFR BLD AUTO: 70 %
NITRITE UR QL STRIP.AUTO: NEGATIVE
OSMOLALITY SERPL CALC.SUM OF ELEC: 294 MOSM/KG (ref 275–295)
PH UR: 7 [PH] (ref 5–8)
PLATELET # BLD AUTO: 310 10(3)UL (ref 150–450)
POTASSIUM SERPL-SCNC: 3.3 MMOL/L (ref 3.5–5.1)
PROT UR-MCNC: NEGATIVE MG/DL
RBC # BLD AUTO: 5.01 X10(6)UL
SODIUM SERPL-SCNC: 142 MMOL/L (ref 136–145)
SP GR UR STRIP: 1.01 (ref 1–1.03)
TROPONIN I HIGH SENSITIVITY: 3 NG/L
TSI SER-ACNC: 1.46 MIU/ML (ref 0.36–3.74)
UROBILINOGEN UR STRIP-ACNC: <2
VIT C UR-MCNC: NEGATIVE MG/DL
WBC # BLD AUTO: 8.8 X10(3) UL (ref 4–11)

## 2022-03-24 PROCEDURE — 71045 X-RAY EXAM CHEST 1 VIEW: CPT | Performed by: EMERGENCY MEDICINE

## 2022-03-24 PROCEDURE — 85379 FIBRIN DEGRADATION QUANT: CPT | Performed by: EMERGENCY MEDICINE

## 2022-03-24 PROCEDURE — 93005 ELECTROCARDIOGRAM TRACING: CPT

## 2022-03-24 PROCEDURE — 84484 ASSAY OF TROPONIN QUANT: CPT | Performed by: EMERGENCY MEDICINE

## 2022-03-24 PROCEDURE — 96374 THER/PROPH/DIAG INJ IV PUSH: CPT

## 2022-03-24 PROCEDURE — 99285 EMERGENCY DEPT VISIT HI MDM: CPT

## 2022-03-24 PROCEDURE — 87086 URINE CULTURE/COLONY COUNT: CPT | Performed by: EMERGENCY MEDICINE

## 2022-03-24 PROCEDURE — 80048 BASIC METABOLIC PNL TOTAL CA: CPT | Performed by: EMERGENCY MEDICINE

## 2022-03-24 PROCEDURE — 81001 URINALYSIS AUTO W/SCOPE: CPT | Performed by: EMERGENCY MEDICINE

## 2022-03-24 PROCEDURE — 93010 ELECTROCARDIOGRAM REPORT: CPT | Performed by: EMERGENCY MEDICINE

## 2022-03-24 PROCEDURE — 85025 COMPLETE CBC W/AUTO DIFF WBC: CPT | Performed by: EMERGENCY MEDICINE

## 2022-03-24 PROCEDURE — 84443 ASSAY THYROID STIM HORMONE: CPT | Performed by: EMERGENCY MEDICINE

## 2022-03-24 RX ORDER — POTASSIUM CHLORIDE 1500 MG/1
40 TABLET, FILM COATED, EXTENDED RELEASE ORAL DAILY
Qty: 6 TABLET | Refills: 0 | Status: SHIPPED | OUTPATIENT
Start: 2022-03-24

## 2022-03-24 RX ORDER — METHYLPREDNISOLONE SODIUM SUCCINATE 125 MG/2ML
60 INJECTION, POWDER, LYOPHILIZED, FOR SOLUTION INTRAMUSCULAR; INTRAVENOUS ONCE
Status: COMPLETED | OUTPATIENT
Start: 2022-03-24 | End: 2022-03-24

## 2022-03-24 RX ORDER — PREDNISONE 20 MG/1
40 TABLET ORAL DAILY
Qty: 8 TABLET | Refills: 0 | Status: SHIPPED | OUTPATIENT
Start: 2022-03-24 | End: 2022-03-28 | Stop reason: ALTCHOICE

## 2022-03-24 NOTE — TELEPHONE ENCOUNTER
Patient has been informed that CT of the Chest was not approved. Patient will like for Dr Debbi Simon to see all her my chart messages she sent to her. Patient states she was in the ER. They did a bunch of test. They put her on 5 days steroid. She was told in the ER it was possible an Autoimmune issue. She will like to see a rheumatology but she preferred a woman. She states she has an appointment with you on April. Do you want to see her early than April.

## 2022-03-24 NOTE — TELEPHONE ENCOUNTER
From: Tiffanie Peterson  To: Zainab Franklin. Jerald Cain MD  Sent: 3/24/2022 3:14 PM CDT  Subject: Follow Up From Kameron Cain-  Dr. Erasmo Samuel inferred that my symptoms can possibly be an auto immune issue. He has put me on steroid treatment for 5 days. I would like to be proactive and schedule an appointment with a rheumatologist. Can you please recommend one to me? A female is preferred but not required.   Thank you,  Nusrat Guardado

## 2022-03-24 NOTE — ED INITIAL ASSESSMENT (HPI)
Pt arrived to ED from home c/o heart palpitations x approximately 24 hours. Pt reports history of \"spasms and pain in my chest area\" x approximately 1 month. Pt denies chest pain currently.

## 2022-03-24 NOTE — TELEPHONE ENCOUNTER
Chest palp. and chest tightness. Not sleeping. Began suddenly yesterday. ER and work up negative. No caffeine X 2007. Hydrates well. No OTC Rx. Denies anxiety. Excema on forehead. Start with coronary calcium score and labs in 1 week. Hold holter.

## 2022-03-25 ENCOUNTER — PATIENT MESSAGE (OUTPATIENT)
Dept: INTERNAL MEDICINE CLINIC | Facility: CLINIC | Age: 62
End: 2022-03-25

## 2022-03-26 ENCOUNTER — TELEPHONE (OUTPATIENT)
Dept: INTERNAL MEDICINE CLINIC | Facility: CLINIC | Age: 62
End: 2022-03-26

## 2022-03-26 NOTE — TELEPHONE ENCOUNTER
Patient states she wanted to informed Dr. Jerald Cain (aware Dr. Jerald Cain off today) of her not wanting to take Prednisone as it makes her jittery. States she hasn't slept in 3 days and will be trying otc Melatonin as she's afraid to take any prescription sleep meds. Patient also constipated for 3 days. Advised Metamucil, or Senna S otc. Due to patient's new symptoms, she's wondering if Gabapentin might help somewhat. Lastly, wanting to inform that she has her Calcium Score test scheduled for next Friday.

## 2022-03-26 NOTE — TELEPHONE ENCOUNTER
8:58 PM  Hi Dr. Joselito Frances-  Dr. Dariel Markham put me on 40 mg of prednisone for 5 days. My blood pressure is ok but I am experiencing chest tightness and a hyper. Can I drop the dosage to 20 mg a day? Thank you,  Agapito Saravia        Xxxxxx -answer to the above question is yes she can drop it to 20 mg. There is also another question regarding changing to gabapentin. I do not recommend that. Please inform her.   Thank you

## 2022-03-26 NOTE — TELEPHONE ENCOUNTER
Patient called, states she already rescheduled appointment earlier. Future Appointments   Date Time Provider Jyothi Mari   4/1/2022  2:00 PM LMB CT RM1 [facility] MOB.  CT EM [place]   4/18/2022  1:30 PM Lobo Prieto MD [facility]   4/19/2022  6:30 PM Roise Meigs., MD [id number] [facility] [place] [address]   6/3/2022 10:20 AM [facility] Liao MD [facility]

## 2022-03-26 NOTE — TELEPHONE ENCOUNTER
[employer] message sent with recommendation. Please see 3/25/22 patient message regarding prednisone side effect.

## 2022-03-27 RX ORDER — GABAPENTIN 100 MG/1
100 CAPSULE ORAL 3 TIMES DAILY
Qty: 90 CAPSULE | Refills: 1 | Status: SHIPPED | OUTPATIENT
Start: 2022-03-27

## 2022-03-27 NOTE — TELEPHONE ENCOUNTER
1.Unfortunately one of side effects of prednisone. Should go away. 2.How's BM? 3. Can try gabapentin 100 mg at night for 2 weeks and if want to try to increase to 200 mg at night can. Rx. Written for 3 times a day to increase if needed. Grogginess is side effect. Start slow. Rx. Sent. Takes 1-2 weeks to take effect.

## 2022-03-28 ENCOUNTER — TELEPHONE (OUTPATIENT)
Dept: INTERNAL MEDICINE CLINIC | Facility: CLINIC | Age: 62
End: 2022-03-28

## 2022-03-28 ENCOUNTER — PATIENT MESSAGE (OUTPATIENT)
Dept: INTERNAL MEDICINE CLINIC | Facility: CLINIC | Age: 62
End: 2022-03-28

## 2022-03-28 RX ORDER — OMEPRAZOLE 40 MG/1
40 CAPSULE, DELAYED RELEASE ORAL DAILY
Qty: 30 CAPSULE | Refills: 0 | Status: SHIPPED | OUTPATIENT
Start: 2022-03-28

## 2022-03-31 ENCOUNTER — PATIENT MESSAGE (OUTPATIENT)
Dept: INTERNAL MEDICINE CLINIC | Facility: CLINIC | Age: 62
End: 2022-03-31

## 2022-04-01 ENCOUNTER — LAB ENCOUNTER (OUTPATIENT)
Dept: LAB | Facility: HOSPITAL | Age: 62
End: 2022-04-01
Attending: INTERNAL MEDICINE
Payer: COMMERCIAL

## 2022-04-01 ENCOUNTER — HOSPITAL ENCOUNTER (OUTPATIENT)
Dept: CT IMAGING | Age: 62
Discharge: HOME | End: 2022-04-01
Attending: INTERNAL MEDICINE
Payer: COMMERCIAL

## 2022-04-01 DIAGNOSIS — R21 RASH: ICD-10-CM

## 2022-04-01 DIAGNOSIS — R07.89 CHEST TIGHTNESS: ICD-10-CM

## 2022-04-01 DIAGNOSIS — E87.6 LOW BLOOD POTASSIUM: ICD-10-CM

## 2022-04-01 LAB
ANION GAP SERPL CALC-SCNC: 6 MMOL/L (ref 0–18)
BUN BLD-MCNC: 15 MG/DL (ref 7–18)
BUN/CREAT SERPL: 17.2 (ref 10–20)
CALCIUM BLD-MCNC: 9.8 MG/DL (ref 8.5–10.1)
CHLORIDE SERPL-SCNC: 105 MMOL/L (ref 98–112)
CO2 SERPL-SCNC: 31 MMOL/L (ref 21–32)
CREAT BLD-MCNC: 0.87 MG/DL
ERYTHROCYTE [SEDIMENTATION RATE] IN BLOOD: 11 MM/HR
FASTING STATUS PATIENT QL REPORTED: NO
GLUCOSE BLD-MCNC: 84 MG/DL (ref 70–99)
OSMOLALITY SERPL CALC.SUM OF ELEC: 294 MOSM/KG (ref 275–295)
POTASSIUM SERPL-SCNC: 3.9 MMOL/L (ref 3.5–5.1)
SODIUM SERPL-SCNC: 142 MMOL/L (ref 136–145)

## 2022-04-01 PROCEDURE — 36415 COLL VENOUS BLD VENIPUNCTURE: CPT

## 2022-04-01 PROCEDURE — 80048 BASIC METABOLIC PNL TOTAL CA: CPT

## 2022-04-01 PROCEDURE — 85652 RBC SED RATE AUTOMATED: CPT

## 2022-04-01 NOTE — PROGRESS NOTES
BMP Normal (electrolytes, sugar, kidney function),   Sed rate which is a nonspecific marker of inflammation is normal.

## 2022-04-04 ENCOUNTER — TELEPHONE (OUTPATIENT)
Dept: INTERNAL MEDICINE CLINIC | Facility: CLINIC | Age: 62
End: 2022-04-04

## 2022-04-04 ENCOUNTER — PATIENT MESSAGE (OUTPATIENT)
Dept: INTERNAL MEDICINE CLINIC | Facility: CLINIC | Age: 62
End: 2022-04-04

## 2022-04-04 NOTE — TELEPHONE ENCOUNTER
Only problem is we had an issue with CT of the abdomen in terms of insurance approval.  I can give her the order. But she may end up paying for that?

## 2022-04-04 NOTE — TELEPHONE ENCOUNTER
Yessi calling from [facility] requesting if we can fax over pts CT order. Pt has appt with them this evening.    Please fax to [phone]

## 2022-04-04 NOTE — TELEPHONE ENCOUNTER
Dr. Jerome Merchant, patient called in regards to her [employer] message requesting that an order for CT abdomen WITH contrast be ordered today and faxed to [address] so that she can try to have it done at the same time as her CT chest at 5:30pm today. Also, patient states that she could not get an appointment with GI until 6/2 and wants to know if it is indicated that she should be seen sooner.   Future Appointments   Date Time Provider Jyothi Martins   4/18/2022  1:30 PM Ina Schlatter, MD [facility]   4/19/2022  6:30 PM Brenda Thapa MD [id number] [facility] [place] [address]   6/2/2022  8:00 AM MARK [facility]   6/3/2022 10:20 AM Marcela Nava MD [facility]

## 2022-04-04 NOTE — TELEPHONE ENCOUNTER
CT abd order faxed to [address] [place] [address]. Dr. Carlene Plata, do you have any feedback about her GI appointment not being until June. Is it more urgent?

## 2022-04-05 ENCOUNTER — TELEPHONE (OUTPATIENT)
Dept: INTERNAL MEDICINE CLINIC | Facility: CLINIC | Age: 62
End: 2022-04-05

## 2022-04-05 NOTE — TELEPHONE ENCOUNTER
Verified name and . Results/recommendations reviewed with pt and pt verb understanding    Pt states she can't get into GI until  and pt asking if she can be seen sooner. Message routed to GI for assist to see if possible be can have sooner OV. Thank you.

## 2022-04-05 NOTE — TELEPHONE ENCOUNTER
CT abdomen pelvis with and without contrast shows a liver and gallbladder nothing suspicious there is some dilation of the bile duct which is borderline. Needs to be further evaluated with a gastroenterologist.  Nothing acute in the abdomen. Large amount of stool in the abdomen. The aorta is normal looking. Is calcium deposits on the aorta with careful with diet exercise controlling risk factors blood pressure etc. that should help. No enlarged lymph nodes kidneys look normal there is some small cysts on the kidney but no stones or obstructions. It is normal to have cysts on the kidney after age 36. They do not impair kidney function. Renal cysts are common over the age of 36; they are not cancerous or precancerous and no further treatment is needed. CT of the chest shows some old scarring but nothing acute thyroid looks okay. No lymph nodes in the armpits. No enlargement noted otherwise.

## 2022-04-05 NOTE — TELEPHONE ENCOUNTER
Spoke with Lázaro Barriga & confirmed . Offered cancellation on Iris's office schedule tomorrow but declined due to work conflict. Accepted next Thursday () with abel at [facility] office. Very appreciative for accomodation. Verified time, location and to arrive 15 minutes early. Patient expressed understanding with no further questions or concerns at this time.      Future Appointments   Date Time Provider Jyothi Martins   2022  1:30 PM Servando Glaser [address]

## 2022-04-06 ENCOUNTER — ORDER TRANSCRIPTION (OUTPATIENT)
Dept: ADMINISTRATIVE | Facility: HOSPITAL | Age: 62
End: 2022-04-06

## 2022-04-06 ENCOUNTER — OFFICE VISIT (OUTPATIENT)
Dept: GASTROENTEROLOGY | Facility: CLINIC | Age: 62
End: 2022-04-06
Payer: COMMERCIAL

## 2022-04-06 ENCOUNTER — TELEPHONE (OUTPATIENT)
Dept: GASTROENTEROLOGY | Facility: CLINIC | Age: 62
End: 2022-04-06

## 2022-04-06 VITALS
DIASTOLIC BLOOD PRESSURE: 88 MMHG | SYSTOLIC BLOOD PRESSURE: 159 MMHG | BODY MASS INDEX: 27.88 KG/M2 | WEIGHT: 142 LBS | HEART RATE: 56 BPM | HEIGHT: 60 IN

## 2022-04-06 DIAGNOSIS — R93.89 ABNORMAL CT SCAN: ICD-10-CM

## 2022-04-06 DIAGNOSIS — R10.13 EPIGASTRIC PAIN: Primary | ICD-10-CM

## 2022-04-06 DIAGNOSIS — R19.4 CHANGE IN BOWEL HABITS: ICD-10-CM

## 2022-04-06 DIAGNOSIS — R19.5 DARK STOOLS: ICD-10-CM

## 2022-04-06 DIAGNOSIS — R63.4 WEIGHT LOSS: ICD-10-CM

## 2022-04-06 DIAGNOSIS — R11.0 NAUSEA: ICD-10-CM

## 2022-04-06 DIAGNOSIS — K59.00 CONSTIPATION, UNSPECIFIED CONSTIPATION TYPE: ICD-10-CM

## 2022-04-06 PROCEDURE — 99244 OFF/OP CNSLTJ NEW/EST MOD 40: CPT | Performed by: NURSE PRACTITIONER

## 2022-04-06 PROCEDURE — 3008F BODY MASS INDEX DOCD: CPT | Performed by: NURSE PRACTITIONER

## 2022-04-06 PROCEDURE — 3079F DIAST BP 80-89 MM HG: CPT | Performed by: NURSE PRACTITIONER

## 2022-04-06 PROCEDURE — 3077F SYST BP >= 140 MM HG: CPT | Performed by: NURSE PRACTITIONER

## 2022-04-06 RX ORDER — POLYETHYLENE GLYCOL 3350, SODIUM CHLORIDE, SODIUM BICARBONATE, POTASSIUM CHLORIDE 420; 11.2; 5.72; 1.48 G/4L; G/4L; G/4L; G/4L
POWDER, FOR SOLUTION ORAL
Qty: 4000 ML | Refills: 0 | Status: SHIPPED | OUTPATIENT
Start: 2022-04-06 | End: 2022-04-06 | Stop reason: CLARIF

## 2022-04-06 RX ORDER — DOCUSATE SODIUM 100 MG/1
100 CAPSULE, LIQUID FILLED ORAL 2 TIMES DAILY
COMMUNITY

## 2022-04-06 RX ORDER — POLYETHYLENE GLYCOL 3350, SODIUM CHLORIDE, SODIUM BICARBONATE, POTASSIUM CHLORIDE 420; 11.2; 5.72; 1.48 G/4L; G/4L; G/4L; G/4L
POWDER, FOR SOLUTION ORAL
Qty: 4000 ML | Refills: 0 | Status: SHIPPED | OUTPATIENT
Start: 2022-04-06

## 2022-04-06 NOTE — TELEPHONE ENCOUNTER
Scheduled for:  Colonoscopy 605[address]/EGD [address]  Provider Name: Dr Liz Knight   Date: Fri 5/20/2022   Location: [facility]   Sedation: MAC   Time: 2 pm, (pt is aware that [facility] will call the day before to confirm arrival time)  Prep: split trilyte    Meds/Allergies Reconciled?: reviewed by provider   Diagnosis with codes: weight loss R63.4, epigastric pain R10.13, dark stools R19.4, change in BM's R19.8, constipation K59.00  Was patient informed to call insurance with codes (Y/N):  Yes  Referral sent?:  Yes  [facility] or [address] notified?: Electronic case request was sent to [facility] via [employer]. Medication Orders: -Buy over the counter dulcolax laxative, and take one tablet daily for 3 days prior to drinking the bowel prep. Pt is aware to NOT take iron pills, herbal meds and diet supplements for 7 days before exam. Also to NOT take any form of alcohol, recreational drugs and any forms of ED meds 24 hours before exam.       Misc Orders:  Patient was informed that they will need a COVID 19 test prior to their procedure. Patient verbally understood & will await a phone call from [facility] to schedule. Further instructions given by staff:   Instructions given and pt verbalized understanding

## 2022-04-08 ENCOUNTER — TELEPHONE (OUTPATIENT)
Dept: INTERNAL MEDICINE CLINIC | Facility: CLINIC | Age: 62
End: 2022-04-08

## 2022-04-08 NOTE — PAT NURSING NOTE
S/w Darshan from Dr Pramod Zurita at  re: H&P needed within 30 days prior to MRI on 4/22/22 which was done by MARK Alvarado needs to be co sign by MD. She will relay the message to MD.

## 2022-04-08 NOTE — TELEPHONE ENCOUNTER
Patient is going in for a Mri on April 22 under sedation she saw Ramo moss on 4-6-22.  They need a MD signature from pcp for h and p

## 2022-04-11 ENCOUNTER — TELEPHONE (OUTPATIENT)
Dept: GASTROENTEROLOGY | Facility: CLINIC | Age: 62
End: 2022-04-11

## 2022-04-11 ENCOUNTER — TELEPHONE (OUTPATIENT)
Dept: INTERNAL MEDICINE CLINIC | Facility: CLINIC | Age: 62
End: 2022-04-11

## 2022-04-11 NOTE — TELEPHONE ENCOUNTER
Per deyanirat response, patient verbalized she is cancelling mri appointment through [facility]. Would like orders mailed to address so she can complete elsewhere. Printed MRI/MRCP orders & mailed to home address. No further action required at this time. stretcher

## 2022-04-11 NOTE — TELEPHONE ENCOUNTER
Patient sent [employer] requesting MRI orders without sedation & verbalized she wants go somewhere else. Responded via [employer] to clarify her wishes prior to mailing out orders to home address.

## 2022-04-11 NOTE — TELEPHONE ENCOUNTER
Alie/JEREMIAH at [address] [place] [address] called regarding H&P from 4/6/22. This needs to be cosigned by doctor before pts MRI that is scheduled for 4/22/22. Please update H&P in Epic.

## 2022-04-13 ENCOUNTER — TELEPHONE (OUTPATIENT)
Dept: CASE MANAGEMENT | Age: 62
End: 2022-04-13

## 2022-04-13 ENCOUNTER — PATIENT MESSAGE (OUTPATIENT)
Dept: GASTROENTEROLOGY | Facility: CLINIC | Age: 62
End: 2022-04-13

## 2022-04-13 ENCOUNTER — PATIENT MESSAGE (OUTPATIENT)
Dept: INTERNAL MEDICINE CLINIC | Facility: CLINIC | Age: 62
End: 2022-04-13

## 2022-04-13 NOTE — TELEPHONE ENCOUNTER
Good Afternoon Dr Richy Shah and staff,    Ref# [id number] - DENIED    CT [facility] and did not meet medical necissity. No option for peer to peer is available for this case. Please have office staff reach out to patient to advise CT denied and what next steps of treatment are.     Thank you for your help,    [facility]

## 2022-04-14 NOTE — TELEPHONE ENCOUNTER
From: Aimee Rubio  To: Dannielle Coffey. MARK Martinez  Sent: 4/13/2022 4:05 PM CDT  Subject: Prilosec    Kaylah Numbers been taking Prilosec for 16 days 40 mg twice a day. My research shows 14 days is the longest to be taking it. Please advise. Thank you.

## 2022-04-15 NOTE — TELEPHONE ENCOUNTER
Please call patient explained to her and see if she wants to see the lung doctor she can try to get the study but she will end up paying out-of-pocket

## 2022-04-15 NOTE — TELEPHONE ENCOUNTER
Called patient and she states that this is an old issue and that she had imaging done at [facility] quite awhile ago and that the results were supposed to be sent to Dr. Arthur Barton

## 2022-04-16 NOTE — TELEPHONE ENCOUNTER
Refill passed protocol, but RN unable to send due to warning    Drug-Drug: Triamterene-HCTZ and Potassium Chloride ER  Co-administration of Potassium Preparations and Potassium-Sparing Diuretics may cause hyperkalemia, possibly leading to cardiac arrhythmias or cardiac arrest.

## 2022-04-16 NOTE — TELEPHONE ENCOUNTER
Refill passed per [facility] protocol.      Requested Prescriptions   Pending Prescriptions Disp Refills    Triamterene-HCTZ 37.5-25 MG Oral Tab [Pharmacy Med Name: TRIAMTERENE 37.5MG/ HCTZ 25MG TABS] 90 tablet 1     Sig: TAKE 1/4 TABLET BY MOUTH EVERY MORNING        Hypertensive Medications Protocol Passed - 4/15/2022  7:55 PM        Passed - CMP or BMP in past 12 months        Passed - Appointment in past 6 or next 3 months        Passed - GFR Non- > 50     Lab Results   Component Value Date    GFRNAA 72 04/01/2022                        Recent Outpatient Visits              1 week ago Epigastric pain    [address] Amye Lazar, APRSTEFANIE    Office Visit    1 month ago Hypertension, [facility], [address], Farnaz Sargent MD    Office Visit    1 month ago Hypertension, [facility], [address], Cailin Gaona APRN    Office Visit    5 months ago Vitamin B 12 deficiency    [place] [facility], [address], Farnaz Sargent MD    Office Visit    1 year ago Hypertension, [facility], [address], Farnaz Sargent MD    Office Visit             Future Appointments         Provider Department Appt Notes    In 4 days Toni Chino MD [place] [employer] [facility], [address], [place] physical    In 1 month [address], PROCEDURE [address] GI PROCEDURE Colonoscopy/EGD @ [address]     In 1 month Soumya Wick MD [facility], [address] [facility] comp and pap - care partner and mask policy informed

## 2022-04-16 NOTE — TELEPHONE ENCOUNTER
Refill passed per [address] [place] protocol.      Requested Prescriptions   Pending Prescriptions Disp Refills    TRIAMTERENE-HCTZ 37.5-25 MG Oral Tab [Pharmacy Med Name: TRIAMTERENE 37.5MG/ HCTZ 25MG TABS] 90 tablet 1     Sig: TAKE 1/4 TABLET BY MOUTH EVERY MORNING        Hypertensive Medications Protocol Passed - 4/15/2022  7:55 PM        Passed - CMP or BMP in past 12 months        Passed - Appointment in past 6 or next 3 months        Passed - GFR Non- > 50     Lab Results   Component Value Date    GFRNAA 72 04/01/2022                        Recent Outpatient Visits              1 week ago Epigastric pain    [address], Khadar Blades, APRN    Office Visit    1 month ago Hypertension, benign    [address] [place], [address], Sherell Lesches., MD    Office Visit    1 month ago Hypertension, benign    [address] [place], [address], Cailin Gaona, APRN    Office Visit    5 months ago Vitamin B 12 deficiency    [address] [place], [address], Sherell Lesches., MD    Office Visit    1 year ago Hypertension, benign    [address] [place], [address], Sherell Lesches., MD    Office Visit             Future Appointments         Provider Department Appt Notes    In 4 days David Sarah MD [address] [place], [address], [place] physical    In 1 month [address], PROCEDURE [address] GI PROCEDURE Colonoscopy/EGD @ [facility]    In 1 month Freddie Brown MD [facility], [address] comp and pap - care partner and mask policy informed Mohs Histo Method Verbiage: Each section was then chromacoded and processed in the Mohs lab using the Mohs protocol and submitted for frozen section.

## 2022-04-17 RX ORDER — TRIAMTERENE AND HYDROCHLOROTHIAZIDE 37.5; 25 MG/1; MG/1
TABLET ORAL
Qty: 90 TABLET | Refills: 1 | Status: SHIPPED | OUTPATIENT
Start: 2022-04-17 | End: 2022-07-06

## 2022-04-18 PROBLEM — M25.532 LEFT WRIST PAIN: Status: RESOLVED | Noted: 2020-11-09 | Resolved: 2022-04-18

## 2022-04-19 ENCOUNTER — PATIENT MESSAGE (OUTPATIENT)
Dept: GASTROENTEROLOGY | Facility: CLINIC | Age: 62
End: 2022-04-19

## 2022-04-19 ENCOUNTER — OFFICE VISIT (OUTPATIENT)
Dept: INTERNAL MEDICINE CLINIC | Facility: CLINIC | Age: 62
End: 2022-04-19
Payer: COMMERCIAL

## 2022-04-19 VITALS
DIASTOLIC BLOOD PRESSURE: 75 MMHG | TEMPERATURE: 98 F | SYSTOLIC BLOOD PRESSURE: 120 MMHG | HEIGHT: 64 IN | WEIGHT: 145.19 LBS | RESPIRATION RATE: 16 BRPM | OXYGEN SATURATION: 98 % | BODY MASS INDEX: 24.79 KG/M2 | HEART RATE: 54 BPM

## 2022-04-19 DIAGNOSIS — N87.9 CERVICAL DYSPLASIA: ICD-10-CM

## 2022-04-19 DIAGNOSIS — D04.9 BOWEN'S DISEASE: ICD-10-CM

## 2022-04-19 DIAGNOSIS — E53.8 VITAMIN B 12 DEFICIENCY: ICD-10-CM

## 2022-04-19 DIAGNOSIS — I10 HYPERTENSION, BENIGN: ICD-10-CM

## 2022-04-19 DIAGNOSIS — R73.9 HYPERGLYCEMIA: ICD-10-CM

## 2022-04-19 DIAGNOSIS — N95.2 ATROPHIC VAGINITIS: ICD-10-CM

## 2022-04-19 DIAGNOSIS — K21.9 GASTROESOPHAGEAL REFLUX DISEASE WITHOUT ESOPHAGITIS: ICD-10-CM

## 2022-04-19 DIAGNOSIS — Z12.4 PAP SMEAR FOR CERVICAL CANCER SCREENING: ICD-10-CM

## 2022-04-19 DIAGNOSIS — E78.00 HYPERCHOLESTEROLEMIA: ICD-10-CM

## 2022-04-19 DIAGNOSIS — Z00.00 ADULT GENERAL MEDICAL EXAM: Primary | ICD-10-CM

## 2022-04-19 DIAGNOSIS — E87.6 HYPOKALEMIA: ICD-10-CM

## 2022-04-19 DIAGNOSIS — N90.4 LICHEN SCLEROSUS OF FEMALE GENITALIA: ICD-10-CM

## 2022-04-19 DIAGNOSIS — M85.89 OSTEOPENIA OF MULTIPLE SITES: ICD-10-CM

## 2022-04-19 DIAGNOSIS — Z12.31 ENCOUNTER FOR SCREENING MAMMOGRAM FOR MALIGNANT NEOPLASM OF BREAST: ICD-10-CM

## 2022-04-19 DIAGNOSIS — H25.13 NUCLEAR SCLEROSIS OF BOTH EYES: ICD-10-CM

## 2022-04-19 DIAGNOSIS — H35.342 MACULAR HOLE OF LEFT EYE: ICD-10-CM

## 2022-04-19 DIAGNOSIS — C44.42 SQUAMOUS CELL CANCER OF SCALP AND SKIN OF NECK: ICD-10-CM

## 2022-04-19 DIAGNOSIS — Z12.11 COLON CANCER SCREENING: ICD-10-CM

## 2022-04-19 DIAGNOSIS — Z71.85 VACCINE COUNSELING: ICD-10-CM

## 2022-04-19 DIAGNOSIS — K59.00 CONSTIPATION, UNSPECIFIED CONSTIPATION TYPE: ICD-10-CM

## 2022-04-19 PROCEDURE — 3078F DIAST BP <80 MM HG: CPT | Performed by: INTERNAL MEDICINE

## 2022-04-19 PROCEDURE — 3008F BODY MASS INDEX DOCD: CPT | Performed by: INTERNAL MEDICINE

## 2022-04-19 PROCEDURE — 99396 PREV VISIT EST AGE 40-64: CPT | Performed by: INTERNAL MEDICINE

## 2022-04-19 PROCEDURE — 99214 OFFICE O/P EST MOD 30 MIN: CPT | Performed by: INTERNAL MEDICINE

## 2022-04-19 PROCEDURE — 3074F SYST BP LT 130 MM HG: CPT | Performed by: INTERNAL MEDICINE

## 2022-04-19 NOTE — TELEPHONE ENCOUNTER
Faxed mri/mrcp order requisition to [facility] in [facility]. F: [phone]    Fax confirmation received 04/19/2022 at 4:31 pm.     Updated Nusrat Guardado via [facility]. Successfully faxed orders to designated imaging facility. No further action required at present.

## 2022-04-19 NOTE — TELEPHONE ENCOUNTER
From: Dede Lieberman  To: Estle Bumpers. Jenny San  Sent: 4/19/2022 1:38 PM CDT  Subject: Severo Knight-  I received your message. Please mail the order to me. Also, if you will please fax the order to [address] in [facility]. I rescheduled my MRI to May 1st at this facility. Thank you for all of your help.   Dustin Shepherd

## 2022-04-22 ENCOUNTER — APPOINTMENT (OUTPATIENT)
Dept: GENERAL RADIOLOGY | Facility: HOSPITAL | Age: 62
End: 2022-04-22
Attending: EMERGENCY MEDICINE
Payer: COMMERCIAL

## 2022-04-22 ENCOUNTER — HOSPITAL ENCOUNTER (EMERGENCY)
Facility: HOSPITAL | Age: 62
Discharge: HOME | End: 2022-04-22
Attending: EMERGENCY MEDICINE
Payer: COMMERCIAL

## 2022-04-22 ENCOUNTER — HOSPITAL ENCOUNTER (OUTPATIENT)
Dept: MRI IMAGING | Facility: HOSPITAL | Age: 62
Discharge: HOME | End: 2022-04-22
Attending: NURSE PRACTITIONER
Payer: COMMERCIAL

## 2022-04-22 VITALS
HEIGHT: 64 IN | BODY MASS INDEX: 24.41 KG/M2 | TEMPERATURE: 98 F | HEART RATE: 54 BPM | OXYGEN SATURATION: 96 % | WEIGHT: 143 LBS | DIASTOLIC BLOOD PRESSURE: 77 MMHG | SYSTOLIC BLOOD PRESSURE: 131 MMHG | RESPIRATION RATE: 11 BRPM

## 2022-04-22 DIAGNOSIS — R07.9 CHEST PAIN OF UNCERTAIN ETIOLOGY: Primary | ICD-10-CM

## 2022-04-22 LAB
ANION GAP SERPL CALC-SCNC: 3 MMOL/L (ref 0–18)
BASOPHILS # BLD AUTO: 0.04 X10(3) UL (ref 0–0.2)
BASOPHILS NFR BLD AUTO: 0.5 %
BUN BLD-MCNC: 13 MG/DL (ref 7–18)
BUN/CREAT SERPL: 14.3 (ref 10–20)
CALCIUM BLD-MCNC: 9.8 MG/DL (ref 8.5–10.1)
CHLORIDE SERPL-SCNC: 107 MMOL/L (ref 98–112)
CO2 SERPL-SCNC: 31 MMOL/L (ref 21–32)
CREAT BLD-MCNC: 0.91 MG/DL
D DIMER PPP FEU-MCNC: <0.27 UG/ML FEU (ref ?–0.62)
DEPRECATED RDW RBC AUTO: 40.8 FL (ref 35.1–46.3)
EOSINOPHIL # BLD AUTO: 0.38 X10(3) UL (ref 0–0.7)
EOSINOPHIL NFR BLD AUTO: 4.7 %
ERYTHROCYTE [DISTWIDTH] IN BLOOD BY AUTOMATED COUNT: 12.3 % (ref 11–15)
GLUCOSE BLD-MCNC: 99 MG/DL (ref 70–99)
HCT VFR BLD AUTO: 45.4 %
HGB BLD-MCNC: 15.1 G/DL
IMM GRANULOCYTES # BLD AUTO: 0.02 X10(3) UL (ref 0–1)
IMM GRANULOCYTES NFR BLD: 0.2 %
LYMPHOCYTES # BLD AUTO: 2.64 X10(3) UL (ref 1–4)
LYMPHOCYTES NFR BLD AUTO: 32.8 %
MCH RBC QN AUTO: 30.4 PG (ref 26–34)
MCHC RBC AUTO-ENTMCNC: 33.3 G/DL (ref 31–37)
MCV RBC AUTO: 91.5 FL
MONOCYTES # BLD AUTO: 0.51 X10(3) UL (ref 0.1–1)
MONOCYTES NFR BLD AUTO: 6.3 %
NEUTROPHILS # BLD AUTO: 4.45 X10 (3) UL (ref 1.5–7.7)
NEUTROPHILS # BLD AUTO: 4.45 X10(3) UL (ref 1.5–7.7)
NEUTROPHILS NFR BLD AUTO: 55.5 %
OSMOLALITY SERPL CALC.SUM OF ELEC: 292 MOSM/KG (ref 275–295)
PLATELET # BLD AUTO: 329 10(3)UL (ref 150–450)
POTASSIUM SERPL-SCNC: 3.5 MMOL/L (ref 3.5–5.1)
RBC # BLD AUTO: 4.96 X10(6)UL
SODIUM SERPL-SCNC: 141 MMOL/L (ref 136–145)
TROPONIN I HIGH SENSITIVITY: 7 NG/L
WBC # BLD AUTO: 8 X10(3) UL (ref 4–11)

## 2022-04-22 PROCEDURE — 85379 FIBRIN DEGRADATION QUANT: CPT | Performed by: EMERGENCY MEDICINE

## 2022-04-22 PROCEDURE — 84484 ASSAY OF TROPONIN QUANT: CPT | Performed by: EMERGENCY MEDICINE

## 2022-04-22 PROCEDURE — 93005 ELECTROCARDIOGRAM TRACING: CPT

## 2022-04-22 PROCEDURE — 80048 BASIC METABOLIC PNL TOTAL CA: CPT | Performed by: EMERGENCY MEDICINE

## 2022-04-22 PROCEDURE — 99284 EMERGENCY DEPT VISIT MOD MDM: CPT

## 2022-04-22 PROCEDURE — 71045 X-RAY EXAM CHEST 1 VIEW: CPT | Performed by: EMERGENCY MEDICINE

## 2022-04-22 PROCEDURE — 85025 COMPLETE CBC W/AUTO DIFF WBC: CPT | Performed by: EMERGENCY MEDICINE

## 2022-04-22 PROCEDURE — 93010 ELECTROCARDIOGRAM REPORT: CPT | Performed by: EMERGENCY MEDICINE

## 2022-04-22 PROCEDURE — 36415 COLL VENOUS BLD VENIPUNCTURE: CPT

## 2022-04-22 RX ORDER — ASPIRIN 81 MG/1
324 TABLET, CHEWABLE ORAL ONCE
Status: COMPLETED | OUTPATIENT
Start: 2022-04-22 | End: 2022-04-22

## 2022-04-23 ENCOUNTER — TELEPHONE (OUTPATIENT)
Dept: INTERNAL MEDICINE CLINIC | Facility: CLINIC | Age: 62
End: 2022-04-23

## 2022-04-23 LAB
ABSOLUTE BASOPHILS: 33 CELLS/UL (ref 0–200)
ABSOLUTE EOSINOPHILS: 271 CELLS/UL (ref 15–500)
ABSOLUTE LYMPHOCYTES: 2086 CELLS/UL (ref 850–3900)
ABSOLUTE MONOCYTES: 310 CELLS/UL (ref 200–950)
ABSOLUTE NEUTROPHILS: 3901 CELLS/UL (ref 1500–7800)
ALBUMIN/GLOBULIN RATIO: 1.9 (CALC) (ref 1–2.5)
ALBUMIN: 4.6 G/DL (ref 3.6–5.1)
ALKALINE PHOSPHATASE: 80 U/L (ref 37–153)
ALT: 27 U/L (ref 6–29)
AST: 21 U/L (ref 10–35)
BASOPHILS: 0.5 %
BILIRUBIN, TOTAL: 0.9 MG/DL (ref 0.2–1.2)
BUN: 12 MG/DL (ref 7–25)
CALCIUM: 10 MG/DL (ref 8.6–10.4)
CARBON DIOXIDE: 28 MMOL/L (ref 20–32)
CHLORIDE: 105 MMOL/L (ref 98–110)
CREATININE: 0.81 MG/DL (ref 0.5–0.99)
EGFR IF AFRICN AM: 90 ML/MIN/1.73M2
EGFR IF NONAFRICN AM: 78 ML/MIN/1.73M2
EOSINOPHILS: 4.1 %
GLOBULIN: 2.4 G/DL (CALC) (ref 1.9–3.7)
GLUCOSE: 80 MG/DL (ref 65–99)
HEMATOCRIT: 42.5 % (ref 35–45)
HEMOGLOBIN: 14.6 G/DL (ref 11.7–15.5)
LYMPHOCYTES: 31.6 %
MCH: 30.7 PG (ref 27–33)
MCHC: 34.4 G/DL (ref 32–36)
MCV: 89.5 FL (ref 80–100)
MONOCYTES: 4.7 %
MPV: 10.6 FL (ref 7.5–12.5)
NEUTROPHILS: 59.1 %
PLATELET COUNT: 311 THOUSAND/UL (ref 140–400)
POTASSIUM: 3.7 MMOL/L (ref 3.5–5.3)
PROTEIN, TOTAL: 7 G/DL (ref 6.1–8.1)
RDW: 12.6 % (ref 11–15)
RED BLOOD CELL COUNT: 4.75 MILLION/UL (ref 3.8–5.1)
SODIUM: 143 MMOL/L (ref 135–146)
VITAMIN B12: 412 PG/ML (ref 200–1100)
WHITE BLOOD CELL COUNT: 6.6 THOUSAND/UL (ref 3.8–10.8)

## 2022-04-23 RX ORDER — VALACYCLOVIR HYDROCHLORIDE 1 G/1
TABLET, FILM COATED ORAL
Qty: 21 TABLET | Refills: 0 | Status: SHIPPED | OUTPATIENT
Start: 2022-04-23 | End: 2022-04-30

## 2022-04-23 RX ORDER — ACYCLOVIR 200 MG/1
CAPSULE ORAL
Qty: 90 CAPSULE | Refills: 0 | OUTPATIENT
Start: 2022-04-23

## 2022-04-23 NOTE — TELEPHONE ENCOUNTER
Patient reports was seen in ED 4/22 for left side chest pain. No cause for the pain was found, reports ED said she might have internal shingles at this time. No prescription given by ED. Patient reports in the was taking Acyclovir 200mg daily. Patient requesting refill of Acyclovir for concerns of shingles, please advise.

## 2022-04-23 NOTE — ED INITIAL ASSESSMENT (HPI)
Patient here with L sided chest pain and pressure starting yesterday. Patient states the pain is worse today. Patient denies shortness of breath or dizziness.

## 2022-04-26 ENCOUNTER — TELEPHONE (OUTPATIENT)
Dept: INTERNAL MEDICINE CLINIC | Facility: CLINIC | Age: 62
End: 2022-04-26

## 2022-04-26 NOTE — TELEPHONE ENCOUNTER
There is prophylaxis and daily every day but only if she is having recurrent attacks. There is really not much to do to prevent attack which if she starts early enough hopefully she gets a tingling or the feeling she can stop it from actual becoming the rash but she has to start early enough. Best way is to think about the vaccine Shingrix is a series of 2 2 to 6 months apart. She gets the local pharmacy not sure about insurance coverage for her for the Shingrix.

## 2022-04-26 NOTE — TELEPHONE ENCOUNTER
Patient states this has taken over 2 months to diagnose and states wants something for preventative if possible as does not feel comfortable waiting. States vaccine was discussed two weeks ago with Dr Marce Gongora. Patient asked RN to send these questions to Dr Marce Gongora:    1. \"Once I complete the valacyclovir (this Saturday), how do I know that it's gone into remission--can we test for that?\"    2. \"If it has subsided into remission when is the soonest I can get the vaccine. If it is a matter of waiting like two months, I want to do something proactively to not go through this again. \"

## 2022-04-26 NOTE — TELEPHONE ENCOUNTER
Pt states that she has internal shingles and was given acyclovir on Saturday, would like to know what she can do after she finishes medication to keep it from flaring up again, is there any medication for that? Please advise.

## 2022-04-28 NOTE — TELEPHONE ENCOUNTER
Renetta Elizondo--    Spoke with Stacia at [facility]. Patient scheduled for mrcp Sunday, 05/01/2022. Facility wants to confirm order is not meant for both mri abdomen / mrcp. Wants to ensure ordering provider does not want abdomen. Needs new imaging orders if this needs to be included. Please clarify. Thank you! Self note: If new orders entered, fax to Marko Verduzco upon return to clinic tomorrow.      F: [phone]

## 2022-04-29 NOTE — TELEPHONE ENCOUNTER
Noted and appreciated. Spoke with Filipe Walker from [employer]. P: [phone]     Confirmed initial orders sent are correct. Verified with ordering provider. No further action required at present.

## 2022-05-02 NOTE — TELEPHONE ENCOUNTER
Dr. Samira Cui - Shingles blood test, request. Use [facility] please. RN called patient regarding Vitamin B12 test results. Patient's date of birth and full name both confirmed. She was inquiring about this encounter. RN informed patient of provider's message as detailed below. She has follow-up request:   Patient Asking for blood test anyway for shingles - She understands she will test positive, but asking how can she be sure that her current bout is over. She's concerned the Vaccine (shingles) will give make her feel worse if she is still having a bout of shingles. Wants to be sure she is in remission before getting the vaccine. Also wants Dr. Samira Cui to know:   Constipation and \"gnawing\" by her xyphoid process is persisting - but not worsening. Has EGD & Colonoscopy coming up.

## 2022-05-02 NOTE — TELEPHONE ENCOUNTER
1. If she had shingles, she will test for AB +. That will be for the rest of her life. 2. Wait 1 month for vaccine.

## 2022-05-06 ENCOUNTER — LAB ENCOUNTER (OUTPATIENT)
Dept: LAB | Facility: HOSPITAL | Age: 62
End: 2022-05-06
Attending: INTERNAL MEDICINE
Payer: COMMERCIAL

## 2022-05-06 DIAGNOSIS — R52 PAIN: ICD-10-CM

## 2022-05-06 PROCEDURE — 36415 COLL VENOUS BLD VENIPUNCTURE: CPT

## 2022-05-06 PROCEDURE — 86787 VARICELLA-ZOSTER ANTIBODY: CPT

## 2022-05-09 ENCOUNTER — PATIENT MESSAGE (OUTPATIENT)
Dept: INTERNAL MEDICINE CLINIC | Facility: CLINIC | Age: 62
End: 2022-05-09

## 2022-05-09 LAB — VZV IGG SER IA-ACNC: 1625 (ref 165–?)

## 2022-05-09 RX ORDER — ALPRAZOLAM 0.25 MG/1
0.25 TABLET ORAL ONCE AS NEEDED
Qty: 2 TABLET | Refills: 0 | Status: SHIPPED | OUTPATIENT
Start: 2022-05-09 | End: 2022-05-09

## 2022-05-10 LAB — VARICELLA-ZOSTER VIRUS AB, IGM: 0.14 ISR

## 2022-05-16 ENCOUNTER — TELEPHONE (OUTPATIENT)
Dept: GASTROENTEROLOGY | Facility: CLINIC | Age: 62
End: 2022-05-16

## 2022-05-16 NOTE — TELEPHONE ENCOUNTER
Patient contacted and advised that a nurse from [facility] will call her 2 to 3 days prior to her procedure with Covid instructions. Patient voiced understanding.

## 2022-05-16 NOTE — TELEPHONE ENCOUNTER
Pt called in stating she has not got a call yet to schedule covid-19 test prior to procedure. I advised pt will get a call but she is requesting I send a message.  Please follow up

## 2022-05-18 ENCOUNTER — LAB REQUISITION (OUTPATIENT)
Dept: SURGERY | Age: 62
End: 2022-05-18
Payer: COMMERCIAL

## 2022-05-18 DIAGNOSIS — Z01.818 PREOP EXAMINATION: ICD-10-CM

## 2022-05-19 LAB — SARS-COV-2 RNA RESP QL NAA+PROBE: NOT DETECTED

## 2022-05-20 ENCOUNTER — LAB REQUISITION (OUTPATIENT)
Dept: SURGERY | Age: 62
End: 2022-05-20
Payer: COMMERCIAL

## 2022-05-20 ENCOUNTER — SURGERY CENTER DOCUMENTATION (OUTPATIENT)
Dept: SURGERY | Age: 62
End: 2022-05-20

## 2022-05-20 ENCOUNTER — TELEPHONE (OUTPATIENT)
Dept: GASTROENTEROLOGY | Facility: CLINIC | Age: 62
End: 2022-05-20

## 2022-05-20 DIAGNOSIS — Z12.11 COLON CANCER SCREENING: ICD-10-CM

## 2022-05-20 DIAGNOSIS — K44.9 HIATAL HERNIA: ICD-10-CM

## 2022-05-20 DIAGNOSIS — K64.8 INTERNAL HEMORRHOIDS: ICD-10-CM

## 2022-05-20 DIAGNOSIS — R63.4 WEIGHT LOSS: ICD-10-CM

## 2022-05-20 DIAGNOSIS — R11.0 NAUSEA: ICD-10-CM

## 2022-05-20 DIAGNOSIS — K59.00 CONSTIPATION: ICD-10-CM

## 2022-05-20 PROCEDURE — 45378 DIAGNOSTIC COLONOSCOPY: CPT | Performed by: INTERNAL MEDICINE

## 2022-05-20 PROCEDURE — 43239 EGD BIOPSY SINGLE/MULTIPLE: CPT | Performed by: INTERNAL MEDICINE

## 2022-05-20 PROCEDURE — 88312 SPECIAL STAINS GROUP 1: CPT | Performed by: INTERNAL MEDICINE

## 2022-05-20 PROCEDURE — 88305 TISSUE EXAM BY PATHOLOGIST: CPT | Performed by: INTERNAL MEDICINE

## 2022-05-20 NOTE — TELEPHONE ENCOUNTER
Iris--    Received mri/mrcp results completed at [facility] 04/04/2022. Placed on your desk for further review.      Thank you

## 2022-05-26 ENCOUNTER — TELEPHONE (OUTPATIENT)
Dept: GASTROENTEROLOGY | Facility: CLINIC | Age: 62
End: 2022-05-26

## 2022-05-26 NOTE — TELEPHONE ENCOUNTER
Jeanne Pugh MD  [facility] Clinical Staff  GI staff: please place recall in for colonoscopy in 10 years       Results letter sent to patient via [employer] and also printed and mailed out to patient. Health maintenance updated to reflect 10 year colonoscopy recall. Patient outreach entered for 10 year colonoscopy recall. Done 5/20/2022; due 5/20/2032.

## 2022-06-01 ENCOUNTER — TELEPHONE (OUTPATIENT)
Dept: OBGYN CLINIC | Facility: CLINIC | Age: 62
End: 2022-06-01

## 2022-06-01 NOTE — TELEPHONE ENCOUNTER
Spoke to pt. Very upset about being r/s'd states has been referred by  3 months ago, and states is over due for PAP. Did let her know unfortunately MD is out with covid and that is the reason for reschduling    Provided her with CAPs hours on 6/14 and 6/16 denied them. States can only due Friday, or Saturday or late evening around 6. Let her know we would be looking at [facility] or September for preferences that she has. Asked if she was OK with seeing another provider and said no.  States since we are canceling on her we should be able to accommodate her, let her know ill send the message to  to see if there is any way we can get her on a Friday, Saturday or late evening , she is a NP

## 2022-06-02 NOTE — TELEPHONE ENCOUNTER
Pt informed the message was sent to CAP but she is out with Covid. As soon as CAP replies we will contact her. Pt states she is worried she will fall through the cracks. Does not wish to see another provider-only CAP.

## 2022-06-06 NOTE — TELEPHONE ENCOUNTER
Offered several Fridays at 1130 and they did not work for her in June. Pt wants to know if you would add her to your schedule on 7/9, Saturday, at [facility] at 1130am.  Informed pt that the message sent to CAP and when she responds we will let her know.

## 2022-06-13 ENCOUNTER — PATIENT MESSAGE (OUTPATIENT)
Dept: ADMINISTRATIVE | Age: 62
End: 2022-06-13

## 2022-06-23 ENCOUNTER — PATIENT MESSAGE (OUTPATIENT)
Dept: OBGYN CLINIC | Facility: CLINIC | Age: 62
End: 2022-06-23

## 2022-06-23 NOTE — TELEPHONE ENCOUNTER
Patient requires Saturday appt or early on a Friday. Our office cancelled her 6/3 annual.   Patient requesting appt Friday 7/8,8/12 or 8/19. CAP- okay to add to 2 OB slots or use [facility] since we cancelled her? Otherwise, can we add her on to an upcoming Saturday?

## 2022-07-06 ENCOUNTER — OFFICE VISIT (OUTPATIENT)
Dept: INTERNAL MEDICINE CLINIC | Facility: CLINIC | Age: 62
End: 2022-07-06
Payer: COMMERCIAL

## 2022-07-06 VITALS
BODY MASS INDEX: 25 KG/M2 | TEMPERATURE: 98 F | SYSTOLIC BLOOD PRESSURE: 116 MMHG | DIASTOLIC BLOOD PRESSURE: 67 MMHG | WEIGHT: 145 LBS | OXYGEN SATURATION: 98 % | HEART RATE: 56 BPM

## 2022-07-06 DIAGNOSIS — R73.9 HYPERGLYCEMIA: ICD-10-CM

## 2022-07-06 DIAGNOSIS — E87.6 HYPOKALEMIA: ICD-10-CM

## 2022-07-06 DIAGNOSIS — E53.8 VITAMIN B 12 DEFICIENCY: ICD-10-CM

## 2022-07-06 DIAGNOSIS — Z12.31 ENCOUNTER FOR SCREENING MAMMOGRAM FOR MALIGNANT NEOPLASM OF BREAST: ICD-10-CM

## 2022-07-06 DIAGNOSIS — I10 HYPERTENSION, BENIGN: Primary | ICD-10-CM

## 2022-07-06 DIAGNOSIS — M85.89 OSTEOPENIA OF MULTIPLE SITES: ICD-10-CM

## 2022-07-06 DIAGNOSIS — Z71.85 VACCINE COUNSELING: ICD-10-CM

## 2022-07-06 PROCEDURE — 3074F SYST BP LT 130 MM HG: CPT | Performed by: INTERNAL MEDICINE

## 2022-07-06 PROCEDURE — 3078F DIAST BP <80 MM HG: CPT | Performed by: INTERNAL MEDICINE

## 2022-07-06 PROCEDURE — 99213 OFFICE O/P EST LOW 20 MIN: CPT | Performed by: INTERNAL MEDICINE

## 2022-07-06 RX ORDER — VALACYCLOVIR HYDROCHLORIDE 1 G/1
TABLET, FILM COATED ORAL
COMMUNITY
Start: 2022-04-25

## 2022-07-06 RX ORDER — ATENOLOL 25 MG/1
TABLET ORAL
Qty: 180 TABLET | Refills: 1 | Status: SHIPPED | OUTPATIENT
Start: 2022-07-06

## 2022-07-06 RX ORDER — ACYCLOVIR 400 MG/1
400 TABLET ORAL 3 TIMES DAILY
Qty: 15 TABLET | Refills: 1 | Status: SHIPPED | OUTPATIENT
Start: 2022-07-06 | End: 2022-07-11

## 2022-07-06 RX ORDER — TRIAMTERENE AND HYDROCHLOROTHIAZIDE 37.5; 25 MG/1; MG/1
TABLET ORAL
Qty: 90 TABLET | Refills: 1 | Status: SHIPPED | OUTPATIENT
Start: 2022-07-06

## 2022-07-06 RX ORDER — PIMECROLIMUS 10 MG/G
CREAM TOPICAL
COMMUNITY
Start: 2022-06-29

## 2022-07-06 RX ORDER — ATORVASTATIN CALCIUM 20 MG/1
20 TABLET, FILM COATED ORAL EVERY EVENING
Qty: 90 TABLET | Refills: 1 | Status: SHIPPED | OUTPATIENT
Start: 2022-07-06

## 2022-07-06 RX ORDER — DESONIDE 0.5 MG/G
CREAM TOPICAL 2 TIMES DAILY
COMMUNITY
Start: 2022-06-16

## 2022-07-08 ENCOUNTER — PATIENT MESSAGE (OUTPATIENT)
Dept: INTERNAL MEDICINE CLINIC | Facility: CLINIC | Age: 62
End: 2022-07-08

## 2022-07-08 NOTE — TELEPHONE ENCOUNTER
From: Roxana Monday  To: Jaqueline Gomez. Eufemia Elkins MD  Sent: 7/8/2022 9:23 AM CDT  Subject: Derm results     -  Here are the results from biopsy done at [facility].   Thank you,  Arnaldo Pollock

## 2022-07-09 ENCOUNTER — LAB ENCOUNTER (OUTPATIENT)
Dept: LAB | Facility: HOSPITAL | Age: 62
End: 2022-07-09
Attending: INTERNAL MEDICINE
Payer: COMMERCIAL

## 2022-07-09 ENCOUNTER — OFFICE VISIT (OUTPATIENT)
Dept: RHEUMATOLOGY | Facility: CLINIC | Age: 62
End: 2022-07-09
Payer: COMMERCIAL

## 2022-07-09 VITALS
SYSTOLIC BLOOD PRESSURE: 130 MMHG | HEIGHT: 64 IN | BODY MASS INDEX: 24.75 KG/M2 | DIASTOLIC BLOOD PRESSURE: 76 MMHG | WEIGHT: 145 LBS | HEART RATE: 53 BPM

## 2022-07-09 DIAGNOSIS — L30.9 ECZEMA, UNSPECIFIED TYPE: ICD-10-CM

## 2022-07-09 DIAGNOSIS — R21 RASH: ICD-10-CM

## 2022-07-09 DIAGNOSIS — R21 RASH: Primary | ICD-10-CM

## 2022-07-09 DIAGNOSIS — R10.13 EPIGASTRIC PAIN: ICD-10-CM

## 2022-07-09 LAB
C3 SERPL-MCNC: 128 MG/DL (ref 90–180)
C4 SERPL-MCNC: 30.8 MG/DL (ref 10–40)
CHOLEST SERPL-MCNC: 163 MG/DL (ref ?–200)
FASTING PATIENT LIPID ANSWER: YES
HDLC SERPL-MCNC: 68 MG/DL (ref 40–59)
LDLC SERPL CALC-MCNC: 77 MG/DL (ref ?–100)
NONHDLC SERPL-MCNC: 95 MG/DL (ref ?–130)
RHEUMATOID FACT SERPL-ACNC: <10 IU/ML (ref ?–15)
TRIGL SERPL-MCNC: 99 MG/DL (ref 30–149)
VLDLC SERPL CALC-MCNC: 15 MG/DL (ref 0–30)

## 2022-07-09 PROCEDURE — 99244 OFF/OP CNSLTJ NEW/EST MOD 40: CPT | Performed by: INTERNAL MEDICINE

## 2022-07-09 PROCEDURE — 3078F DIAST BP <80 MM HG: CPT | Performed by: INTERNAL MEDICINE

## 2022-07-09 PROCEDURE — 86235 NUCLEAR ANTIGEN ANTIBODY: CPT

## 2022-07-09 PROCEDURE — 86431 RHEUMATOID FACTOR QUANT: CPT

## 2022-07-09 PROCEDURE — 86160 COMPLEMENT ANTIGEN: CPT

## 2022-07-09 PROCEDURE — 36415 COLL VENOUS BLD VENIPUNCTURE: CPT

## 2022-07-09 PROCEDURE — 80061 LIPID PANEL: CPT | Performed by: INTERNAL MEDICINE

## 2022-07-09 PROCEDURE — 3008F BODY MASS INDEX DOCD: CPT | Performed by: INTERNAL MEDICINE

## 2022-07-09 PROCEDURE — 3075F SYST BP GE 130 - 139MM HG: CPT | Performed by: INTERNAL MEDICINE

## 2022-07-09 PROCEDURE — 86038 ANTINUCLEAR ANTIBODIES: CPT

## 2022-07-09 PROCEDURE — 86225 DNA ANTIBODY NATIVE: CPT

## 2022-07-09 NOTE — PROGRESS NOTES
Dear Dr. Doris Roman: I saw your patient Heaven Simon in consultation this morning at your request, regarding possible autoimmune disorder. As you know, she is a 58-year-old woman who has had eczema her whole life. She has seen a number of dermatologists, most recently with [facility] in [place]. A biopsy was done of the skin over her left upper shoulder/neck 4 weeks ago, and was consistent with spongiotic dermatitis. Changes of lupus were not seen. She has had a lot of rash in the front and back of her neck, around her ears, and on her scalp. Topicals help minimally. She scratches. Ketoconazole shampoo decreases her flaking. She has lichen sclerosus/HPV, which flares when she is stressed. Triamcinolone works for this. She has had basal cells and squamous cells removed, so she avoids the sun. At the end of February of 2022, she developed a spasm, gnawing pain in her epigastric and xiphoid area, that took her breath away. She went to the emergency room. The discomfort radiated to her back. She received 40 mg of steroids intravenously, and took oral prednisone for several days. She was very hyperactive, so stopped it. It helped 50%. In follow-up TSH was normal March 23rd, 2022. April 22nd, 2022, CBC, CMP, troponin, D-dimer, B12, and sed rate of 11 were normal.  An LUCA was negative in September of 2018. She is seeing gastroenterology. Upper endoscopy and colonoscopy were fine. She saw her cardiologist.  Her cholesterol medicine was changed, but she has not noticed any difference. Her eyelids are flaky. There is flaking on her scalp. When she was in the emergency room in late February the skin over her forehead looked burnt. It was not palpable. She has dry eyes and prefers Systane eyedrops. She has dry mouth only first thing in the morning. She has noted hair thinning for several years. Past medical history:  She has acid reflux.   She has taken PPIs in the past but stopped them a month ago. She has hypertension, high cholesterol. She has a history of cervical dysplasia. She has had 2 C-sections. She is on acyclovir as needed for genital herpes, atenolol, atorvastatin, topicals, triamterene with hydrochlorothiazide. No known drug allergies. Family history:  She does not know of any autoimmune disorders. She was adopted but did not meet her biologic parents. Social history:  She is single. 2 grown children. 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 No cigarettes or alcohol. She avoids caffeine. She is very active walking, biking, etc.    Review of systems:  She had body chills for 3 months after her visit to the emergency room in late February 2022. She has had insomnia which has improved. She is usually refreshed on awakening. Her energy is good. When she gets out of bed in the morning her joints are sore especially her low back up to her neck. She has several herniated disks in her neck. Her peripheral joints have not been swollen. She stretches and loosens up in the morning. No anorexia. She has hiatal hernia. Starting in March 2022 she intentionally lost from 152 to 139 pounds. She cut out a lot of foods. She is back up to 145 pounds. No internal inflammation, oral or nasal ulcers, lymphadenopathy. No shortness of breath or chest pain. She has acid reflux but not daily. She avoids foods that precipitated. She has a lot less of the epigastric discomfort that she had at the end of February 2022. She was very constipated at the end of February 2022. She is more regular now. No blood in her stools. No trouble urinating. No Raynaud's. She has sinus headaches which tend to be seasonal and related to her allergies. Physical exam:  Pleasant woman in no acute distress. Blood pressure 130/76, pulse 53, height 5' 4\" (1.626 m), weight 145 lb (65.8 kg), not currently breastfeeding.   Her fingers are very dry with some cracking. There are some erythematous areas about her upper neck, posterior neck, around her ears which have been excoriated. No obvious alopecia. No conjunctival injection. No nasal or oral lesions. No cervical adenopathy. Lungs clear. S1 and S2 regular. Abdomen without hepatosplenomegaly or tenderness. Normal bowel sounds. No lower extremity edema. Neck moves well. Shoulders, elbows, wrists, and hands are unremarkable without synovitis. Capillary refill is good in her fingertips. Hips, knees, ankles, and feet are unremarkable. Deltoid and iliopsoas strength is 5 out of 5 bilaterally. Assessment and plan:    1. Rule out autoimmune disorder. It is unlikely. She has not had sun induced rashes. She has not had inflammatory arthritis. Her skin biopsy did not show changes of lupus. She has dry eyes. LUCA, complements, specific antibodies, and rheumatoid factor will be done. I will give her a call with the results, hopefully with good news. 2.  Lifelong history of eczema. Skin biopsy about her left upper shoulder/neck 4 weeks ago was consistent with spongiotic dermatitis. She is on multiple topicals. 3.  Acid reflux. Severe epigastric/xiphoid discomfort at the end of February 2022, which took her to her ER. The cause was never found. It is much improved. She had normal colonoscopy and upper endoscopy. 4.  Hypertension, high cholesterol. 5.  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 Thank you for inviting me to participate in her care. I will be glad to see her back if necessary.       Sincerely,      Nelson Ortiz MD   Rheumatology

## 2022-07-10 ENCOUNTER — PATIENT MESSAGE (OUTPATIENT)
Dept: RHEUMATOLOGY | Facility: CLINIC | Age: 62
End: 2022-07-10

## 2022-07-11 LAB — NUCLEAR IGG TITR SER IF: NEGATIVE {TITER}

## 2022-07-12 LAB — DSDNA AB TITR SER: <10 {TITER}

## 2022-07-13 ENCOUNTER — TELEPHONE (OUTPATIENT)
Dept: RHEUMATOLOGY | Facility: CLINIC | Age: 62
End: 2022-07-13

## 2022-07-13 LAB
ENA SM IGG SER QL: NEGATIVE
ENA SM+RNP AB SER QL: NEGATIVE
ENA SS-A AB SER QL IA: NEGATIVE
ENA SS-B AB SER QL IA: NEGATIVE

## 2022-07-14 NOTE — TELEPHONE ENCOUNTER
I called Poly Cardozo with her lab results from July 9th, 2022. LUCA, rheumatoid factor, C3 and C4 complements, and specific antibodies to double-stranded DNA, SSA, SSB, Smith, and RNP are negative. I reassured her that she does not have lupus.     She will follow-up with her dermatologist

## 2022-08-19 ENCOUNTER — OFFICE VISIT (OUTPATIENT)
Dept: OBGYN CLINIC | Facility: CLINIC | Age: 62
End: 2022-08-19
Payer: COMMERCIAL

## 2022-08-19 VITALS
SYSTOLIC BLOOD PRESSURE: 140 MMHG | DIASTOLIC BLOOD PRESSURE: 76 MMHG | WEIGHT: 146 LBS | HEIGHT: 63.5 IN | HEART RATE: 55 BPM | BODY MASS INDEX: 25.55 KG/M2

## 2022-08-19 DIAGNOSIS — Z98.890 S/P LEEP OF CERVIX: ICD-10-CM

## 2022-08-19 DIAGNOSIS — N90.4 LICHEN SCLEROSUS OF VULVA: ICD-10-CM

## 2022-08-19 DIAGNOSIS — Z01.419 ENCOUNTER FOR GYNECOLOGICAL EXAMINATION WITHOUT ABNORMAL FINDING: Primary | ICD-10-CM

## 2022-08-19 PROCEDURE — 3077F SYST BP >= 140 MM HG: CPT | Performed by: OBSTETRICS & GYNECOLOGY

## 2022-08-19 PROCEDURE — 3078F DIAST BP <80 MM HG: CPT | Performed by: OBSTETRICS & GYNECOLOGY

## 2022-08-19 PROCEDURE — 99386 PREV VISIT NEW AGE 40-64: CPT | Performed by: OBSTETRICS & GYNECOLOGY

## 2022-08-19 PROCEDURE — 3008F BODY MASS INDEX DOCD: CPT | Performed by: OBSTETRICS & GYNECOLOGY

## 2022-08-22 LAB — HPV I/H RISK 1 DNA SPEC QL NAA+PROBE: NEGATIVE

## 2022-09-15 ENCOUNTER — TELEPHONE (OUTPATIENT)
Dept: INTERNAL MEDICINE CLINIC | Facility: CLINIC | Age: 62
End: 2022-09-15

## 2022-09-15 ENCOUNTER — PATIENT MESSAGE (OUTPATIENT)
Dept: INTERNAL MEDICINE CLINIC | Facility: CLINIC | Age: 62
End: 2022-09-15

## 2022-09-15 NOTE — TELEPHONE ENCOUNTER
Patient wants a 6-month follow up appointment for January or very beginning of February 2023 with Dr. Marce Gongora. She had one in January however we left her a message Dr Ramos.'s schedule changed, to call us back to reschedule. She would not accept 3-14-22, the next available appt I had to offer her.     Call patient back at: [phone]

## 2022-09-16 NOTE — TELEPHONE ENCOUNTER
From: Adrian Turner  To: Tamiko Santos MD  Sent: 9/15/2022 2:54 PM CDT  Subject: January 10 appointment    Hi Dr. Francisca Santos,  I received a message in my chart that you were canceling my appointment for January 10,2023. I called the office/call center and was unable to get an appointment until the end of March with you. I need an evening appointment and I would appreciate getting an appointment in January or the beginning of February in the evening as I am not the one canceling the appointment. That is when I am due for my follow up with you. Thank you for your help.   Godwin Rubio

## 2022-09-19 ENCOUNTER — TELEPHONE (OUTPATIENT)
Dept: INTERNAL MEDICINE CLINIC | Facility: CLINIC | Age: 62
End: 2022-09-19

## 2022-09-20 ENCOUNTER — MED REC SCAN ONLY (OUTPATIENT)
Dept: INTERNAL MEDICINE CLINIC | Facility: CLINIC | Age: 62
End: 2022-09-20

## 2022-09-22 NOTE — TELEPHONE ENCOUNTER
Please review message I do not have a 530 available there I unless we go all the way into next year.

## 2022-09-27 ENCOUNTER — TELEPHONE (OUTPATIENT)
Dept: INTERNAL MEDICINE CLINIC | Facility: CLINIC | Age: 62
End: 2022-09-27

## 2022-09-27 NOTE — TELEPHONE ENCOUNTER
Please look at the timings. She wants specific times only after 530 which there is only 1 day a week at work after 530.

## 2022-09-27 NOTE — TELEPHONE ENCOUNTER
Patient called stating she is really upset that we can not get an appointment with provider until March 2023. Would like to see what can be done to get a 5:30pm in Jan. 2023 or early Feb. 2023. Would like a call back, No Mychart message.

## 2022-11-03 NOTE — TELEPHONE ENCOUNTER
Adithya Elkins only in office on Tuesdays after 5:30pm Next avail 5/2? Is pt avail on 1/28 Sat. 10a? Or 11am?  Per [address] ok to add once template team opens schedule.

## 2022-11-10 NOTE — TELEPHONE ENCOUNTER
Spoke to pt offered her appt 1/10 at 5pm or 7:30pm pt refused. Franklyn gonzalez 6p appt. Pt already scheduled. Discussed w/ pcp d/t px unable to do 6p. Pt refused other appts will keep March appt. Added to waitlist. Pt agreed.

## 2023-01-03 RX ORDER — ATORVASTATIN CALCIUM 20 MG/1
20 TABLET, FILM COATED ORAL EVERY EVENING
Qty: 90 TABLET | Refills: 1 | Status: SHIPPED | OUTPATIENT
Start: 2023-01-03

## 2023-01-03 NOTE — TELEPHONE ENCOUNTER
Refill passed per [facility] protocol   Requested Prescriptions   Pending Prescriptions Disp Refills    ATORVASTATIN 20 MG Oral Tab [Pharmacy Med Name: ATORVASTATIN 20MG TABLETS] 90 tablet 1     Sig: TAKE 1 TABLET(20 MG) BY MOUTH EVERY EVENING       Cholesterol Medication Protocol Passed - 1/3/2023 10:21 AM        Passed - ALT in past 12 months        Passed - LDL in past 12 months        Passed - Last ALT < 80     Lab Results   Component Value Date    ALT 27 04/22/2022             Passed - Last LDL < 130     Lab Results   Component Value Date    LDL 77 07/09/2022             Passed - In person appointment or virtual visit in the past 12 mos or appointment in next 3 mos     Recent Outpatient Visits              4 months ago Encounter for gynecological examination without abnormal finding    [facility], [address], [place] Nate Crabtree MD    Office Visit    5 months ago [address] [facility], [address], Alba Gonsales MD    Office Visit    6 months ago Hypertension, benign    [facility], Howard Avilez MD    Office Visit    8 months ago Adult general medical exam    [address], [address], Howard Avilez MD    Office Visit    9 months ago Epigastric pain    [address], [address], [place], Jon Shoulder, APRN    Office Visit          Future Appointments         Provider Department Appt Notes    In 2 months Gloria Michaels MD [address], [address], [address]    In 7 months Nate Crabtree MD [facility], [address], [facility]

## 2023-03-21 ENCOUNTER — OFFICE VISIT (OUTPATIENT)
Dept: INTERNAL MEDICINE CLINIC | Facility: CLINIC | Age: 63
End: 2023-03-21

## 2023-03-21 ENCOUNTER — TELEPHONE (OUTPATIENT)
Dept: INTERNAL MEDICINE CLINIC | Facility: CLINIC | Age: 63
End: 2023-03-21

## 2023-03-21 VITALS
HEART RATE: 55 BPM | OXYGEN SATURATION: 96 % | TEMPERATURE: 97 F | DIASTOLIC BLOOD PRESSURE: 80 MMHG | SYSTOLIC BLOOD PRESSURE: 120 MMHG | WEIGHT: 150 LBS | BODY MASS INDEX: 26 KG/M2

## 2023-03-21 DIAGNOSIS — E87.6 HYPOKALEMIA: ICD-10-CM

## 2023-03-21 DIAGNOSIS — I10 HYPERTENSION, BENIGN: Primary | ICD-10-CM

## 2023-03-21 DIAGNOSIS — E78.00 HYPERCHOLESTEROLEMIA: ICD-10-CM

## 2023-03-21 DIAGNOSIS — R73.9 HYPERGLYCEMIA: ICD-10-CM

## 2023-03-21 DIAGNOSIS — Z71.85 VACCINE COUNSELING: ICD-10-CM

## 2023-03-21 DIAGNOSIS — M85.89 OSTEOPENIA OF MULTIPLE SITES: ICD-10-CM

## 2023-03-21 DIAGNOSIS — E53.8 VITAMIN B 12 DEFICIENCY: ICD-10-CM

## 2023-03-21 PROBLEM — N87.0 CERVICAL INTRAEPITHELIAL NEOPLASIA GRADE 1: Status: ACTIVE | Noted: 2023-03-21

## 2023-03-21 PROCEDURE — 3079F DIAST BP 80-89 MM HG: CPT | Performed by: INTERNAL MEDICINE

## 2023-03-21 PROCEDURE — 90471 IMMUNIZATION ADMIN: CPT | Performed by: INTERNAL MEDICINE

## 2023-03-21 PROCEDURE — 99214 OFFICE O/P EST MOD 30 MIN: CPT | Performed by: INTERNAL MEDICINE

## 2023-03-21 PROCEDURE — 3074F SYST BP LT 130 MM HG: CPT | Performed by: INTERNAL MEDICINE

## 2023-03-21 PROCEDURE — 90715 TDAP VACCINE 7 YRS/> IM: CPT | Performed by: INTERNAL MEDICINE

## 2023-03-21 RX ORDER — CLOBETASOL PROPIONATE 0.5 MG/G
1 CREAM TOPICAL 2 TIMES DAILY
Qty: 60 G | Refills: 1 | Status: SHIPPED | OUTPATIENT
Start: 2023-03-21

## 2023-03-21 RX ORDER — ATORVASTATIN CALCIUM 20 MG/1
20 TABLET, FILM COATED ORAL EVERY EVENING
Qty: 90 TABLET | Refills: 1 | Status: SHIPPED | OUTPATIENT
Start: 2023-03-21

## 2023-03-21 RX ORDER — TRIAMTERENE AND HYDROCHLOROTHIAZIDE 37.5; 25 MG/1; MG/1
TABLET ORAL
Qty: 90 TABLET | Refills: 1 | Status: SHIPPED | OUTPATIENT
Start: 2023-03-21

## 2023-03-21 RX ORDER — ATENOLOL 25 MG/1
TABLET ORAL
Qty: 180 TABLET | Refills: 1 | Status: SHIPPED | OUTPATIENT
Start: 2023-03-21

## 2023-03-22 ENCOUNTER — TELEPHONE (OUTPATIENT)
Dept: INTERNAL MEDICINE CLINIC | Facility: CLINIC | Age: 63
End: 2023-03-22

## 2023-03-23 NOTE — TELEPHONE ENCOUNTER
Please inform patient and week 3 of the bone density results. Shows osteopenia which is thinning bones but not to the stage of osteoporosis it was done September of last years. So she does not need to do a bone density this year you will be 2 years so next year she will need to do the bone density in September. By mammogram she is due every year.

## 2023-03-23 NOTE — TELEPHONE ENCOUNTER
Spoke with Francoise Barrientos in [facility] and they will be faxing patients Dexa Scan results to our office, fax number given.

## 2023-03-23 NOTE — TELEPHONE ENCOUNTER
Patient states she has a bone density done at [facility] last year. Can we see if we can find the report?

## 2023-03-23 NOTE — TELEPHONE ENCOUNTER
Pt informed that her bone density scan shows osteopenia which is thinning of bones but not to the stage of osteoporosis it was done September of last year so she will not need dexa scan this year but will be due Sept 2024, mammogram has to be done yearly.

## 2023-03-28 NOTE — TELEPHONE ENCOUNTER
6- 23-23 at 230 PM for physical at [facility].
7 PM on 9-12-23 for physical.
8-15-23 at 630 PM for physical. Later PM's at just one of locations are harder to find. Please have her rely squeezing her in between appointments.
Patient need an evening 6:00 pm in [place]
Patient was advice to return in June for physical can only due Tuesday  Nights after 6;00 pm
Pt contacted, states px done in Aug was from [facility]. Last Px w/ [address] was in April. Disregard schedule change. Will keep Aug 15th.
Spoke to pt, she is ok w/ visit. Dr Nina Arroyo, please advise her last physical was Aug 19th, appt set for Aug 15th is a bit too early.
CXR: No radiographic evidence of acute cardiopulmonary disease.  EKG:  Sinus tachycardia 110 bpm

## 2023-08-08 ENCOUNTER — TELEMEDICINE (OUTPATIENT)
Dept: INTERNAL MEDICINE CLINIC | Facility: CLINIC | Age: 63
End: 2023-08-08

## 2023-08-08 ENCOUNTER — PATIENT MESSAGE (OUTPATIENT)
Dept: INTERNAL MEDICINE CLINIC | Facility: CLINIC | Age: 63
End: 2023-08-08

## 2023-08-08 ENCOUNTER — NURSE TRIAGE (OUTPATIENT)
Dept: INTERNAL MEDICINE CLINIC | Facility: CLINIC | Age: 63
End: 2023-08-08

## 2023-08-08 DIAGNOSIS — E78.00 HYPERCHOLESTEROLEMIA: ICD-10-CM

## 2023-08-08 DIAGNOSIS — U07.1 COVID-19: Primary | ICD-10-CM

## 2023-08-08 DIAGNOSIS — I10 HYPERTENSION, BENIGN: ICD-10-CM

## 2023-08-08 PROCEDURE — 99214 OFFICE O/P EST MOD 30 MIN: CPT | Performed by: INTERNAL MEDICINE

## 2023-08-08 RX ORDER — IBUPROFEN 600 MG/1
600 TABLET ORAL EVERY 6 HOURS PRN
COMMUNITY
Start: 2023-06-23

## 2023-08-08 RX ORDER — AMOXICILLIN 500 MG/1
500 CAPSULE ORAL 3 TIMES DAILY
COMMUNITY
Start: 2023-06-23

## 2023-08-08 NOTE — PROGRESS NOTES
Patient ID: Alyse Rg is a 61year old female. No chief complaint on file. HISTORY OF PRESENT ILLNESS:   Patient presents for above. This visit is conducted using Telemedicine with live, interactive video and audio. Symptoms started on friday  Today is day 5, fatigue, fever, sweats, cough, sore throat, some chest tightness. Review of Systems   Constitutional:  Positive for appetite change, chills, diaphoresis, fatigue and fever. HENT:  Positive for sore throat. Negative for congestion, ear pain, postnasal drip, rhinorrhea and sneezing. Respiratory:  Positive for cough and chest tightness. Negative for shortness of breath and wheezing. Cardiovascular:  Negative for chest pain and palpitations. Gastrointestinal:  Positive for diarrhea. Negative for abdominal pain, nausea and vomiting. Genitourinary:  Negative for dysuria, hematuria and urgency. Musculoskeletal:  Positive for joint swelling and myalgias. Neurological:  Positive for weakness and headaches. Negative for dizziness, syncope, speech difficulty, light-headedness and numbness. MEDICAL HISTORY:     Past Medical History:   Diagnosis Date    Bowen's disease     Hyperthyroidism     Screen for colon cancer     repeat CLN in 10  years    Squamous cell cancer of scalp and skin of neck     Chest. Dr. Franc Li at [place]. S/P Moh's. Past Surgical History:   Procedure Laterality Date          X 2.     DEQUAN BIOPSY STEREO NODULE 1 SITE LEFT (CPT=19081)           Current Outpatient Medications:     amoxicillin 500 MG Oral Cap, Take 1 capsule (500 mg total) by mouth 3 (three) times daily. , Disp: , Rfl:     ibuprofen 600 MG Oral Tab, Take 1 tablet (600 mg total) by mouth every 6 (six) hours as needed. , Disp: , Rfl:     nirmatrelvir-ritonavir 300-100 MG Oral Tablet Therapy Pack, Take two nirmatrelvir tablets (300mg) with one ritonavir tablet (100mg) together twice daily for 5 days. , Disp: 30 tablet, Rfl: 0 atenolol 25 MG Oral Tab, TAKE 1 TABLET BY MOUTH EVERY 12 HOURS, Disp: 180 tablet, Rfl: 1    atorvastatin 20 MG Oral Tab, Take 1 tablet (20 mg total) by mouth every evening., Disp: 90 tablet, Rfl: 1    clobetasol 0.05 % External Cream, Apply 1 each topically 2 (two) times daily. 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     Tobacco Use      Smoking status: Never      Smokeless tobacco: Never    Vaping Use      Vaping Use: Never used    Substance and Sexual Activity      Alcohol use: No      Drug use: No      Sexual activity: Not on file    Other Topics      Concerns:         Service: Not Asked        Blood Transfusions: Not Asked        Caffeine Concern: No        Occupational Exposure: Not Asked        Hobby Hazards: Not Asked        Sleep Concern: Not Asked        Stress Concern: Not Asked        Weight Concern: Not Asked        Special Diet: Not Asked        Back Care: Not Asked        Exercise: Not Asked        Bike Helmet: Not Asked        Seat Belt: Not Asked        Self-Exams: Not Asked    Social History Narrative      Not on file    Social Determinants of Health  Financial Resource Strain: Not on file  Food Insecurity: Not on file  Transportation Needs: Not on file  Physical Activity: Not on file  Stress: Not on file  Social Connections: Not on file  Housing Stability: Not on file    PHYSICAL EXAM:   Unable to perform vitals or do physical exam as this is a virtual video visit. Patient appears alert. No conversational dyspnea or distress. ASSESSMENT/PLAN:   1. COVID-19  2. Hypertension, benign  3. Hypercholesterolemia  -patient with exposure to COVID, tested positive 5 days ago  -patient is having moderate symptoms with generalized fatigue, cough, chills, cough and some tightness in the chest, no CP no SOB  -Discussed risks-benefits for Antiviral therapy  -las set of labs 2022, patient has order for labs and will try to get them done  -COVID vaccinated, did not take the 4th booster  Plan:  - nirmatrelvir-ritonavir 300-100 MG Oral Tablet Therapy Pack; Take two nirmatrelvir tablets (300mg) with one ritonavir tablet (100mg) together twice daily for 5 days. Dispense: 30 tablet; Refill: 0  -STOP atorvastatin for the duration of the therapy and resume about a week after finishing Paxlovid  -if worsening of symptoms patient should go to the ER    Return if symptoms worsen or fail to improve. Time spent on encounter  20 minutes   Video time 10 minutes   Documentation time 10 minutes     Flores Anthony understands video evaluation is not a substitute for face-to-face examination or emergency care. Patient advised to go to ER or call 911 for worsening symptoms or acute distress. Telehealth outside of [address] Verbal Consent   I conducted a telehealth visit with Flores Anthony today, 08/08/23, which was completed using two-way, real-time interactive audio and video communication. This has been done in good chele to provide continuity of care in the best interest of the provider-patient relationship, due to the COVID -19 public health crisis/national emergency where restrictions of face-to-face office visits are ongoing. Every conscious effort was taken to allow for sufficient and adequate time to complete the visit. The patient was made aware of the limitations of the telehealth visit, including treatment limitations as no physical exam could be performed.   The patient was advised to call 911 or to go to the ER in case there was an emergency. The patient was also advised of the potential privacy & security concerns related to the telehealth platform. The patient was made aware of where to find [facility] notice of privacy practices, telehealth consent form and other related consent forms and documents. which are located on the [facility] website. The patient verbally agreed to telehealth consent form, related consents and the risks discussed. Lastly, the patient confirmed that they were in [place]. Included in this visit, time may have been spent reviewing labs, medications, radiology tests and decision making. Appropriate medical decision-making and tests are ordered as detailed in the plan of care above. Coding/billing information is submitted for this visit based on complexity of care and/or time spent for the visit. This note was prepared using [employer] voice recognition dictation software. As a result errors may occur. When identified these errors have been corrected.  While every attempt is made to correct errors during dictation discrepancies may still exist.    Francine Reid MD  8/8/2023

## 2023-08-08 NOTE — TELEPHONE ENCOUNTER
Please reply to pool: EM RN TRIAGE  Action Requested: Summary for Provider     []  Critical Lab, Recommendations Needed  [] Need Additional Advice  [x]   FYI    []   Need Orders  [] Need Medications Sent to Pharmacy  []  Other     SUMMARY: Patient contacts clinic to follow up on positive covid status. Today is day 5 of symptoms. Body aches, chills, productive cough, fatigue. Denies chest pain or shortness of breath. Acute virtual visit booked to day to discuss paxlovid.     Reason for call: Acute  Onset: Data Unavailable                       Reason for Disposition   [1] COVID-19 diagnosed by positive lab test (e.g., PCR, rapid self-test kit) AND [2] mild symptoms (e.g., cough, fever, others) AND [0] no complications or SOB    Protocols used: Coronavirus (COVID-19) Diagnosed or [id number]

## 2023-08-09 LAB
CHOL/HDLC RATIO: 3.5 (CALC)
CHOLESTEROL, TOTAL: 155 MG/DL
HDL CHOLESTEROL: 44 MG/DL
HEMOGLOBIN A1C: 5.4 % OF TOTAL HGB
LDL-CHOLESTEROL: 87 MG/DL (CALC)
NON-HDL CHOLESTEROL: 111 MG/DL (CALC)
TRIGLYCERIDES: 142 MG/DL
VITAMIN B12: 593 PG/ML (ref 200–1100)
VITAMIN D, 25-OH, TOTAL: 35 NG/ML (ref 30–100)

## 2023-08-09 NOTE — PROGRESS NOTES
Lipid (choilesterol) LDL is good. However HDL is lower than prior. goal HDL should be above 50. Can increase HDL with sustained cardiovascular aerobic exercise at least 30 minutes 3-4 times a week. Recheck lipid in 3 months to 6 months. Vitamin B12 is okay. Vitamin D level is okay. Continue the same. Hemoglobin A1c which is a 3-month average of sugars looks good. Goal is 6.5 or less.

## 2023-08-09 NOTE — TELEPHONE ENCOUNTER
From: Monica Betancourt  To: Morena Farah. Marce Gongora MD  Sent: 8/8/2023 2:10 PM CDT  Subject: Added [facility]    I am having labs done at [email or web address] in [place] at 3:10pm. if you want to fax over any added labs that you want me to have here is the information.   The phone number there is :  [phone]  Address:  [address] [place] [address] Alejandrina Vazquez  Thank you,  Alfredo Craig

## 2023-08-15 ENCOUNTER — OFFICE VISIT (OUTPATIENT)
Dept: INTERNAL MEDICINE CLINIC | Facility: CLINIC | Age: 63
End: 2023-08-15

## 2023-08-15 VITALS
TEMPERATURE: 98 F | SYSTOLIC BLOOD PRESSURE: 136 MMHG | OXYGEN SATURATION: 99 % | WEIGHT: 147.19 LBS | HEART RATE: 57 BPM | BODY MASS INDEX: 25.76 KG/M2 | HEIGHT: 63.5 IN | RESPIRATION RATE: 16 BRPM | DIASTOLIC BLOOD PRESSURE: 81 MMHG

## 2023-08-15 DIAGNOSIS — R07.9 CHEST PAIN, UNSPECIFIED TYPE: ICD-10-CM

## 2023-08-15 DIAGNOSIS — K59.00 CONSTIPATION, UNSPECIFIED CONSTIPATION TYPE: ICD-10-CM

## 2023-08-15 DIAGNOSIS — E87.6 HYPOKALEMIA: ICD-10-CM

## 2023-08-15 DIAGNOSIS — Z00.00 ADULT GENERAL MEDICAL EXAM: ICD-10-CM

## 2023-08-15 DIAGNOSIS — Z12.11 COLON CANCER SCREENING: ICD-10-CM

## 2023-08-15 DIAGNOSIS — M85.89 OSTEOPENIA OF MULTIPLE SITES: ICD-10-CM

## 2023-08-15 DIAGNOSIS — H35.342 MACULAR HOLE OF LEFT EYE: ICD-10-CM

## 2023-08-15 DIAGNOSIS — Z12.4 PAP SMEAR FOR CERVICAL CANCER SCREENING: ICD-10-CM

## 2023-08-15 DIAGNOSIS — N95.2 ATROPHIC VAGINITIS: ICD-10-CM

## 2023-08-15 DIAGNOSIS — E78.00 HYPERCHOLESTEROLEMIA: ICD-10-CM

## 2023-08-15 DIAGNOSIS — R00.2 PALPITATIONS: ICD-10-CM

## 2023-08-15 DIAGNOSIS — Z12.31 ENCOUNTER FOR SCREENING MAMMOGRAM FOR MALIGNANT NEOPLASM OF BREAST: ICD-10-CM

## 2023-08-15 DIAGNOSIS — R73.9 HYPERGLYCEMIA: ICD-10-CM

## 2023-08-15 DIAGNOSIS — E53.8 VITAMIN B 12 DEFICIENCY: ICD-10-CM

## 2023-08-15 DIAGNOSIS — H25.13 NUCLEAR SCLEROSIS OF BOTH EYES: ICD-10-CM

## 2023-08-15 DIAGNOSIS — Z71.85 VACCINE COUNSELING: ICD-10-CM

## 2023-08-15 DIAGNOSIS — K21.9 GASTROESOPHAGEAL REFLUX DISEASE WITHOUT ESOPHAGITIS: ICD-10-CM

## 2023-08-15 DIAGNOSIS — I10 HYPERTENSION, BENIGN: Primary | ICD-10-CM

## 2023-08-15 PROBLEM — C44.42 SQUAMOUS CELL CANCER OF SCALP AND SKIN OF NECK: Status: RESOLVED | Noted: 2018-09-03 | Resolved: 2023-08-15

## 2023-08-15 PROCEDURE — 3075F SYST BP GE 130 - 139MM HG: CPT | Performed by: INTERNAL MEDICINE

## 2023-08-15 PROCEDURE — 99396 PREV VISIT EST AGE 40-64: CPT | Performed by: INTERNAL MEDICINE

## 2023-08-15 PROCEDURE — 3079F DIAST BP 80-89 MM HG: CPT | Performed by: INTERNAL MEDICINE

## 2023-08-15 PROCEDURE — 99214 OFFICE O/P EST MOD 30 MIN: CPT | Performed by: INTERNAL MEDICINE

## 2023-08-15 PROCEDURE — 3008F BODY MASS INDEX DOCD: CPT | Performed by: INTERNAL MEDICINE

## 2023-08-17 RX ORDER — TRIAMTERENE AND HYDROCHLOROTHIAZIDE 37.5; 25 MG/1; MG/1
TABLET ORAL
Qty: 90 TABLET | Refills: 1 | Status: SHIPPED | OUTPATIENT
Start: 2023-08-17

## 2023-08-17 RX ORDER — ATORVASTATIN CALCIUM 20 MG/1
20 TABLET, FILM COATED ORAL EVERY EVENING
Qty: 90 TABLET | Refills: 1 | Status: SHIPPED | OUTPATIENT
Start: 2023-08-17

## 2023-08-17 RX ORDER — ATENOLOL 25 MG/1
TABLET ORAL
Qty: 180 TABLET | Refills: 1 | Status: SHIPPED | OUTPATIENT
Start: 2023-08-17

## 2023-08-17 NOTE — TELEPHONE ENCOUNTER
CMP out of date range for protocol. Not collected with most recent labs. Please advise on refill request.    Requested Prescriptions     Pending Prescriptions Disp Refills    atenolol 25 MG Oral Tab 180 tablet 1     Sig: TAKE 1 TABLET BY MOUTH EVERY 12 HOURS    atorvastatin 20 MG Oral Tab 90 tablet 1     Sig: Take 1 tablet (20 mg total) by mouth every evening. Triamterene-HCTZ 37.5-25 MG Oral Tab 90 tablet 1     Sig: TAKE 1/4 TABLET BY MOUTH EVERY MORNING      Recent Visits  Date Type Provider Dept   08/15/23 Office Visit Neris Quinones., MD [id number] Med   03/21/23 Office Visit Neris Quinones., MD [id number]-Hvtmkycx Med   07/06/22 Office Visit Neris Quinones.MD GoodenVuizq136-Icnaxkgt Med   04/19/22 Office Visit Neris Quinones., MD [id number]-Pkhklwdp Med   03/15/22 Office Visit Neris Quinones., MD [id number] Med   Showing recent visits within past 540 days with a meds authorizing provider and meeting all other requirements  Future Appointments  No visits were found meeting these conditions. Showing future appointments within next 150 days with a meds authorizing provider and meeting all other requirements     Requested Prescriptions   Pending Prescriptions Disp Refills    atenolol 25 MG Oral Tab 180 tablet 1     Sig: TAKE 1 TABLET BY MOUTH EVERY 12 HOURS       Hypertensive Medications Protocol Failed - 8/16/2023  6:07 PM        Failed - CMP or BMP in past 6 months     No results found for this or any previous visit (from the past 4392 hour(s)).             Failed - EGFRCR or GFRNAA > 50     GFR Evaluation            Passed - In person appointment in the past 12 or next 3 months     Recent Outpatient Visits              2 days ago Hypertension, benign    [facility], Lew Tirado MD    Office Visit    1 week ago COVID-19    Randine Bamberger, MD    Telemedicine    4 months ago Hypertension, benign    Bethle Plummer, [address], Rozelle Furnace., MD    Office Visit    12 months ago Encounter for gynecological examination without abnormal finding    [address] Valerio Clark MD    Office Visit    1 year ago Henrry Mack, [address], Shivani Mccoy MD    Office Visit          Future Appointments         Provider Department Appt Notes    In 1 week MD Bethel Cortez, [facility] Annual     In 6 months MD Bethel Valenzuela, [address], [place] F/U, policy informed               Passed - Last BP reading less than 140/90     BP Readings from Last 1 Encounters:  08/15/23 : 136/81              Passed - In person appointment or virtual visit in the past 6 months     Recent Outpatient Visits              2 days ago Hypertension, benign    Bethel Plummer, [address], Rozelle Furnace., MD    Office Visit    1 week ago COVID-19    Joel Armstrong, [place] Zoë Azul MD    Telemedicine    4 months ago Hypertension, benign    Bethel Plummer, [address], Rozelle Furnace., MD    Office Visit    12 months ago Encounter for gynecological examination without abnormal finding    [address] Valerio Clark MD    Office Visit    1 year ago Alek Angulo, Shivani Mccoy MD    Office Visit          Future Appointments         Provider Department Appt Notes    In 1 week MD Bethel Cortez, [address], [facility] Annual     In 6 months Francis Rosales MD [facility], [address], [place] F/U, policy informed                 atorvastatin 20 MG Oral Tab 90 tablet 1     Sig: Take 1 tablet (20 mg total) by mouth every evening. Cholesterol Medication Protocol Failed - 8/16/2023  6:07 PM        Failed - ALT in past 12 months        Failed - Last ALT < 80     Lab Results   Component Value Date    ALT 27 04/22/2022             Passed - LDL in past 12 months        Passed - Last LDL < 130     Lab Results   Component Value Date    LDL 87 08/08/2023             Passed - In person appointment or virtual visit in the past 12 mos or appointment in next 3 mos     Recent Outpatient Visits              2 days ago Hypertension, benign    Madhavi Tompkins, [address], Arthur Smith MD    Office Visit    1 week ago COVID-19    Astrid Madden, Paulette Nava MD    Telemedicine    4 months ago Hypertension, benign    Madhavi Tompkins, [address], Arthur Smith MD    Office Visit    12 months ago Encounter for gynecological examination without abnormal finding    [address] Keke Murguia MD    Office Visit    1 year ago Valentina Betancourt, Maciej Iraheta MD    Office Visit          Future Appointments         Provider Department Appt Notes    In 1 week MD Madhavi Ken Slot, [address], [address] [facility] Annual     In 6 months Efe Buckley.MD Madhavi, [address], [place] F/U, policy informed                 Triamterene-HCTZ 37.5-25 MG Oral Tab 90 tablet 1     Sig: TAKE 1/4 TABLET BY MOUTH EVERY MORNING       Hypertensive Medications Protocol Failed - 8/16/2023  6:07 PM        Failed - CMP or BMP in past 6 months     No results found for this or any previous visit (from the past 4392 hour(s)).             Failed - EGFRCR or GFRNAA > 50     GFR Evaluation            Passed - In person appointment in the past 12 or next 3 months     Recent Outpatient Visits 2 days ago Hypertension, benign    Clydie Guild, [address], Lona Cook., MD    Office Visit    1 week ago COVID-19    Paula Madera MD    Telemedicine    4 months ago Hypertension, benign    Clydie Guild, [address], Lona Cook., MD    Office Visit    12 months ago Encounter for gynecological examination without abnormal finding    [address] Kiran Bedolla MD    Office Visit    1 year ago Edgar Tong, [address], Tera Grant MD    Office Visit          Future Appointments         Provider Department Appt Notes    In 1 week MD Libia Garcia, [address] [facility] Annual     In 6 months MD Libia Lambert, [address], [place] F/U, policy informed               Passed - Last BP reading less than 140/90     BP Readings from Last 1 Encounters:  08/15/23 : 136/81              Passed - In person appointment or virtual visit in the past 6 months     Recent Outpatient Visits              2 days ago Hypertension, benign    Libia Dow, [address], Lona Arreola, MD    Office Visit    1 week ago COVID-19    Opal Macedo, Morralaugusto Paulino MD    Telemedicine    4 months ago Hypertension, benign    Clydie Jacquesd, [address], Lona Arreola, MD    Office Visit    12 months ago Encounter for gynecological examination without abnormal finding    Elliot Bedolla MD    Office Visit    1 year ago Alek Pedro, Tera Grant MD    Office Visit          Future Appointments         Provider Department Appt Notes In 1 week Ursula Card MD [address], [address] Annual     In 6 months Иван Hughes MD [address], [address], [place] F/U, policy informed                   Future Appointments         Provider Department Appt Notes    In 1 week Ursula Card MD [address], [address] Annual     In 6 months Иван Hughes MD [facility] F/U, policy informed           Recent Outpatient Visits              2 days ago Hypertension, benign    [address], [address], J Carlos Gee MD    Office Visit    1 week ago [facility] [place] [address], [place] Philip Dodge MD    [facility]    4 months ago Hypertension, benign    [address], [address], J Carlos Gee MD    Office Visit    12 months ago Encounter for gynecological examination without abnormal finding    [address] Ursula Card MD    Office Visit    1 year ago Chastity Mane MD    Office Visit

## 2023-08-24 RX ORDER — TRIAMTERENE AND HYDROCHLOROTHIAZIDE 37.5; 25 MG/1; MG/1
TABLET ORAL
Qty: 90 TABLET | Refills: 1 | OUTPATIENT
Start: 2023-08-24

## 2023-08-25 ENCOUNTER — OFFICE VISIT (OUTPATIENT)
Dept: OBGYN CLINIC | Facility: CLINIC | Age: 63
End: 2023-08-25

## 2023-08-25 VITALS
SYSTOLIC BLOOD PRESSURE: 138 MMHG | BODY MASS INDEX: 26 KG/M2 | DIASTOLIC BLOOD PRESSURE: 81 MMHG | WEIGHT: 146.63 LBS | HEART RATE: 57 BPM

## 2023-08-25 DIAGNOSIS — Z01.419 ENCOUNTER FOR GYNECOLOGICAL EXAMINATION WITHOUT ABNORMAL FINDING: Primary | ICD-10-CM

## 2023-08-25 PROCEDURE — 3075F SYST BP GE 130 - 139MM HG: CPT | Performed by: OBSTETRICS & GYNECOLOGY

## 2023-08-25 PROCEDURE — 99396 PREV VISIT EST AGE 40-64: CPT | Performed by: OBSTETRICS & GYNECOLOGY

## 2023-08-25 PROCEDURE — 3079F DIAST BP 80-89 MM HG: CPT | Performed by: OBSTETRICS & GYNECOLOGY

## 2023-08-25 NOTE — PROGRESS NOTES
Lincoln Cooley is a 61year old female  No LMP recorded. Patient is postmenopausal. who presents for Patient presents with:  Gyn Exam: Annual     She has no gyne complaints. Pt is being worked up for GI issues- s/p endoscopy and colonoscopy. Pcp ordered mammogram and it is scheduled for 2023. OBSTETRICS HISTORY:  OB History     T2    L2    SAB0  IAB0  Ectopic0  Multiple0  Live Births2     GYNE HISTORY:        MEDICAL HISTORY:  Past Medical History:   Diagnosis Date    Bowen's disease     Hyperthyroidism     Screen for colon cancer     repeat CLN in 10  years    Squamous cell cancer of scalp and skin of neck     Chest. Dr. Franchesca Vaca at [facility]. S/P Moh's. SURGICAL HISTORY:  Past Surgical History:   Procedure Laterality Date          X 2.     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     Tobacco Use      Smoking status: Never      Smokeless tobacco: Never    Vaping Use      Vaping Use: Never used    Substance and Sexual Activity      Alcohol use: No      Drug use: No      Sexual activity: Not Currently    Other Topics      Concerns:        Caffeine Concern: No        FAMILY HISTORY:  Family History   Adopted: Yes   Problem Relation Age of Onset    Heart Disorder Father     Lipids Paternal Aunt     Heart Disorder Paternal Aunt     Lipids Paternal Aunt     Heart Disorder Paternal Aunt        MEDICATIONS:    Current Outpatient Medications:     atenolol 25 MG Oral Tab, TAKE 1 TABLET BY MOUTH EVERY 12 HOURS, Disp: 180 tablet, Rfl: 1    atorvastatin 20 MG Oral Tab, Take 1 tablet (20 mg total) by mouth every evening., Disp: 90 tablet, Rfl: 1    Triamterene-HCTZ 37.5-25 MG Oral Tab, TAKE 1/4 TABLET BY MOUTH EVERY MORNING, Disp: 90 tablet, Rfl: 1 clobetasol 0.05 % External Cream, Apply 1 each topically 2 (two) times daily. , Disp: 60 g, Rfl: 1    amoxicillin 500 MG Oral Cap, Take 1 capsule (500 mg total) by mouth 3 (three) times daily. , Disp: , Rfl:     ibuprofen 600 MG Oral Tab, Take 1 tablet (600 mg total) by mouth every 6 (six) hours as needed. , Disp: , Rfl:     ALLERGIES:    Kiwi Extract              Latex                   ITCHING, RASH  Watermelon                    Review of Systems:  Constitutional:  Denies fatigue, night sweats, hot flashes  Eyes:  denies blurred or double vision  Cardiovascular:  denies chest pain or palpitations  Respiratory:  denies shortness of breath  Gastrointestinal:  denies heartburn, abdominal pain, diarrhea or constipation  Genitourinary:  denies dysuria, incontinence, abnormal vaginal discharge, vaginal itching  Musculoskeletal:  denies back pain. Skin/Breast:  Denies any breast pain, lumps, or discharge. Neurological:  denies headaches, extremity weakness or numbness. Psychiatric: denies depression or anxiety. Endocrine:   denies excessive thirst or urination. Heme/Lymph:  denies history of anemia, easy bruising or bleeding. Depression Screening (PHQ-2/PHQ-9): Over the LAST 2 WEEKS   Little interest or pleasure in doing things (over the last two weeks)?: Not at all    Feeling down, depressed, or hopeless (over the last two weeks)?: Not at all    PHQ-2 SCORE: 0         Blood pressure 138/81, pulse 57, weight 146 lb 9.6 oz (66.5 kg), not currently breastfeeding.     PHYSICAL EXAM:   Constitutional: well developed, well nourished  Head/Face: normocephalic  Neck/Thyroid: thyroid symmetric, no thyromegaly, no nodules, no adenopathy  Lymphatic:no abnormal supraclavicular or axillary adenopathy is noted  Breast: normal without palpable masses, tenderness, asymmetry, nipple discharge, nipple retraction or skin changes  Respiratory:  lungs clear to auscultation bilaterally  Cardiovascular: regular rate and rhythm, no significant murmur  Abdomen:  soft, nontender, nondistended, no masses  Skin/Hair: no unusual rashes or bruises  Extremities: no edema, no cyanosis  Psychiatric:  Oriented to time, place, person and situation. Appropriate mood and affect    Pelvic Exam:  External Genitalia: normal appearance, hair distribution, and no lesions  Urethral Meatus:  normal in size, location, without lesions and prolapse  Bladder:  No fullness, masses or tenderness  Vagina:  Normal appearance without lesions, no abnormal discharge  Cervix:  Normal without tenderness on motion  Uterus: normal in size, contour, position, mobility, without tenderness  Adnexa: normal without masses or tenderness  Perineum: normal  Rectovaginal: no masses, normal tone  Anus: no thrombosed or ulcerated hemorroids    Assessment & Plan:   ASCCP guidelines discussed,cotest done,mammogram ordered,rtc 1 year for annual exam   SBE encouraged  Encounter for gynecological examination without abnormal finding  (primary encounter diagnosis)  No orders of the defined types were placed in this encounter.     Requested Prescriptions      No prescriptions requested or ordered in this encounter     None

## 2023-09-08 ENCOUNTER — PATIENT MESSAGE (OUTPATIENT)
Dept: INTERNAL MEDICINE CLINIC | Facility: CLINIC | Age: 63
End: 2023-09-08

## 2023-09-09 ENCOUNTER — PATIENT MESSAGE (OUTPATIENT)
Dept: INTERNAL MEDICINE CLINIC | Facility: CLINIC | Age: 63
End: 2023-09-09

## 2023-09-09 DIAGNOSIS — R07.9 CHEST PAIN, UNSPECIFIED TYPE: Primary | ICD-10-CM

## 2023-09-18 LAB
IMMUNOGLOBULIN A: 136 MG/DL (ref 70–320)
TISSUE TRANSGLUTAMINASE$ANTIBODY, IGA: <1 U/ML

## 2023-10-16 ENCOUNTER — MED REC SCAN ONLY (OUTPATIENT)
Dept: INTERNAL MEDICINE CLINIC | Facility: CLINIC | Age: 63
End: 2023-10-16

## 2023-10-16 ENCOUNTER — TELEPHONE (OUTPATIENT)
Dept: INTERNAL MEDICINE CLINIC | Facility: CLINIC | Age: 63
End: 2023-10-16

## 2023-10-16 NOTE — TELEPHONE ENCOUNTER
Patient informed that her mammogram is normal and continue self breast exam every month and to repeat test in one year.

## 2023-11-22 ENCOUNTER — PATIENT MESSAGE (OUTPATIENT)
Dept: OBGYN CLINIC | Facility: CLINIC | Age: 63
End: 2023-11-22

## 2023-11-22 ENCOUNTER — PATIENT MESSAGE (OUTPATIENT)
Dept: INTERNAL MEDICINE CLINIC | Facility: CLINIC | Age: 63
End: 2023-11-22

## 2023-11-24 RX ORDER — ACYCLOVIR 400 MG/1
400 TABLET ORAL 3 TIMES DAILY
Qty: 15 TABLET | Refills: 5 | Status: SHIPPED | OUTPATIENT
Start: 2023-11-24 | End: 2023-11-29

## 2023-11-30 NOTE — TELEPHONE ENCOUNTER
Current Outpatient Prescriptions:       •  PRAVASTATIN SODIUM 40 MG Oral Tab, TAKE 1 TABLET BY MOUTH EVERY DAY, Disp: 90 tablet, Rfl: 0  REFILL no

## 2023-12-12 ENCOUNTER — OFFICE VISIT (OUTPATIENT)
Dept: OBGYN CLINIC | Facility: CLINIC | Age: 63
End: 2023-12-12
Payer: COMMERCIAL

## 2023-12-12 VITALS — SYSTOLIC BLOOD PRESSURE: 112 MMHG | HEART RATE: 57 BPM | DIASTOLIC BLOOD PRESSURE: 71 MMHG

## 2023-12-12 DIAGNOSIS — N90.4 LICHEN SCLEROSUS OF FEMALE GENITALIA: Primary | ICD-10-CM

## 2023-12-12 PROCEDURE — 99213 OFFICE O/P EST LOW 20 MIN: CPT | Performed by: NURSE PRACTITIONER

## 2023-12-12 PROCEDURE — 3078F DIAST BP <80 MM HG: CPT | Performed by: NURSE PRACTITIONER

## 2023-12-12 PROCEDURE — 3074F SYST BP LT 130 MM HG: CPT | Performed by: NURSE PRACTITIONER

## 2023-12-12 RX ORDER — VALACYCLOVIR HYDROCHLORIDE 500 MG/1
500 TABLET, FILM COATED ORAL 2 TIMES DAILY
Qty: 30 TABLET | Refills: 2 | Status: SHIPPED | OUTPATIENT
Start: 2023-12-12

## 2023-12-12 RX ORDER — CLOBETASOL PROPIONATE 0.5 MG/G
1 CREAM TOPICAL AS DIRECTED
Qty: 60 G | Refills: 1 | Status: SHIPPED | OUTPATIENT
Start: 2023-12-12

## 2023-12-12 NOTE — PROGRESS NOTES
[facility], [facility]   Obstetrics and Gynecology    Kya Quach is a 61year old female  No LMP recorded. Patient is postmenopausal.   Chief Complaint   Patient presents with    Gyn Problem     VAGINAL BURNING / Reola Kava   . Last seen 2023 with CAP. States irritation near rectum starting 8 weeks ago. She changed to a vagisil soap to see if improved symptoms, did not. Hx of HSV and she tried this end of November. Her 40year old daughter passed around that time from breast cancer/PE. Hx of lichen sclerosis - tried triamcinolone. No vaginal symptoms, no urinary symptoms, no abnormal discharge. LMP age 50. Pap:2022 NILM, neg HPV      OBSTETRICS HISTORY:  OB History    Para Term  AB Living   2 2 2 0 0 2   SAB IAB Ectopic Multiple Live Births   0 0 0 0 2       GYNE HISTORY:  Period Cycle (Days): Postemenopausal (2023 10:08 AM)  Use of Birth Control (if yes, specify type): Postmenopausal (2023 10:08 AM)  Pap Date: 22 (2023 10:08 AM)  Pap Result Notes: Neg Pap/HPV // Mammo 21 ZHANG Neg (2023 10:08 AM)  Follow Up Recommendation: Last annual 22 CAP (2023 10:08 AM)      History   Sexual Activity    Sexual activity: Not Currently       MEDICAL HISTORY:  Past Medical History:   Diagnosis Date    Bowen's disease     Hyperthyroidism     Screen for colon cancer     repeat CLN in 10  years    Squamous cell cancer of scalp and skin of neck     Chest. Dr. Aguirre Daughters at [facility]. S/P Moh's. 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    Tobacco Use    Smoking status: Never    Smokeless tobacco: Never   Vaping Use    Vaping Use: Never used Substance and Sexual Activity    Alcohol use: No    Drug use: No    Sexual activity: Not Currently   Other Topics Concern     Service Not Asked    Blood Transfusions Not Asked    Caffeine Concern No    Occupational Exposure Not Asked    Hobby Hazards Not Asked    Sleep Concern Not Asked    Stress Concern Not Asked    Weight Concern Not Asked    Special Diet Not Asked    Back Care Not Asked    Exercise Not Asked    Bike Helmet Not Asked    Seat Belt Not Asked    Self-Exams Not Asked   Social History Narrative    Not on file     Social Determinants of Health     Financial Resource Strain: Not on file   Food Insecurity: Not on file   Transportation Needs: Not on file   Physical Activity: Not on file   Stress: Not on file   Social Connections: Not on file   Housing Stability: Not on file       MEDICATIONS:    Current Outpatient Medications:     clobetasol 0.05 % External Cream, Apply 1 each topically As Directed. Apply thin layer nightly for 1 month, then twice weekly for one month, Disp: 60 g, Rfl: 1    valACYclovir (VALTREX) 500 MG Oral Tab, Take 1 tablet (500 mg total) by mouth 2 (two) times daily.  For 3-5 days as needed, Disp: 30 tablet, Rfl: 2    atenolol 25 MG Oral Tab, TAKE 1 TABLET BY MOUTH EVERY 12 HOURS, Disp: 180 tablet, Rfl: 1    atorvastatin 20 MG Oral Tab, Take 1 tablet (20 mg total) by mouth every evening., Disp: 90 tablet, Rfl: 1    Triamterene-HCTZ 37.5-25 MG Oral Tab, TAKE 1/4 TABLET BY MOUTH EVERY MORNING, Disp: 90 tablet, Rfl: 1    ALLERGIES:    Allergies   Allergen Reactions    Kiwi Extract     Latex ITCHING and RASH    Watermelon          Review of Systems:  Constitutional:  Denies fatigue, night sweats, hot flashes  Cardiovascular:  denies chest pain or palpitations  Respiratory:  denies shortness of breath  Gastrointestinal:  denies heartburn, abdominal pain, diarrhea or constipation  Genitourinary:  denies dysuria, incontinence, abnormal vaginal discharge, vaginal itching, +burning near rectum  Musculoskeletal:  denies back pain. Skin/Breast:  Denies any breast pain, lumps, or discharge. Neurological:  denies headaches, extremity weakness or numbness. Psychiatric: denies depression or anxiety. Endocrine:   denies excessive thirst or urination. Heme/Lymph:  denies history of anemia, easy bruising or bleeding. PHYSICAL EXAM:     Vitals:    12/12/23 1011   BP: 112/71   Pulse: 57     There is no height or weight on file to calculate BMI. Constitutional: well developed, well nourished    Psychiatric:  Oriented to time, place, person and situation. Appropriate mood and affect    Pelvic Exam:  External Genitalia: bilateral lower perineal area and rectum with some redness and slight hypopigmentation, no lesions  Urethral Meatus:  normal in size, location, without lesions and prolapse  No hemorrhoids  Lymph node: no inguinal lymph nodes    Assessment & Plan:  Lorenzo Sharma was seen today for gyn problem. Diagnoses and all orders for this visit:    Lichen sclerosus of female genitalia    Other orders  -     clobetasol 0.05 % External Cream; Apply 1 each topically As Directed. Apply thin layer nightly for 1 month, then twice weekly for one month  -     valACYclovir (VALTREX) 500 MG Oral Tab; Take 1 tablet (500 mg total) by mouth 2 (two) times daily. For 3-5 days as needed      Likely flare of lichen sclerosus  Clobetasol nightly for 4 weeks then 1-2 times weekly for 4 weeks  Follow up in 6-8 weeks if not improved or if worsens. Sitz baths and vaseline flipn    MARK Tesfaye    This note was prepared using [employer] [facility] [employer] voice recognition dictation software. As a result errors may occur. When identified these errors have been corrected.  While every attempt is made to correct errors during dictation discrepancies may still exist.

## 2023-12-26 RX ORDER — ATENOLOL 25 MG/1
TABLET ORAL
Qty: 180 TABLET | Refills: 1 | OUTPATIENT
Start: 2023-12-26

## 2023-12-27 NOTE — TELEPHONE ENCOUNTER
Please review. Protocol failed / No Protocol.    Requested Prescriptions   Pending Prescriptions Disp Refills    ATENOLOL 25 MG Oral Tab [Pharmacy Med Name: ATENOLOL 25MG TABLETS] 180 tablet 1     Sig: TAKE 1 TABLET BY MOUTH EVERY 12 HOURS       Hypertensive Medications Protocol Failed - 12/25/2023  3:33 AM        Failed - CMP or BMP in past 6 months     No results found for this or any previous visit (from the past 4392 hour(s)).            Failed - EGFRCR or GFRNAA > 50     GFR Evaluation            Passed - In person appointment in the past 12 or next 3 months     Recent Outpatient Visits              2 weeks ago Lichen sclerosus of female genitalia    [facility] Jacqueline Anna APRN    Office Visit    4 months ago Encounter for gynecological examination without abnormal finding    [facility] Marychuy Nava MD    Office Visit    4 months ago Hypertension, benign    [facility] Destini Ramos MD    Office Visit    4 months ago COVID-19    [facility] Hedy Atkinson MD    Telemedicine    9 months ago Hypertension, benign    [facility] Destini Ramos MD    Office Visit          Future Appointments         Provider Department Appt Notes    In 2 months Destini Ramos MD [facility] F/U, policy informed    In 8 months Maryhcuy Nava MD [facility] annual               Passed - Last BP reading less than 140/90     BP Readings from Last 1 Encounters:   12/12/23 112/71               Passed - In person appointment or virtual visit in the past 6 months     Recent Outpatient Visits              2 weeks ago Lichen sclerosus of female genitalia    [facility]  [place] - OB/GYN Jacqueline Anna APRN    Office Visit    4 months ago Encounter for gynecological examination without abnormal finding    [facility] - OB/GYN Marychuy Nava MD    Office Visit    4 months ago Hypertension, benign    [facility] Destini Ramos MD    Office Visit    4 months ago COVID-19    [facility] Hedy Atkinson MD    Telemedicine    9 months ago Hypertension, benign    [facility] Destini Ramos MD    Office Visit          Future Appointments         Provider Department Appt Notes    In 2 months Destini Ramos MD [facility] F/U, policy informed    In 8 months Marychuy Nava MD [facility] - OB/GYN annual

## 2024-01-10 ENCOUNTER — PATIENT MESSAGE (OUTPATIENT)
Dept: INTERNAL MEDICINE CLINIC | Facility: CLINIC | Age: 64
End: 2024-01-10

## 2024-01-10 NOTE — TELEPHONE ENCOUNTER
From: Elena Rea  To: Destini Ramos  Sent: 1/10/2024 8:03 AM CST  Subject: Ultrasound of the legs/ order    Good morning Dr. Ramos-  Happy New Year!  I’ve been having leg pain/ tightness/ soreness/ tingling for a while. I’d like to have an ultrasound of my legs to rule out blood clots. I understand they are a symptom after having Covid which I had in August.  Can you please send an order to [facility] in [facility] for me?  Thank you,  Elena

## 2024-01-12 ENCOUNTER — NURSE TRIAGE (OUTPATIENT)
Dept: INTERNAL MEDICINE CLINIC | Facility: CLINIC | Age: 64
End: 2024-01-12

## 2024-01-12 NOTE — TELEPHONE ENCOUNTER
Action Requested: Summary for Provider     []  Critical Lab, Recommendations Needed  [x] Need Additional Advice  []   FYI    []   Need Orders  [] Need Medications Sent to Pharmacy  []  Other     SUMMARY: Please advise if ok to have virtual visit or wait for appt in March for leg symptoms.    Reason for call: Leg Pain  Onset: 3 months    Reports for several months has been with bilateral leg soreness, tightness/pain, tingling. Usually occurs at night or if sitting for long periods of time. No swelling, bruising or redness to legs, no hx of clots, no sob or chest pain. Still able to do usual daily activities. Reports concerns with having clots due to having Covid. No other reported symptoms.   Patient has appt 3/5/24 due to needing appt after 4:40pm, no sooner appt available. Patient requesting if able to have virtual visit or if ok to wait until appt to further discuss.    Advised call back or ED/IC for worsening symptoms, pain or swelling.      Reason for Disposition   Leg pain or muscle cramp is a chronic symptom (recurrent or ongoing AND lasting > 4 weeks)    Protocols used: Leg Pain-A-[place]

## 2024-01-15 ENCOUNTER — PATIENT MESSAGE (OUTPATIENT)
Dept: INTERNAL MEDICINE CLINIC | Facility: CLINIC | Age: 64
End: 2024-01-15

## 2024-01-15 NOTE — TELEPHONE ENCOUNTER
Multiple message about this topic. RN was  third caller. RN pur patient on the wait list in case there is an opening tomorrow.

## 2024-01-15 NOTE — TELEPHONE ENCOUNTER
Note: Requesting a 90 day supply   See TE on 01/12/24, patient returning call, stated cannot come in and needs more than a few hours notice for visit.

## 2024-01-15 NOTE — TELEPHONE ENCOUNTER
Message handled, no action needed. Patient on wait list, she is not able to make appointment today.

## 2024-03-05 ENCOUNTER — OFFICE VISIT (OUTPATIENT)
Dept: INTERNAL MEDICINE CLINIC | Facility: CLINIC | Age: 64
End: 2024-03-05
Payer: COMMERCIAL

## 2024-03-05 VITALS
HEIGHT: 64 IN | OXYGEN SATURATION: 100 % | TEMPERATURE: 98 F | DIASTOLIC BLOOD PRESSURE: 71 MMHG | SYSTOLIC BLOOD PRESSURE: 129 MMHG | HEART RATE: 55 BPM | BODY MASS INDEX: 24.55 KG/M2 | WEIGHT: 143.81 LBS

## 2024-03-05 DIAGNOSIS — I10 HYPERTENSION, BENIGN: ICD-10-CM

## 2024-03-05 DIAGNOSIS — M79.604 LEG PAIN, BILATERAL: ICD-10-CM

## 2024-03-05 DIAGNOSIS — E53.8 VITAMIN B 12 DEFICIENCY: ICD-10-CM

## 2024-03-05 DIAGNOSIS — E78.00 HYPERCHOLESTEROLEMIA: ICD-10-CM

## 2024-03-05 DIAGNOSIS — R73.9 HYPERGLYCEMIA: Primary | ICD-10-CM

## 2024-03-05 DIAGNOSIS — E87.6 HYPOKALEMIA: ICD-10-CM

## 2024-03-05 DIAGNOSIS — M79.605 LEG PAIN, BILATERAL: ICD-10-CM

## 2024-03-05 DIAGNOSIS — Z71.85 VACCINE COUNSELING: ICD-10-CM

## 2024-03-05 DIAGNOSIS — M85.89 OSTEOPENIA OF MULTIPLE SITES: ICD-10-CM

## 2024-03-05 PROCEDURE — 3008F BODY MASS INDEX DOCD: CPT | Performed by: INTERNAL MEDICINE

## 2024-03-05 PROCEDURE — 99215 OFFICE O/P EST HI 40 MIN: CPT | Performed by: INTERNAL MEDICINE

## 2024-03-05 PROCEDURE — 3074F SYST BP LT 130 MM HG: CPT | Performed by: INTERNAL MEDICINE

## 2024-03-05 PROCEDURE — 3078F DIAST BP <80 MM HG: CPT | Performed by: INTERNAL MEDICINE

## 2024-03-05 NOTE — PROGRESS NOTES
HPI:    Patient ID: Elena Rea is a 64 year old female.    Hypertension  Patient is here for follow up of hypertension. BP at home: not check.   Has been compliant with medications.  Exercise level: exercises 7 times a  week (Bikes X 20 minutes) and has been following low salt diet.  Weight has been stable. Cooks more. Not fast food junkie. No added salt.   Wt Readings from Last 3 Encounters:   03/05/24 143 lb 12.8 oz (65.2 kg)   08/25/23 146 lb 9.6 oz (66.5 kg)   08/15/23 147 lb 3.2 oz (66.8 kg)     BP Readings from Last 3 Encounters:   03/05/24 129/71   12/12/23 112/71   08/25/23 138/81     Labs:   Lab Results   Component Value Date/Time    GLU 99 04/22/2022 08:11 PM     04/22/2022 08:11 PM    K 3.5 04/22/2022 08:11 PM     04/22/2022 08:11 PM    CO2 31.0 04/22/2022 08:11 PM    CREATSERUM 0.91 04/22/2022 08:11 PM    CA 9.8 04/22/2022 08:11 PM    AST 21 04/22/2022 12:15 PM    ALT 27 04/22/2022 12:15 PM    TSH 1.460 03/24/2022 09:26 AM        Lab Results   Component Value Date/Time    CHOLEST 155 08/08/2023 03:04 PM    HDL 44 (L) 08/08/2023 03:04 PM    TRIG 142 08/08/2023 03:04 PM    LDL 87 08/08/2023 03:04 PM    NONHDLC 111 08/08/2023 03:04 PM            Wt Readings from Last 3 Encounters:   03/05/24 143 lb 12.8 oz (65.2 kg)   08/25/23 146 lb 9.6 oz (66.5 kg)   08/15/23 147 lb 3.2 oz (66.8 kg)     BP Readings from Last 3 Encounters:   03/05/24 129/71   12/12/23 112/71   08/25/23 138/81     Labs:   Lab Results   Component Value Date/Time    GLU 99 04/22/2022 08:11 PM     04/22/2022 08:11 PM    K 3.5 04/22/2022 08:11 PM     04/22/2022 08:11 PM    CO2 31.0 04/22/2022 08:11 PM    CREATSERUM 0.91 04/22/2022 08:11 PM    CA 9.8 04/22/2022 08:11 PM    AST 21 04/22/2022 12:15 PM    ALT 27 04/22/2022 12:15 PM    TSH 1.460 03/24/2022 09:26 AM        Lab Results   Component Value Date/Time    CHOLEST 155 08/08/2023 03:04 PM    HDL 44 (L) 08/08/2023 03:04 PM    TRIG 142 08/08/2023 03:04 PM    LDL 87  08/08/2023 03:04 PM    NONHDLC 111 08/08/2023 03:04 PM          Daughter in law passed at 35 yo with agrressive  Has string chele and has good support base.     Would like to schedule a physical when due for 6-month follow-up rather than scheduling a physical and follow-up on different dates.    Medication Request  Pertinent negatives include no abdominal pain, chest pain, chills, congestion, coughing, diaphoresis, fatigue, fever, headaches, numbness, rash, sore throat or weakness.   Leg Pain   The pain is present in the left hip and right hip. This is a new problem. The current episode started more than 1 month ago. There has been no history of extremity trauma. The problem occurs constantly. The problem has been unchanged. The quality of the pain is described as aching (Feels pressure.). The pain is moderate. Pertinent negatives include no fever, inability to bear weight or numbness. The symptoms are aggravated by lying down. She has tried nothing for the symptoms. Family history does not include gout or rheumatoid arthritis. There is no history of diabetes, gout, osteoarthritis or rheumatoid arthritis.         Review of Systems   Constitutional:  Negative for activity change, appetite change, chills, diaphoresis, fatigue, fever and unexpected weight change.   HENT:  Positive for trouble swallowing. Negative for congestion, dental problem, drooling, ear discharge, ear pain, facial swelling, hearing loss, mouth sores, nosebleeds, postnasal drip, rhinorrhea, sinus pressure, sinus pain, sneezing, sore throat, tinnitus and voice change.    Eyes:  Negative for photophobia, pain, discharge, redness, itching and visual disturbance.   Respiratory:  Negative for apnea, cough, choking, chest tightness, shortness of breath, wheezing and stridor.    Cardiovascular:  Negative for chest pain, palpitations and leg swelling.   Gastrointestinal:  Negative for abdominal distention and abdominal pain.   Endocrine: Negative for cold  intolerance, heat intolerance, polydipsia, polyphagia and polyuria.   Genitourinary:  Negative for dysuria.   Musculoskeletal:  Negative for gout.   Skin:  Negative for rash.   Allergic/Immunologic: Negative for environmental allergies.   Neurological:  Negative for dizziness, tremors, seizures, syncope, facial asymmetry, speech difficulty, weakness, light-headedness, numbness and headaches.   Psychiatric/Behavioral:  Positive for decreased concentration and dysphoric mood. Negative for agitation, behavioral problems, confusion, hallucinations, self-injury, sleep disturbance and suicidal ideas. The patient is not nervous/anxious and is not hyperactive.    All other systems reviewed and are negative.        Current Outpatient Medications   Medication Sig Dispense Refill    clobetasol 0.05 % External Cream Apply 1 each topically As Directed. Apply thin layer nightly for 1 month, then twice weekly for one month 60 g 1    valACYclovir (VALTREX) 500 MG Oral Tab Take 1 tablet (500 mg total) by mouth 2 (two) times daily. For 3-5 days as needed 30 tablet 2    atenolol 25 MG Oral Tab TAKE 1 TABLET BY MOUTH EVERY 12 HOURS 180 tablet 1    atorvastatin 20 MG Oral Tab Take 1 tablet (20 mg total) by mouth every evening. 90 tablet 1    Triamterene-HCTZ 37.5-25 MG Oral Tab TAKE 1/4 TABLET BY MOUTH EVERY MORNING 90 tablet 1     Allergies:  Allergies   Allergen Reactions    Kiwi Extract     Latex ITCHING and RASH    Watermelon        HISTORY:  Past Medical History:   Diagnosis Date    Bowen's disease     Hyperthyroidism     Screen for colon cancer     repeat CLN in 10  years    Squamous cell cancer of scalp and skin of neck     Chest. Dr. Stanley at [facility]. S/P Moh's.       Past Surgical History:   Procedure Laterality Date          X 2.     DEQUAN BIOPSY STEREO NODULE 1 SITE LEFT (CPT=19081)        Family History   Adopted: Yes   Problem Relation Age of Onset    Heart Disorder Father     Lipids Paternal Aunt      Heart Disorder Paternal Aunt     Lipids Paternal Aunt     Heart Disorder Paternal Aunt       Social History:   Social History     Socioeconomic History    Marital status:    Occupational History    Occupation: Hosp. admin. retired.     Occupation: Runs .     Comment: Self-employed.   Tobacco Use    Smoking status: Never    Smokeless tobacco: Never   Vaping Use    Vaping Use: Never used   Substance and Sexual Activity    Alcohol use: No    Drug use: No    Sexual activity: Not Currently   Other Topics Concern    Caffeine Concern No        PHYSICAL EXAM:   /71 (BP Location: Right arm, Patient Position: Sitting, Cuff Size: adult)   Pulse 55   Temp 98.1 °F (36.7 °C) (Oral)   Ht 5' 4\" (1.626 m)   Wt 143 lb 12.8 oz (65.2 kg)   SpO2 100%   BMI 24.68 kg/m²   BP Readings from Last 3 Encounters:   03/05/24 129/71   12/12/23 112/71   08/25/23 138/81     Wt Readings from Last 3 Encounters:   03/05/24 143 lb 12.8 oz (65.2 kg)   08/25/23 146 lb 9.6 oz (66.5 kg)   08/15/23 147 lb 3.2 oz (66.8 kg)       Physical Exam  Vitals and nursing note reviewed.   Constitutional:       General: She is not in acute distress.     Appearance: Normal appearance. She is well-developed. She is not ill-appearing, toxic-appearing or diaphoretic.      Interventions: She is not intubated.  HENT:      Head:      Jaw: No trismus.      Right Ear: Tympanic membrane, ear canal and external ear normal. No decreased hearing noted. No laceration, drainage, swelling or tenderness. No middle ear effusion. There is no impacted cerumen. No foreign body. No mastoid tenderness. No PE tube. No hemotympanum. Tympanic membrane is not injected, scarred, perforated, erythematous, retracted or bulging. Tympanic membrane has normal mobility.      Left Ear: Tympanic membrane, ear canal and external ear normal. No decreased hearing noted. No laceration, drainage, swelling or tenderness.  No middle ear effusion. There is no impacted cerumen. No  foreign body. No mastoid tenderness. No PE tube. No hemotympanum. Tympanic membrane is not injected, scarred, perforated, erythematous, retracted or bulging. Tympanic membrane has normal mobility.      Nose:      Right Sinus: No maxillary sinus tenderness or frontal sinus tenderness.      Left Sinus: No maxillary sinus tenderness or frontal sinus tenderness.      Mouth/Throat:      Lips: Pink. No lesions.      Mouth: Mucous membranes are moist. Mucous membranes are not pale, not dry and not cyanotic. No injury, lacerations, oral lesions or angioedema.      Dentition: Normal dentition. Does not have dentures. No dental tenderness, gingival swelling, dental caries, dental abscesses or gum lesions.      Tongue: No lesions. Tongue does not deviate from midline.      Palate: No mass and lesions.      Pharynx: Oropharynx is clear. Uvula midline. No pharyngeal swelling, oropharyngeal exudate, posterior oropharyngeal erythema or uvula swelling.      Tonsils: No tonsillar exudate or tonsillar abscesses.   Neck:      Thyroid: No thyroid mass or thyromegaly.      Trachea: Trachea and phonation normal.   Cardiovascular:      Rate and Rhythm: Normal rate and regular rhythm.      Pulses: Normal pulses. No decreased pulses.           Carotid pulses are 2+ on the right side and 2+ on the left side.       Radial pulses are 2+ on the right side and 2+ on the left side.        Dorsalis pedis pulses are 2+ on the right side and 2+ on the left side.        Posterior tibial pulses are 2+ on the right side and 2+ on the left side.      Heart sounds: Normal heart sounds, S1 normal and S2 normal.   Pulmonary:      Effort: Pulmonary effort is normal. No tachypnea, bradypnea, accessory muscle usage, prolonged expiration, respiratory distress or retractions. She is not intubated.      Breath sounds: Normal breath sounds and air entry. No stridor, decreased air movement or transmitted upper airway sounds. No decreased breath sounds, wheezing,  rhonchi or rales.   Chest:      Chest wall: No tenderness.   Abdominal:      General: There is no distension.      Palpations: Abdomen is soft.      Tenderness: There is no abdominal tenderness.   Musculoskeletal:      Cervical back: Neck supple.      Lumbar back: No swelling, edema, deformity, signs of trauma, lacerations, spasms, tenderness or bony tenderness. Decreased range of motion. Negative right straight leg raise test and negative left straight leg raise test. No scoliosis.      Right hip: No deformity, lacerations, tenderness, bony tenderness or crepitus. Normal range of motion. Normal strength.      Left hip: No deformity, lacerations, tenderness, bony tenderness or crepitus. Normal range of motion. Normal strength.      Right lower leg: No edema.      Left lower leg: No edema.      Right ankle:      Right Achilles Tendon: No tenderness or defects. Pro's test negative.      Left ankle:      Left Achilles Tendon: No tenderness or defects. Pro's test negative.        Feet:    Lymphadenopathy:      Head:      Right side of head: No submental, submandibular, preauricular, posterior auricular or occipital adenopathy.      Left side of head: No submental, submandibular, preauricular, posterior auricular or occipital adenopathy.      Cervical: No cervical adenopathy.      Right cervical: No superficial, deep or posterior cervical adenopathy.     Left cervical: No superficial, deep or posterior cervical adenopathy.      Upper Body:      Right upper body: No supraclavicular adenopathy.      Left upper body: No supraclavicular adenopathy.   Skin:     General: Skin is warm and dry.   Neurological:      Mental Status: She is alert and oriented to person, place, and time.      Motor: No tremor or seizure activity.      Deep Tendon Reflexes:      Reflex Scores:       Patellar reflexes are 2+ on the right side and 2+ on the left side.  Psychiatric:         Attention and Perception: She is attentive. She does  not perceive auditory or visual hallucinations.         Mood and Affect: Mood is not anxious, depressed or elated. Affect is tearful. Affect is not labile, blunt, flat, angry or inappropriate.         Speech: She is communicative. Speech is delayed. Speech is not rapid and pressured, slurred or tangential.         Behavior: Behavior normal. Behavior is not agitated, slowed, aggressive, withdrawn, hyperactive or combative. Behavior is cooperative.         Thought Content: Thought content normal. Thought content is not paranoid or delusional. Thought content does not include homicidal or suicidal ideation. Thought content does not include homicidal or suicidal plan.              ASSESSMENT/PLAN:     Encounter Diagnoses   Name Primary?    Hyperglycemia Stable. Lower carbs.    Yes    Hypertension, benign Stable. Careful with diet and excercise at least 30 minutes 3-4 times a week. Check blood pressures at different times on different days. Can purchase own blood pressure monitor. If not, check at local pharmacy. Bake foods more and grill occasionally. Avoid fried foods. No salt. Use other seasonings.         Hypercholesterolemia Check blood.        Hypokalemia Check blood.        Vaccine counseling Recommend shingles vaccine.  There is 2 doses of the vaccine  by 2 months 6 months apart.  Check on insurance coverage on shingles vaccine.  May be covered better at the pharmacy.  Separate shingles vaccine from all of the vaccines by at least 1 to 2 weeks.  Discussed about side effects of vaccine.  Recommend pneumonia 20 vaccine.  Patient will think about it.       Vitamin B 12 deficiency Stable.        Leg pain, bilateral   L plantar fascia. Hold podiatry and PT. Excercises. Cup of ice and rub across for 10 minutes 2 times a day and especially at end of day. Don't leave on. Stretching exercises. No slip ons . Separate shoes in home. Comfy shoes. Stretch in AM prior to get out of bed.  Hold meds.        Dysphagia  Hold testing per pt. Careful with diet. Avoid hot spicy foods and caffeine and caffinated beverages. Careful with acidic foods. Elevate head of bed. Wait 2 hrs. after eating before going to bed to allow digestion. Avoid caffeinated or carbonated beverages, fried foods, spicy foods or highly acidic foods (citrus, onions, tomatoes, etc  -don’t lay down 20-30 minutes after eating or drinking  -use wedge pillow under mattress or use cinder blocks to elevate head of bed-this will prevent reflux at night  -take medications as directed  -quit smoking if applicable  -call if symptoms do not improve within 2-3 days or if symptoms worsen  Follow up with Gastroenterologist    Slowly eat and chew throughly. Water in between.      Grieving. Francois Rules for Coping with Panic      1) Remember that although your feelings and symptoms are frightening, they are neither dangerous nor harmful.  2) Understand that what you are experiencing is merely an exaggeration of your normal reactions to stress.  3) Do not fight your feelings or try to wish them away. The more willing you are to face them, the less intense they will become.  4) Don't add to your panic by thinking about what might happen. If you finding yourself asking, 'What if?' tell yourself, 'So what!'  5) Stay in the present. Be aware of what is happening to you rather than concern yourself with  how much worse it might get.  6) Label your fear level from zero to 10 and watch it go up and down. Notice that it doesn't stay at a very high level for more than a few seconds.  7) When you find yourself thinking about fear, change your what if thinking. Focus on and perform some simple, manageable task.  8) Notice that when you stop thinking frightening thoughts, your anxiety fades.  9) When fear comes, accept it, don't fight it.  Wait and give it some time to pass. Don't try to escape from it.  10)  Be proud of the progress you've made. Think about how good you will feel when the  anxiety has passed and you are in total control and at peace.  Hold counselor and medications.  Has a good support base.    No orders of the defined types were placed in this encounter.      Meds This Visit:  Requested Prescriptions      No prescriptions requested or ordered in this encounter       Imaging & Referrals:  None        RTC after 9-5-24 for physical.

## 2024-03-06 NOTE — PATIENT INSTRUCTIONS
ASSESSMENT/PLAN:     Encounter Diagnoses   Name Primary?    Hyperglycemia Stable. Lower carbs.    Yes    Hypertension, benign Stable. Careful with diet and excercise at least 30 minutes 3-4 times a week. Check blood pressures at different times on different days. Can purchase own blood pressure monitor. If not, check at local pharmacy. Bake foods more and grill occasionally. Avoid fried foods. No salt. Use other seasonings.         Hypercholesterolemia Check blood.        Hypokalemia Check blood.        Vaccine counseling Recommend shingles vaccine.  There is 2 doses of the vaccine  by 2 months 6 months apart.  Check on insurance coverage on shingles vaccine.  May be covered better at the pharmacy.  Separate shingles vaccine from all of the vaccines by at least 1 to 2 weeks.  Discussed about side effects of vaccine.  Recommend pneumonia 20 vaccine.  Patient will think about it.       Vitamin B 12 deficiency Stable.        Leg pain, bilateral   L plantar fascia. Hold podiatry and PT. Excercises. Cup of ice and rub across for 10 minutes 2 times a day and especially at end of day. Don't leave on. Stretching exercises. No slip ons . Separate shoes in home. Comfy shoes. Stretch in AM prior to get out of bed.  Hold meds.        General Neck and Back Pain    Both neck and back pain are usually caused by injury to the muscles or ligaments of the spine. Sometimes the disks that separate each bone of the spine may cause pain by pressing on a nearby nerve. Back and neck pain may appear after a sudden twisting or bending force (such as in a car accident), or sometimes after a simple awkward movement. In either case, muscle spasm is often present and adds to the pain.  Acute neck and back pain usually gets better in 1 to 2 weeks. Pain related to disk disease, arthritis in the spinal joints or spinal stenosis (narrowing of the spinal canal) can become chronic and last for months or years.  Back and neck pain are common  problems. Most people feel better in 1 or 2 weeks, and most of the rest in 1 to 2 months. Most people can remain active.  People have and describe pain differently.  Pain can be sharp, stabbing, shooting, aching, cramping, or burning  Movement, standing, bending, lifting, sitting, or walking may worsen the pain  Pain can be localized to one spot or area, or it can be more generalized  Pain can spread or radiate upwards, downwards, to the front, or go down your arms  Muscle spasm may occur.  Most of the time mechanical problems with the muscles or spine cause the pain. it is usually caused by an injury, whether known or not, to the muscles or ligaments. While illnesses can cause back pain, it is usually not caused by a serious illness. Pain is usually related to physical activity, whether sports, exercise, work, or normal activity. Sometimes it can occur without an identifiable cause. This can happen simply by stretching or moving wrong, without noting pain at the time. Other causes include:  Overexertion, lifting, pushing, pulling incorrectly or too aggressively.  Sudden twisting, bending or stretching from an accident (car or fall), or accidental movement.  Poor posture  Poor conditioning, lack of regular exercise  Spinal disc disease or arthritis  Stress  Pregnancy, or illness like appendicitis, bladder or kidney infection, pelvic infections   Home care  For neck pain: Use a comfortable pillow that supports the head and keeps the spine in a neutral position. The position of the head should not be tilted forward or backward.  When in bed, try to find a position of comfort. A firm mattress is best. Try lying flat on your back with pillows under your knees. You can also try lying on your side with your knees bent up towards your chest and a pillow between your knees.  At first, do not try to stretch out the sore spots. If there is a strain, it is not like the good soreness you get after exercising without an injury.  In this case, stretching may make it worse.  Don't sit for long periods, as in long car rides or other travel. This puts more stress on the lower back than standing or walking.  During the first 24 to 72 hours after an injury, apply an ice pack to the painful area for 20 minutes and then remove it for 20 minutes over a period of 60 to 90 minutes or several times a day.   You can alternate ice and heat therapies. Talk with your healthcare provider about the best treatment for your back or neck pain. As a safety precaution, do not use a heating pad at bedtime. Sleeping with a heating pad can lead to skin burns or tissue damage.  Therapeutic massage can help relax the back and neck muscles without stretching them.  Be aware of safe lifting methods and do not lift anything over 15 pounds until all the pain is gone.  Medicines  Talk to your healthcare provider before using medicine, especially if you have other medical problems or are taking other medicines.  You may use over-the-counter medicine to control pain, unless another pain medicine was prescribed. If you have chronic conditions like diabetes, liver or kidney disease, stomach ulcers,  gastrointestinal bleeding, or are taking blood thinner medicines.  Be careful if you are given pain medicines, narcotics, or medicine for muscle spasm. They can cause drowsiness, and can affect your coordination, reflexes, and judgment. Do not drive or operate heavy machinery.  Follow-up care  Follow up with your healthcare provider, or as advised. Physical therapy or further tests may be needed.  If X-rays were taken, you will be notified of any new findings that may affect your care.  Call 911  Call 911 if any of the following occur:  Trouble breathing  Confusion  Very drowsy or trouble awakening  Fainting or loss of consciousness  Rapid or very slow heart rate  Loss of bowel or bladder control  When to seek medical advice  Call your healthcare provider right away if any of these  occur:  Pain becomes worse or spreads into your arms or legs  Weakness, numbness or pain in one or both arms or legs  Numbness in the groin area  Difficulty walking  Fever of 100.4ºF (38ºC) or higher, or as directed by your healthcare provider  Date Last Reviewed: 7/1/2016 © 2000-2019 [employer] [address] [place] [address]. All rights reserved. This information is not intended as a substitute for professional medical care. Always follow your healthcare professional's instructions.        Back Care Tips    Caring for your back  These are things you can do to prevent a recurrence of acute back pain and to reduce symptoms from chronic back pain:  Stay at a healthy weight. If you are overweight, losing weight will help most types of back pain.  Exercise is an important part of recovery from most types of back pain. The muscles behind and in front of the spine support the back. This means strengthening both the back muscles and the abdominal muscles will provide better support for your spine.   Swimming and brisk walking are good overall exercises to improve your fitness level.  Practice safe lifting methods (see below).  Practice good posture when sitting, standing, and walking. Don't sit for a long time. This puts more stress on the lower back than standing or walking.  Wear quality shoes with good arch support. Foot and ankle alignment can affect back symptoms. Don't wear high heels.  Therapeutic massage can help relax the back muscles without stretching them.  During the first 24 to 72 hours after an acute injury or flare-up of chronic back pain, put an ice pack on the painful area for 20 minutes and then remove it for 20 minutes. Do thisover a period of 60 to 90 minutes, or several times a day. As a safety precaution, don't use a heating pad at bedtime. Sleeping on a heating pad can lead to skin burns or tissue damage.  You can alternate using ice and heat.  Medicines  Talk with your  healthcare provider before using medicines, especially if you have other health problems or are taking other medicines.  You may use over-the-counter medicines, such as acetaminophen, ibuprofen, or naprosyn to control pain, unless your healthcare provider prescribed other pain medicine. Talk with your healthcare provider before taking any medicines if you have a chronic condition such ass diabetes, liver or kidney disease, stomach ulcers, or digestive bleeding, or are taking blood thinners.  Be careful if you are given prescription pain medicines, opioids, or medicine for muscle spasm. They can cause drowsiness, and affect your coordination, reflexes, and judgment. Don't drive or operate heavy machinery while taking these types of medicines. Take prescription pain medicine only as prescribed by your healthcare provider.  Lumbar stretch  This simple stretch will help relax muscle spasm and keep your back more limber. If exercise makes your back pain worse, don’t do it.  Lie on your back with your knees bent and both feet on the ground.  Slowly raise your left knee to your chest as you flatten your lower back against the floor. Hold for 5 seconds.  Relax and repeat the exercise with your right knee.  Do 10 of these exercises for each leg.  Safe lifting method  Don’t bend over at the waist to lift an object off the floor.  Instead, bend your knees and hips in a squat.   Keep your back and head upright  Hold the object close to your body, directly in front of you.  Straighten your legs to lift the object.   Lower the object to the floor in the reverse fashion.  If you must slide something across the floor, push it.    Posture tips  Sitting  Sit in chairs with straight backs or low-back support. Keep your knees lower than your hips, with your feet flat on the floor.  When driving, sit up straight. Adjust the seat forward so you are not leaning toward the steering wheel.  A small pillow or rolled towel behind your lower  back may help if you are driving long distances.   Standing  When standing for long periods, shift most of your weight to one leg at a time. Switch legs every few minutes.   Sleeping  The best way to sleep is on your side with your knees bent. Put a low pillow under your head to support your neck in a neutral spine position. Don't use thick pillows that bend your neck to one side. Put a pillow between your legs to further relax your lower back. If you sleep on your back, put pillows under your knees to support your legs in a slightly flexed position. Use a firm mattress. If your mattress sags, replace it, or use a 1/2-inch plywood board under the mattress to add support.  Follow-up care  Follow up with your healthcare provider, or as advised.  If X-rays, a CT scan or an MRI scan were taken, they may be reviewed by a radiologist. You will be told of any new findings that may affect your care.  Call 911  Call 911 if any of the following occur:  Trouble breathing  Confusion  Very drowsy  Fainting or loss of consciousness  Rapid or very slow heart rate  Loss of  bowel or bladder control  When to seek medical advice  Call your healthcare provider right away if any of the following occur:  Pain becomes worse or spreads to your arms or legs  Weakness or numbness in one or both arms or legs  Numbness in the groin area  Date Last Reviewed: 6/1/2016  © 5900-4247 [employer]. [address] [place], [place] [id number]. All rights reserved. This information is not intended as a substitute for professional medical care. Always follow your healthcare professional's instructions.        Exercises to Strengthen Your Lower Back  Strong lower back and abdominal muscles work together to support your spine. The exercises below will help strengthen the lower back. It is important that you begin exercising slowly and increase levels gradually.  Always begin any exercise program with stretching. If you feel pain while doing  any of these exercises, stop and talk to your doctor about a more specific exercise program that better suits your condition.   Low back stretch  The point of stretching is to make you more flexible and increase your range of motion. Stretch only as much as you are able. Stretch slowly. Do not push your stretch to the limit. If at any point you feel pain while stretching, this is your (temporary) limit.  Lie on your back with your knees bent and both feet on the ground.  Slowly raise your left knee to your chest as you flatten your lower back against the floor. Hold for 5 seconds.  Relax and repeat the exercise with your right knee.  Do 10 of these exercises for each leg.  Repeat hugging both knees to your chest at the same time.  Building lower back strength  Start your exercise routine with 10 to 30 minutes a day, 1 to 3 times a day.  Initial exercises  Lying on your back:  Ankle pumps: Move your foot up and down, towards your head, and then away. Repeat 10 times with each foot.  Heel slides: Slowly bend your knee, drawing the heel of your foot towards you. Then slide your heel/foot from you, straightening your knee. Do not lift your foot off the floor (this is not a leg lift).  Abdominal contraction: Bend your knees and put your hands on your stomach. Tighten your stomach muscles. Hold for 5 seconds, then relax. Repeat 10 times.  Straight leg raise: Bend one leg at the knee and keep the other leg straight. Tighten your stomach muscles. Slowly lift your straight leg 6 to 12 inches off the floor and hold for up to 5 seconds. Repeat 10 times on each side.  Standing:  Wall squats: Stand with your back against the wall. Move your feet about 12 inches away from the wall. Tighten your stomach muscles, and slowly bend your knees until they are at about a 45 degree angle. Do not go down too far. Hold about 5 seconds. Then slowly return to your starting position. Repeat 10 times.  Heel raises: Stand facing the wall. Slowly  raise the heels of your feet up and down, while keeping your toes on the floor. If you have trouble balancing, you can touch the wall with your hands. Repeat 10 times.  More advanced exercises  When you feel comfortable enough, try these exercises.  Kneeling lumbar extension: Begin on your hands and knees. At the same time, raise and straighten your right arm and left leg until they are parallel to the ground. Hold for 2 seconds and come back slowly to a starting position. Repeat with left arm and right leg, alternating 10 times.  Prone lumbar extension: Lie face down, arms extended overhead, palms on the floor. At the same time, raise your right arm and left leg as high as comfortably possible. Hold for 10 seconds and slowly return to start. Repeat with left arm and right leg, alternating 10 times. Gradually build up to 20 times. (Advanced: Repeat this exercise raising both arms and both legs a few inches off the floor at the same time. Hold for 5 seconds and release.)  Pelvic tilt: Lie on the floor on your back with your knees bent at 90 degrees. Your feet should be flat on the floor. Inhale, exhale, then slowly contract your abdominal muscles bringing your navel toward your spine. Let your pelvis rock back until your lower back is flat on the floor. Hold for 10 seconds while breathing smoothly.  Abdominal crunch: Perform a pelvic tilt (above) flattening your lower back against the floor. Holding the tension in your abdominal muscles, take another breath and raise your shoulder blades off the ground (this is not a full sit-up). Keep your head in line with your body (don’t bend your neck forward). Hold for 2 seconds, then slowly lower.  Date Last Reviewed: 6/1/2016  © 6328-4494 [employer]. [address] [place] [address], [place] [address]. All rights reserved. This information is not intended as a substitute for professional medical care. Always follow your healthcare professional's instructions.        Back  Pain (Acute or Chronic)    Back pain is one of the most common problems. The good news is that most people feel better in 1 to 2 weeks, and most of the rest in 1 to 2 months. Most people can remain active.  People who have pain describe it differently--not everyone is the same.  The pain can be sharp, stabbing, shooting, aching, cramping or burning.  Movement, standing, bending, lifting, sitting, or walking may worsen pain.  It can be localized to one spot or area, or it can be more generalized.  It can spread or radiate upwards, to the front, or go down your arms or legs (sciatica).  It can cause muscle spasm.  Most of the time, mechanical problems with the muscles or spine cause the pain. Mechanical problems are usually caused by an injury to the muscles or ligaments. While illness can cause back pain, it is usually not caused by a serious illness. Mechanical problems include:   Physical activity such as sports, exercise, work, or normal activity  Overexertion, lifting, pushing, pulling incorrectly or too aggressively  Sudden twisting, bending, or stretching from an accident, or accidental movement  Poor posture  Stretching or moving wrong, without noticing pain at the time  Poor coordination, lack of regular exercise (check with your doctor about this)  Spinal disc disease or arthritis  Stress  Pain can also be related to pregnancy, or illness like appendicitis, bladder or kidney infections, pelvic infections, and many other things.  Acute back pain usually gets better in 1 to 2 weeks. Back pain related to disk disease, arthritis in the spinal joints or spinal stenosis (narrowing of the spinal canal) can become chronic and last for months or years.  Unless you had a physical injury (for example, a car accident or fall) X-rays are usually not needed for the initial evaluation of back pain. If pain continues and does not respond to medical treatment, X-rays and other tests may be needed.  Home care  Try these home  care recommendations:  When in bed, try to find a position of comfort. A firm mattress is best. Try lying flat on your back with pillows under your knees. You can also try lying on your side with your knees bent up towards your chest and a pillow between your knees.  At first, do not try to stretch out the sore spots. If there is a strain, it is not like the good soreness you get after exercising without an injury. In this case, stretching may make it worse.  Don't sit for long periods, as in a long car ride or during other travel. This puts more stress on the lower back than standing or walking.  During the first 24 to 72 hours after an acute injury or flare up of chronic back pain, apply an ice pack to the painful area for 20 minutes and then remove it for 20 minutes. Do this over a period of 60 to 90 minutes or several times a day. This will reduce swelling and pain. Wrap the ice pack in a thin towel or plastic to protect your skin.  You can start with ice, then switch to heat. Heat (hot shower, hot bath, or heating pad) reduces pain and works well for muscle spasms. Heat can be applied to the painful area for 20 minutes then remove it for 20 minutes. Do this over a period of 60 to 90 minutes or several times a day. Do not sleep on a heating pad. It can lead to skin burns or tissue damage.  You can alternate ice and heat therapy. Talk with your doctor about the best treatment for your back pain.  Therapeutic massage can help relax the back muscles without stretching them.  Be aware of safe lifting methods and do not lift anything without stretching first.  Medicines  Talk to your doctor before using medicine, especially if you have other medical problems or are taking other medicines.  You may use over-the-counter medicine as directed on the bottle to control pain, unless another pain medicine was prescribed. If you have chronic conditions like diabetes, liver or kidney disease, stomach ulcers, or  gastrointestinal bleeding, or are taking blood thinners, talk to your doctor before taking any medicine.  Be careful if you are given a prescription medicines, narcotics, or medicine for muscle spasms. They can cause drowsiness, affect your coordination, reflexes, and judgement. Do not drive or operate heavy machinery.  Follow-up care  Follow up with your healthcare provider, or as advised.   A radiologist will review any X-rays that were taken. Your provide will notify you of any new findings that may affect your care.  Call 911  Call 911 if any of the following occur:  Trouble breathing  Confusion  Very drowsy or trouble awakening  Fainting or loss of consciousness  Rapid or very slow heart rate  Loss of bowel or bladder control  When to seek medical advice  Call your healthcare provider right away if any of these occur:   Pain becomes worse or spreads to your legs  Weakness or numbness in one or both legs  Numbness in the groin or genital area  Date Last Reviewed: 7/1/2016  © 9176-1278 [employer]. [address] [place] [address]. All rights reserved. This information is not intended as a substitute for professional medical care. Always follow your healthcare professional's instructions.        Self-Care for Low Back Pain    Most people have low back pain now and then. In many cases, it isn’t serious and self-care can help. Sometimes low back pain can be a sign of a bigger problem. Call your healthcare provider if your pain returns often or gets worse over time. For the long-term care of your back, get regular exercise, lose any excess weight and learn good posture.  Take a short rest  Lying down during the day may be helpful for short periods of time if severe pain increases with sitting or standing. Long-term bed rest could be damaging.  Reduce pain and swelling  Cold reduces swelling. Both cold and heat can reduce pain. Protect your skin by placing a towel between your body and the ice  or heat source.  For the first few days, apply an ice pack for 15 to 20 minutes, several times a day. To make a cold pack, put ice cubes in a plastic bag that seals at the top.  After the first few days, try heat for 15 minutes at a time to ease pain. Never sleep on a heating pad.  Over-the-counter medicine can help control pain and swelling. Try aspirin or a non-steroidal anti-inflammatory medicine (NSAID) such as ibuprofen.  Exercise  Exercise can help your back heal. It also helps your back get stronger and more flexible, preventing any reinjury. Ask your healthcare provider about specific exercises for your back.  Use good posture to avoid reinjury  When moving, bend at the hips and knees. Don’t bend at the waist or twist around.  When lifting, keep the object close to your body. Lift heavy items using your legs, not your back. Don’t try to lift more than you can handle.  When sitting, keep your lower back supported. Use a rolled-up towel as needed.    Seek medical care right away if:  You can't stand or walk.  You have a temperature over 100.4°F (38.0°C)  You have frequent, painful, or bloody urination.  You have severe abdominal pain.  You have a sharp, stabbing pain.  Your pain is constant.  You have pain, tingling, or numbness in your leg.  You feel pain in a new area of your back.  You notice that the pain isn’t decreasing after more than a week.  Date Last Reviewed: 3/1/2018  © 8300-7753 [employer]. [address] [place] [address], [place] [address]. All rights reserved. This information is not intended as a substitute for professional medical care. Always follow your healthcare professional's instructions.        Relieving Back Pain  Back pain is a common problem. You can strain back muscles by lifting too much weight or just by moving the wrong way. Back strain can be uncomfortable, even painful. And it can take weeks or months to improve. To help yourself feel better and prevent future back  strains, try these tips.  Important: Don't give aspirin to children or teens without first discussing it with your child's healthcare provider.  Ice    Ice reduces muscle pain and swelling. It helps most during the first 24 to 48 hours after an injury.  Wrap an ice pack or a bag of frozen peas in a thin towel. Never put ice directly on your skin.  Place the ice where your back hurts the most.  Don’t ice for more than 20 minutes at a time.  You can use ice several times a day.  Medicines  Over-the-counter pain relievers include acetaminophen and anti-inflammatory medicines, which includes aspirin, naproxen, or ibuprofen. They can help ease discomfort. Some also reduce swelling.  Tell your healthcare provider about any medicines you are already taking.  Take medicines only as directed.  Manipulation and massage  Having manipulation by an osteopathic doctor or chiropractor may be helpful. Getting a massage also may help.   Heat  After the first 48 hours, heat can relax sore muscles and improve blood flow.  Try a warm bath or shower. Or use a heating pad set on low. To prevent a burn, keep a cloth between you and the heating pad.  Don’t use a heating pad for more than 15 minutes at a time. Never sleep on a heating pad.  Date Last Reviewed: 6/1/2018  © 2250-6777 [employer]. [address] [place] [address]. All rights reserved. This information is not intended as a substitute for professional medical care. Always follow your healthcare professional's instructions.        Back Safety for Healthcare Workers     Bend at your knees and hips when bending down.   Whether you’re moving a patient, lifting a box of supplies, or pushing a cart or wheelchair, your back is always working. Use the tips below to help you reduce your risk of back injury.  Reaching  Reaching for records, files, or supplies, especially in high places, can strain your back:  Reach only as high as your shoulders.  Use a stool or  stepladder if you need to get closer to the load.  Test the weight of the load by pushing up on a corner before lifting. If it’s too heavy, get help.  Bending and lifting  When you’re bending down to reach or lift, move your whole body to protect your back:  Bend your knees and hips, not your back. Don't bend or lift if you are off balance or if your back is twisted.  Kneel down on 1 knee, if necessary.  Get as close to the object as you can, so you won’t have to reach with your arms.  Keep the load close to your body. “Hug” it unless there is a chance it may contain sharps.  Tighten your stomach muscles to support your back when you lift.  Lift with your legs, not your back.  Maintain a wide base of support. Keep feet shoulder-width apart, or one foot slightly in front of the other.  Pushing and pulling  Pulling larger objects can be as hard on your back as lifting. Whenever possible, push instead:  Push with both arms, keeping your elbows bent.  Stay close to the load, without leaning forward.  Tighten your stomach muscles as you push.  Date Last Reviewed: 3/1/2018  © 8130-9954 [employer]. [address] [place] [address], [place] [address]. All rights reserved. This information is not intended as a substitute for professional medical care. Always follow your healthcare professional's instructions.        Caring for Your Back Throughout the Day    Take care of your back throughout the day. You will likely have fewer back problems if you do. Try to warm up before you move. Shift positions often. Also do your best to form healthy habits.  Warm up for the day  Do a few slow, catlike stretches before starting your day. This simple warmup can soften your disks, stretch your back muscles, and help prevent injuries.  Shift positions often  At work and at home, change positions often. This helps keep your body from getting stiff. Stand up or lean back while you sit. If you can, get up and move every 1/2 hour.  Form  healthy habits  Here are some suggestions:   Keep a healthy weight. When you weigh too much, your back is under excess strain. But losing just a few extra pounds can help a lot.  Try not to overeat. Learn about serving sizes. The size of a serving depends on the food and the food group. Many foods list serving sizes on the labels.  Handle minor aches with cold and heat. Apply cold the first 24 to 48 hours. Use heat after that. Always place a thin cloth between your skin and the source of cold or heat.  Take medicines as directed. This helps keep pain under control. Always read labels, and call your healthcare provider or pharmacist if you have any questions.  Walk each day  A daily walk keeps your back and thigh muscles stretched and strong. This gives your back better support. Be sure to walk with your spine’s three curves aligned, by keeping your head, hips, and toes connected by a vertical line.  Date Last Reviewed: 5/1/2018  © 4601-2007 [employer]. [address] [place] [address], [place] [address]. All rights reserved. This information is not intended as a substitute for professional medical care. Always follow your healthcare professional's instructions.        How Your Back Works  A healthy back lets you bend and stretch without pain. The spine has 3 natural curves. These keep your body balanced. Strong, flexible muscles support your spine. Soft, cushioning disks separate the hard bones of your spine. The disks let your spine bend and move.    The parts of the spine  The vertebrae are the 24 bones that make up the spine.  The spinous process is the part of each vertebra you can feel through your skin.  Each of these bones has a central hole that runs top to bottom. Together these holes form a tunnel called the spinal canal.  The lamina of each vertebra forms the back of the spinal canal.  Running through the canal are nerves. They are attached in a bundle called the spinal cord for most of the canal.  A  foramen is a small opening where a spinal nerve root leaves the spinal canal.  Disks serve as cushions between vertebrae. A disk’s soft center absorbs shock during movement.     Two vertebrae and a disk     The supporting muscles  Strong, flexible muscles help maintain your 3 natural curves. They hold your spine in correct alignment. This helps support your upper body. Strong core muscles help take the strain off your back. These include the stomach, buttock, and thigh muscles.  Date Last Reviewed: 1/1/2018  © 4705-1405 [employer]. [address] [place] [address]. All rights reserved. This information is not intended as a substitute for professional medical care. Always follow your healthcare professional's instructions.        Relieving Tension in Your Back  Being relaxed helps keep your mind healthy and your back ready to move. Take short breaks often. Walk around. Stretch. Switch tasks. Also give the following a try.  Make time to relax. Start by setting aside 5 minutes daily.  Deep breathing    Deep breathing is a simple way to reduce stress. You can do it almost any time you need to relax.  Inhale slowly through your nose. Let your lungs and stomach expand.  Hold your breath for 2 to 3 seconds.  Exhale slowly through your mouth until your lungs feel empty. Repeat 3 to 4 times.  Relieve tension  Muscle tension can create tender spots called trigger points. The tips below may help relieve muscle tension.  Press the trigger point if you can reach it. If not, lie on a soft tennis ball, or ask a friend to press the spot. Use steady pressure for 10 to 15 seconds. Breathe deeply. Repeat a few times.  Massage trigger points with ice for 2 to 5 minutes. Press lightly at first. Slowly increase firmness.  Date Last Reviewed: 5/1/2018  © 5096-5280 [employer]. [address] [place] [address]. All rights reserved. This information is not intended as a substitute for  professional medical care. Always follow your healthcare professional's instructions.        Back Exercises: Lower Back Rotation    To start, lie on your back with your knees bent and feet flat on the floor. Don’t press your neck or lower back to the floor. Breathe deeply. You should feel comfortable and relaxed in this position.  Drop both knees to one side. Turn your head to the other side. Keep your shoulders flat on the floor.  Do not push through pain.  Hold for 20 seconds.  Slowly switch sides.  Repeat 2 to 5 times.  Date Last Reviewed: 3/1/2018  © 8540-9993 [employer]. [address] [place] [address]. All rights reserved. This information is not intended as a substitute for professional medical care. Always follow your healthcare professional's instructions.        Back Exercises: Lower Back Stretch    To start, sit in a chair with your feet flat on the floor. Shift your weight slightly forward. Relax, and keep your ears, shoulders, and hips aligned while you do the following:  Sit with your feet well apart.  Bend forward and touch the floor with the backs of your hands. Relax and let your body drop.  Hold for 20 seconds. Return to starting position.  Repeat 2 times.   Date Last Reviewed: 11/1/2017  © 7961-7607 [employer]. [address] [place] [address]. All rights reserved. This information is not intended as a substitute for professional medical care. Always follow your healthcare professional's instructions.        Back Exercises: Seated Rotation    To start, sit in a chair with your feet flat on the floor. Shift your weight slightly forward to avoid rounding your back. Relax, and keep your ears, shoulders, and hips aligned:  Fold your arms and elbows just below shoulder height.  Turn from the waist with hips forward. Turn your head last. Do not push through the pain.  Hold for a count of 10 to 30. Return to starting position.  Repeat 3 to 5 times on one  side. Then switch sides.  Date Last Reviewed: 3/1/2018  © 7218-4618 [employer]. [address] [place] [address], [place] [address]. All rights reserved. This information is not intended as a substitute for professional medical care. Always follow your healthcare professional's instructions.         Dysphagia Hold testing per pt. Careful with diet. Avoid hot spicy foods and caffeine and caffinated beverages. Careful with acidic foods. Elevate head of bed. Wait 2 hrs. after eating before going to bed to allow digestion. Avoid caffeinated or carbonated beverages, fried foods, spicy foods or highly acidic foods (citrus, onions, tomatoes, etc  -don’t lay down 20-30 minutes after eating or drinking  -use wedge pillow under mattress or use cinder blocks to elevate head of bed-this will prevent reflux at night  -take medications as directed  -quit smoking if applicable  -call if symptoms do not improve within 2-3 days or if symptoms worsen  Follow up with Gastroenterologist    Slowly eat and chew throughly. Water in between.      Grieving. Francois Rules for Coping with Panic      1) Remember that although your feelings and symptoms are frightening, they are neither dangerous nor harmful.  2) Understand that what you are experiencing is merely an exaggeration of your normal reactions to stress.  3) Do not fight your feelings or try to wish them away. The more willing you are to face them, the less intense they will become.  4) Don't add to your panic by thinking about what might happen. If you finding yourself asking, 'What if?' tell yourself, 'So what!'  5) Stay in the present. Be aware of what is happening to you rather than concern yourself with  how much worse it might get.  6) Label your fear level from zero to 10 and watch it go up and down. Notice that it doesn't stay at a very high level for more than a few seconds.  7) When you find yourself thinking about fear, change your what if thinking. Focus on and  perform some simple, manageable task.  8) Notice that when you stop thinking frightening thoughts, your anxiety fades.  9) When fear comes, accept it, don't fight it.  Wait and give it some time to pass. Don't try to escape from it.  10)  Be proud of the progress you've made. Think about how good you will feel when the anxiety has passed and you are in total control and at peace.  Hold counselor and medications.  Has a good support base.    No orders of the defined types were placed in this encounter.      Meds This Visit:  Requested Prescriptions      No prescriptions requested or ordered in this encounter       Imaging & Referrals:  None        RTC after 9-5-24 for physical.

## 2024-03-07 ENCOUNTER — PATIENT MESSAGE (OUTPATIENT)
Dept: INTERNAL MEDICINE CLINIC | Facility: CLINIC | Age: 64
End: 2024-03-07

## 2024-03-07 DIAGNOSIS — M79.10 MYALGIA: Primary | ICD-10-CM

## 2024-03-08 NOTE — TELEPHONE ENCOUNTER
Dr CATARINA Ramos,    See pts' mychart message. Last office visit 3/5/24. Were these options discussed with pt at office visit? Do you need appt to discuss further?    Please reply to pool: RONALD RN TRIAGE

## 2024-03-11 ENCOUNTER — TELEPHONE (OUTPATIENT)
Dept: INTERNAL MEDICINE CLINIC | Facility: CLINIC | Age: 64
End: 2024-03-11

## 2024-03-11 NOTE — TELEPHONE ENCOUNTER
Please send both of those [facility] to [facility] [place].  Please get fax confirmation and scanned to the system that fax confirmation.

## 2024-03-11 NOTE — TELEPHONE ENCOUNTER
Pt states that the doctor put in an order for a ultra sound of the legs and X-ray of the back. Per the patient she is having them done at [facility] in [place]. Pt would like to know if these orders can be faxed to them today. Pt only has a phone number for [facility] phone: [phone]. Patient did not provide a fax number.

## 2024-03-11 NOTE — TELEPHONE ENCOUNTER
Pt contacted advised her imaging orders were faxed to [facility] [phone].    No further questions.

## 2024-03-15 ENCOUNTER — TELEPHONE (OUTPATIENT)
Dept: INTERNAL MEDICINE CLINIC | Facility: CLINIC | Age: 64
End: 2024-03-15

## 2024-03-15 DIAGNOSIS — R10.12 LEFT UPPER QUADRANT ABDOMINAL PAIN: Primary | ICD-10-CM

## 2024-03-15 NOTE — TELEPHONE ENCOUNTER
I called patient, she verified [facility] fax #. She also states she is not sure what meds to hold, she needs clarification. She would like a call back with clarification. I made her aware I will convey the above to Dr. Ramos. Patient verbalized understanding. No further questions or concerns at this time.    [facility]  Fax#: [phone]     I faxed both ultrasounds that were ordered today to [facility]    Dr. Ramos to advise what medication(s) need to be held

## 2024-03-15 NOTE — TELEPHONE ENCOUNTER
L UQ pain. Sharp pain. No aggravating factors. Qday since last OV. Not assox. With food. nO decrease with BM. Not blaoted. Please fax to order to [facility]. Hold meds.

## 2024-03-17 ENCOUNTER — PATIENT MESSAGE (OUTPATIENT)
Dept: INTERNAL MEDICINE CLINIC | Facility: CLINIC | Age: 64
End: 2024-03-17

## 2024-03-17 LAB
ALBUMIN/GLOBULIN RATIO: 1.8 (CALC) (ref 1–2.5)
ALBUMIN: 4.6 G/DL (ref 3.6–5.1)
ALKALINE PHOSPHATASE: 91 U/L (ref 37–153)
ALT: 22 U/L (ref 6–29)
AST: 21 U/L (ref 10–35)
BILIRUBIN, TOTAL: 1.1 MG/DL (ref 0.2–1.2)
BUN: 13 MG/DL (ref 7–25)
CALCIUM: 9.9 MG/DL (ref 8.6–10.4)
CARBON DIOXIDE: 29 MMOL/L (ref 20–32)
CHLORIDE: 105 MMOL/L (ref 98–110)
CHOL/HDLC RATIO: 2.8 (CALC)
CHOLESTEROL, TOTAL: 187 MG/DL
CREATININE: 0.81 MG/DL (ref 0.5–1.05)
EGFR: 81 ML/MIN/1.73M2
GLOBULIN: 2.5 G/DL (CALC) (ref 1.9–3.7)
GLUCOSE: 93 MG/DL (ref 65–99)
HDL CHOLESTEROL: 66 MG/DL
LDL-CHOLESTEROL: 100 MG/DL (CALC)
NON-HDL CHOLESTEROL: 121 MG/DL (CALC)
POTASSIUM: 3.9 MMOL/L (ref 3.5–5.3)
PROTEIN, TOTAL: 7.1 G/DL (ref 6.1–8.1)
SODIUM: 141 MMOL/L (ref 135–146)
TRIGLYCERIDES: 114 MG/DL
VITAMIN D, 25-OH, TOTAL: 30 NG/ML (ref 30–100)

## 2024-03-17 NOTE — TELEPHONE ENCOUNTER
From: Elena Rea  To: Destini Ramos  Sent: 3/17/2024 8:28 AM CDT  Subject: Atorvastatin    Good morning Dr. Ramos-  I received a call from one of your nurses on Friday. She went over the tests that you ordered when we spoke which I have scheduled at [facility] in a couple of weeks. She also mentioned that I should “hold my medication“ per your direction. ( you did not mention that to me when we spoke )I did not think that was for my blood pressure medications. Alot of my symptoms appear to be side effects of taking the atorvastatin. So, I did not take it Friday or Saturday night. My blood work was drawn Saturday morning. I did not take the statin last night either. I will, out of precaution, take the atorvastatin this evening. I will wait to hear from you as to whether I should continue it. Is there a 10 mg atorvastatin? And or do you feel I should take it every other day for now?  Thank you, Elena

## 2024-03-25 ENCOUNTER — TELEPHONE (OUTPATIENT)
Dept: INTERNAL MEDICINE CLINIC | Facility: CLINIC | Age: 64
End: 2024-03-25

## 2024-03-25 ENCOUNTER — MED REC SCAN ONLY (OUTPATIENT)
Dept: INTERNAL MEDICINE CLINIC | Facility: CLINIC | Age: 64
End: 2024-03-25

## 2024-03-25 DIAGNOSIS — M54.50 MIDLINE LOW BACK PAIN WITHOUT SCIATICA, UNSPECIFIED CHRONICITY: Primary | ICD-10-CM

## 2024-03-25 NOTE — TELEPHONE ENCOUNTER
Venous ultrasound of right and left leg shows no clot.  Back x-rays shows old fracture of L2 which is 3 levels above the waist.  Multiple areas of osteoarthritis.

## 2024-03-26 ENCOUNTER — PATIENT MESSAGE (OUTPATIENT)
Dept: INTERNAL MEDICINE CLINIC | Facility: CLINIC | Age: 64
End: 2024-03-26

## 2024-03-26 DIAGNOSIS — G89.29 OTHER CHRONIC BACK PAIN: Primary | ICD-10-CM

## 2024-03-26 DIAGNOSIS — M54.9 OTHER CHRONIC BACK PAIN: Primary | ICD-10-CM

## 2024-03-26 NOTE — TELEPHONE ENCOUNTER
Triage support staff at [facility]: can you please print and mail to patient home the PT order dated 3/26/24.     Patient needs hardcopy. She is unable to print from [facility]. She might be using external PT facility.     Patient notified of provider's recommendation.  Patient verbalized understanding.

## 2024-03-26 NOTE — TELEPHONE ENCOUNTER
On site staff, can you please print and mail the physical therapy orders. . Unable to print the orders, page prints blank.

## 2024-03-26 NOTE — TELEPHONE ENCOUNTER
Can try a course of physical therapy will put the order in if she wants to see if that will help and we can follow-up after that.
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
## 2024-03-27 NOTE — TELEPHONE ENCOUNTER
See orders for x-rays of cervical spine and thoracic spine.  Please fax orders to [facility].

## 2024-03-30 ENCOUNTER — PATIENT MESSAGE (OUTPATIENT)
Dept: INTERNAL MEDICINE CLINIC | Facility: CLINIC | Age: 64
End: 2024-03-30

## 2024-04-01 ENCOUNTER — PATIENT MESSAGE (OUTPATIENT)
Dept: INTERNAL MEDICINE CLINIC | Facility: CLINIC | Age: 64
End: 2024-04-01

## 2024-04-01 ENCOUNTER — TELEPHONE (OUTPATIENT)
Dept: INTERNAL MEDICINE CLINIC | Facility: CLINIC | Age: 64
End: 2024-04-01

## 2024-04-01 DIAGNOSIS — R93.7 ABNORMAL X-RAY OF SPINE: Primary | ICD-10-CM

## 2024-04-01 NOTE — TELEPHONE ENCOUNTER
From: Elena Rea  To: Destini Ramos  Sent: 4/1/2024 9:21 AM CDT  Subject: [facility] Fax%    I want to be sure that it is being faxed to the correct phone number as I believe the when those orders were faxed originally, the number was incorrect. Also, I would appreciate it if you would call that office and be sure they’ve received them. When a patient calls in they are directed to a call center and not directly to that particular location. A physician practice can get through. Thank you.  Fax# [phone]

## 2024-04-01 NOTE — TELEPHONE ENCOUNTER
Follow up from pt,\"  I want to be sure that it is being faxed to the correct phone number as I believe the  when those orders were faxed originally, the number was incorrect. Also, I would appreciate it if you would call that office and be sure they’ve received them. When a patient calls in they are directed to a call center and not directly to that particular location. A physician practice can get through. Thank you.  Fax# [phone]

## 2024-04-01 NOTE — TELEPHONE ENCOUNTER
Called patient and verified date of birth. Results/recommendations reviewed and patient verbalizes understanding.      Pt states:     Wants a good urologist not just any urologist.  Pt seeking recommendations    2. Pt wants to know if the cervical x ray showed her herniated discs at c6 and c7    3. Pt wants Dr Ramos to be more specific for the lung x ray \"saying they see something is concerning\"    4. Pt states the US was also supposed to include the pancreas, liver, and spleen and pt wants an order placed for that.      Please advise.        Pt is using [facility] and requests order for CT of chest faxed to them at [phone].  Order faxed.

## 2024-04-01 NOTE — TELEPHONE ENCOUNTER
Ultrasound order's refaxed  [facility] fax: [phone]  Faxed via [employer] at 8:54 am- confirmation receipt received  Patient notified via MyChart reply

## 2024-04-01 NOTE — TELEPHONE ENCOUNTER
.  Abdominal ultrasound shows nonobstructing right-sided kidney stone.  A 0.3 cm so probably will not pass on its own.  Would recommend follow-up with urology.  Will not explain the pain on the left flank.  X-ray of the neck shows some curvature of the neck but nothing acute some osteoarthritis changes in the neck.  X-ray of the upper back there is something on the middle of the rib the posterior needs CT of the chest.  Orders written.

## 2024-04-02 ENCOUNTER — PATIENT MESSAGE (OUTPATIENT)
Dept: INTERNAL MEDICINE CLINIC | Facility: CLINIC | Age: 64
End: 2024-04-02

## 2024-04-02 NOTE — TELEPHONE ENCOUNTER
From: Elena Rea  To: Destini Ramos  Sent: 4/2/2024 7:50 AM CDT  Subject: Test Results    Good morning Dr. Ramos-  Your nurse called me about the test results yesterday but I’d really like to speak with you and get some clarification. Can you please call me at some point today?   Thank you so very much!  Elena

## 2024-04-05 NOTE — TELEPHONE ENCOUNTER
Can we call [facility] to get the chest x-ray that she had done just recently.  I am not seeing it under scanned records.
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
## 2024-04-09 ENCOUNTER — PATIENT MESSAGE (OUTPATIENT)
Dept: INTERNAL MEDICINE CLINIC | Facility: CLINIC | Age: 64
End: 2024-04-09

## 2024-04-10 ENCOUNTER — TELEPHONE (OUTPATIENT)
Dept: INTERNAL MEDICINE CLINIC | Facility: CLINIC | Age: 64
End: 2024-04-10

## 2024-04-10 NOTE — TELEPHONE ENCOUNTER
----- Message from Devika Rick RN sent at 4/10/2024  9:29 AM CDT -----  Regarding: FW: F/U CD’s [facility]  Contact: [phone]      ----- Message -----  From: Elena Rea  Sent: 4/9/2024   5:04 PM CDT  To: Em Triage Support  Subject: F/U CD’s [facility]                            I contacted [employer] and they said that Dr. Ramso,’s office can go on their website and pull up a medical release form. Fill it out and fax it to them. I do not need to sign it as long as it comes directly from the practice. they will then get the cd’s  burned and sent over to you.  Thank you

## 2024-04-15 ENCOUNTER — TELEPHONE (OUTPATIENT)
Dept: INTERNAL MEDICINE CLINIC | Facility: CLINIC | Age: 64
End: 2024-04-15

## 2024-04-15 DIAGNOSIS — R91.8 ABNORMAL CT SCAN OF LUNG: Primary | ICD-10-CM

## 2024-04-15 NOTE — TELEPHONE ENCOUNTER
CT of the chest shows changes consistent with more possible emphysema asthma.  Would recommend following up with pulmonary doctor.  May need some other testing through lung doctor.  Also CT shows moderate plaque in the arteries of the heart.  Would recommend stress test.  Orders written.

## 2024-04-15 NOTE — TELEPHONE ENCOUNTER
Patient was notified via [employer] --> see message    RN Triage - please check if patient read [employer] message, if not please make attempt to call

## 2024-04-18 ENCOUNTER — PATIENT MESSAGE (OUTPATIENT)
Dept: INTERNAL MEDICINE CLINIC | Facility: CLINIC | Age: 64
End: 2024-04-18

## 2024-04-18 NOTE — TELEPHONE ENCOUNTER
From: Elena Rea  To: Destini Ramos  Sent: 4/18/2024 10:14 AM CDT  Subject: Images    Hi Dr. Ramos-  I just spoke with Lupis at [facility]. She said that she has not received any request for images from your practice. She did say that you can call them and they will give you access to the images over the phone. I am also requesting a Hopefully you can get those images today or tomorrow and then we can make a plan after they have been viewed by you and a pulmonologist. Thank you for your help.   Elena

## 2024-04-18 NOTE — TELEPHONE ENCOUNTER
I have specifically told the patient that she needs to go and  the films.  And she needs to sign a release.  And then she can drop it here so I can put into the system.  I have never said that I was going to call them and ask for the films.  I cannot do that she needs to sign a consent to release the films.

## 2024-04-18 NOTE — TELEPHONE ENCOUNTER
OFFICE STAFF= were you able to request for the CD/Imaging from [facility] ? See recent message from the patient, and update .thanks.     See orders call 4/10/24 note below;  April 10, 2024  Olga Lidia Kennedy     AS    4/10/24  9:57 AM  Note  I will get the form from website to request imaging cd      Khadijah Reynolds RN     SS    4/10/24  9:48 AM  Note  ----- Message from Devika Rick RN sent at 4/10/2024  9:29 AM CDT -----  Regarding: FW: F/U CD’s [facility]  Contact: [phone]        ----- Message -----  From: Elena Rea  Sent: 4/9/2024   5:04 PM CDT  To: Em Triage Support  Subject: F/U CD[facility]s [facility]                             I contacted [email or web address] and they said that Dr. Ramos,’s office can go on their website and pull up a medical release form. Fill it out and fax it to them. I do not need to sign it as long as it comes directly from the practice. they will then get the cd’s  burned and sent over to you.  Thank you

## 2024-04-22 NOTE — TELEPHONE ENCOUNTER
Spoke with patient. Patient states she is going to [facility] tomorrow for another appointment and she will  everything while she is there.

## 2024-04-23 ENCOUNTER — TELEPHONE (OUTPATIENT)
Dept: INTERNAL MEDICINE CLINIC | Facility: CLINIC | Age: 64
End: 2024-04-23

## 2024-04-23 ENCOUNTER — PATIENT MESSAGE (OUTPATIENT)
Dept: INTERNAL MEDICINE CLINIC | Facility: CLINIC | Age: 64
End: 2024-04-23

## 2024-04-23 NOTE — TELEPHONE ENCOUNTER
Spoke with patient, (  Name and  verified ) informed of Dr. Ramos's  instructions below    Patient states she does not drink, smoke , follows Mediterranean  diet, walk/rides about 4 miles per day     Patient asking anything else she can do for the fatty liver?    Patient is also concerned with Pulmonary status ( emphysema/ asthma, )  as she cannot see pulmonary until September, can the images that are  her possession be viewed ?     ( Patient will get the Alpha -1Antirypsin serum level done as ordered  at [facility])      Future Appointments   Date Time Provider Department Center   2024  8:00 AM Fabrice Angel MD [facility]O [facility]   6/15/2024 10:00 AM Destini Ramos MD [id number] [place] [id number]   2024  8:20 AM [facility]   2024  4:00 PM Darío Lira MD [id number] [facility]   2024 12:30 PM Destini Ramos MD [id number] [place] [address]         Please advise and thank you.

## 2024-04-23 NOTE — TELEPHONE ENCOUNTER
Ultrasound of the abdomen complete shows liver gallbladder and bile duct looks okay.  Except for fatty liver.  With weight loss that will help.  Kidneys look okay pancreas spleen and blood vessels look okay.

## 2024-04-23 NOTE — TELEPHONE ENCOUNTER
Received call from [facility] regarding requesting images.  Images have to be requested in writing via fax or an on line portal which would be emailed to provider.  Doctor info would need to be provided along with email address.  An account would be created for Dr. Ramos to access.  Please advise how you would like to proceed.      Fax [phone]

## 2024-04-23 NOTE — TELEPHONE ENCOUNTER
I am not sure why they are having to do this usually what happens is the patient will request the images and she will go and pick it up and sign a consent.  Is a simple is that.  I am not sure why I have to go through different ways of getting the images.  Patient was supposed to  the images the films after they made a copy for her

## 2024-04-24 NOTE — TELEPHONE ENCOUNTER
From: Elena Rea  To: Destini Ramos  Sent: 4/23/2024 1:35 PM CDT  Subject: CD’s/Images    Hi Dr. Ramos-  I have the cd’s- images in my possession. What are next steps to get them downloaded into the portal?   I do not want to leave them with anyone as I want them with me as I go to see the specialists.   I can bring them to your office tonight around 6:30 but need to know they can be downloaded to my chart while I wait for them.   Please advise.   Thank you.

## 2024-04-24 NOTE — TELEPHONE ENCOUNTER
Advised patient of Dr. Ramos's note and also gave patient number to medical records. Patient verbalized understanding.

## 2024-04-24 NOTE — TELEPHONE ENCOUNTER
Just have her hold onto the CD in case she needs to bring it to any specialist.  But in the meantime if she wants to bring me the CDs I will bring it to the hospital to put it into our system here for future.

## 2024-04-24 NOTE — TELEPHONE ENCOUNTER
Treatments and lifestyle changes may include:  Losing weight.  Medication to reduce cholesterol or triglycerides.  Medication to reduce blood pressure.  Medication to control diabetes.  Limiting OTC drugs.  Avoiding alcohol.  Seeing a liver specialist.  The only way to review the CDs that she has is have them put into the system at [place].  Unfortunately what we have here in the office does not seem to be working.  We can put her on a wait list for the pulmonologist since she is not having much symptoms if she wants to do that with their group and if they get an opening they can they can see her sooner.

## 2024-04-24 NOTE — TELEPHONE ENCOUNTER
Advised patient of Dr. Rmaos's note. Patient verbalized understanding and already on the waiting list for pulmonary.     See other telephone encounter from today.

## 2024-05-07 RX ORDER — ATORVASTATIN CALCIUM 20 MG/1
20 TABLET, FILM COATED ORAL EVERY EVENING
Qty: 90 TABLET | Refills: 3 | Status: SHIPPED | OUTPATIENT
Start: 2024-05-07

## 2024-05-07 NOTE — TELEPHONE ENCOUNTER
Refill Per Protocol     Requested Prescriptions   Pending Prescriptions Disp Refills    ATORVASTATIN 20 MG Oral Tab [Pharmacy Med Name: ATORVASTATIN 20MG TABLETS] 90 tablet 1     Sig: TAKE 1 TABLET(20 MG) BY MOUTH EVERY EVENING       Cholesterol Medication Protocol Passed - 5/5/2024  6:58 AM        Passed - ALT < 80     Lab Results   Component Value Date    ALT 22 03/16/2024             Passed - ALT resulted within past year        Passed - Lipid panel within past 12 months     Lab Results   Component Value Date    CHOLEST 187 03/16/2024    TRIG 114 03/16/2024    HDL 66 03/16/2024     (H) 03/16/2024    VLDL 15 07/09/2022    TCHDLRATIO 2.8 03/16/2024    NONHDLC 121 03/16/2024             Passed - In person appointment or virtual visit in the past 12 mos or appointment in next 3 mos     Recent Outpatient Visits              [facility]    [facility] Visit    4 months ago Lichen sclerosus of female genitalia    [facility] Jacqueline Anna APRN    Office Visit    8 months ago Encounter for gynecological examination without abnormal finding    [facility]chuy Nava MD    Office Visit    8 months ago Hypertension, benign    [facility]    [facility]    9 months ago COVID-19    [facility] Hedy Atkinson MD    Telemedicine          Future Appointments         Provider Department Appt Notes    In 1 month Fabrice Angel MD [facility] posibble kidney stone    In 1 month Destini Ramos MD [facility] All test results.    In 3 months Mary[facility]uy Nava MD [facility] annual    In 4  months Darío Lira MD [facility] Abnormal CT scan of lung, mask policy advised    In 4 months Destini Ramos MD [facility] last px 8-15-23                           Future Appointments         Provider Department Appt Notes    In 1 month Fabrice Angel MD [facility] posibble kidney stone    In 1 month Destini Ramos MD [facility] All test results.    In 3 months Marychuy Nava MD [facility] annual    In 4 months Darío Lira MD [facility] Abnormal CT scan of lung, mask policy advised    In 4 months Destini Ramos MD [facility] last px 8-15-23          Recent Outpatient Visits              2 months ago Hyperglycemia    [facility] Destini Ramos MD    Office Visit    4 months ago Lichen sclerosus of female genitalia    [facility] Jacqueline Anna APRN    Office Visit    8 months ago Encounter for gynecological examination without abnormal finding    [facility] Marychuy Nava MD    Office Visit    8 months ago Hypertension, benign    [facility] Destini Ramos MD    Office Visit    9 months ago COVID-19    [facility] Hedy Atkinson MD    Telemedicine

## 2024-06-05 ENCOUNTER — PATIENT MESSAGE (OUTPATIENT)
Dept: INTERNAL MEDICINE CLINIC | Facility: CLINIC | Age: 64
End: 2024-06-05

## 2024-06-06 ENCOUNTER — TELEPHONE (OUTPATIENT)
Dept: PULMONOLOGY | Facility: CLINIC | Age: 64
End: 2024-06-06

## 2024-06-06 ENCOUNTER — PATIENT MESSAGE (OUTPATIENT)
Dept: INTERNAL MEDICINE CLINIC | Facility: CLINIC | Age: 64
End: 2024-06-06

## 2024-06-06 RX ORDER — ATENOLOL 25 MG/1
TABLET ORAL
Qty: 180 TABLET | Refills: 1 | OUTPATIENT
Start: 2024-06-06

## 2024-06-06 NOTE — TELEPHONE ENCOUNTER
Regarding:   Contact: [phone]  ----- Message from Kym Albarado RN sent at 6/6/2024  7:34 AM CDT -----       ----- Message sent from Kym Albarado RN to Elena Rea at 6/6/2024  7:34 AM -----   Elena,   Your message has been forwarded to Dr Ramos and pulmonology.   Take care,   Kym CLARK RN  Office hours:   Monday through Friday   8am to 4:30pm      ----- Message -----       From:Elena Rea       Sent:6/5/2024  6:40 PM CDT         To:Destini Ramos    Subject:    Hi Dr. Ramos-  Now that all of my images are downloaded into PACS can you please reach out to Dr. Lira to view my x-ray? I cannot get into see him until September. It’s a long time to wait to get further information on the radiologists findings.   Thank you,  Elena

## 2024-06-06 NOTE — TELEPHONE ENCOUNTER
From: Elena Rea  To: Destini Ramos  Sent: 6/6/2024  5:10 PM CDT  Subject: Refill Atenolol    Hi-  The system would not allow me to choose a refill request for atenolol. Please send a refill to the [facility] pharmacy in my chart.  Thank you.

## 2024-06-06 NOTE — TELEPHONE ENCOUNTER
From: Elena Rea  To: Destini Ramos  Sent: 6/6/2024 5:10 PM CDT  Subject: Refill Atenolol    Hi-  The system would not allow me to choose a refill request for atenolol. Please send a refill to the [place] pharmacy in my chart.  Thank you.

## 2024-06-06 NOTE — TELEPHONE ENCOUNTER
Pulmonary Providers - Please see patient's CT Chest scan 4/19/24. Patient has consult appointment scheduled in September, 2024.  Does she need to be seen earlier? If so, when should she be added to schedule?

## 2024-06-06 NOTE — TELEPHONE ENCOUNTER
From: Elena Rea  To: Destini Ramos  Sent: 6/5/2024 6:40 PM CDT  Subject:     Hi Dr. Ramos-  Now that all of my images are downloaded into PACS can you please reach out to Dr. Lira to view my x-ray? I cannot get into see him until September. It’s a long time to wait to get further information on the radiologists findings.   Thank you,  Elena

## 2024-06-07 RX ORDER — TRIAMTERENE AND HYDROCHLOROTHIAZIDE 37.5; 25 MG/1; MG/1
TABLET ORAL
Qty: 90 TABLET | Refills: 1 | Status: SHIPPED | OUTPATIENT
Start: 2024-06-07

## 2024-06-07 RX ORDER — ATENOLOL 25 MG/1
25 TABLET ORAL EVERY 12 HOURS
Qty: 180 TABLET | Refills: 3 | Status: SHIPPED | OUTPATIENT
Start: 2024-06-07

## 2024-06-07 NOTE — TELEPHONE ENCOUNTER
[facility]    Requested Prescriptions   Pending Prescriptions Disp Refills    atenolol 25 MG Oral Tab 180 tablet 1     Sig: TAKE 1 TABLET BY MOUTH EVERY 12 HOURS       Hypertension Medications Protocol Passed - 6/6/2024  6:50 PM        Passed - CMP or BMP in past 12 months        Passed - Last BP reading less than 140/90     BP Readings from Last 1 Encounters:   03/05/24 129/71               Passed - In person appointment or virtual visit in the past 12 mos or appointment in next 3 mos     Recent Outpatient Visits              3 months ago Hyperglycemia    [facility] Destini Ramos MD    Office Visit    5 months ago Lichen sclerosus of female genitalia    [facility] Jacqueline Anna APRN    Office Visit    9 months ago Encounter for gynecological examination without abnormal finding    [facility] Marychuy Nava MD    Office Visit    9 months ago Hypertension, benign    [facility] Destini Ramos MD    Office Visit    10 months ago COVID-19    [facility] Hedy Atkinson MD    Telemedicine          Future Appointments         Provider Department Appt Notes    In 3 days Fabrice Angel MD [facility] posibble kidney stone    In 1 week Destini Ramos MD [facility] All test results.    In 2 months Marychuy Nava MD [facility] annual    In 3 months Darío Lira MD [facility] Abnormal CT scan of lung, mask policy advised    In 3 months Destini Ramos MD [facility] last px 8-15-23                    Passed - EGFRCR or  GFRNAA > 50     GFR Evaluation  EGFRCR: 81 , resulted on 3/16/2024

## 2024-06-07 NOTE — TELEPHONE ENCOUNTER
Spoke with patient, informed her of Dr. Chin's message, patient verbalized understanding, states she needs later appointment time, she will keep appointment with Dr. Lira in September.

## 2024-06-10 ENCOUNTER — OFFICE VISIT (OUTPATIENT)
Dept: SURGERY | Facility: CLINIC | Age: 64
End: 2024-06-10
Payer: COMMERCIAL

## 2024-06-10 VITALS
HEART RATE: 54 BPM | WEIGHT: 145 LBS | SYSTOLIC BLOOD PRESSURE: 164 MMHG | HEIGHT: 64 IN | DIASTOLIC BLOOD PRESSURE: 90 MMHG | BODY MASS INDEX: 24.75 KG/M2

## 2024-06-10 DIAGNOSIS — N20.0 KIDNEY STONE ON RIGHT SIDE: Primary | ICD-10-CM

## 2024-06-10 NOTE — PROGRESS NOTES
[facility]  Initial Office Consultation    HPI:   Elena Rea is a 64 year old female here today for consultation at the request of, and a copy of this note will be sent to, Destini Ramos MD.    1. Right Kidney Stone   Patient had a retroperitoneal ultrasound performed at [facility] 3/30/24 for evaluation of left flank pain.  This revealed a nonobstructing 0.3 cm right renal stone.    Patient denies right flank pain, nausea or vomiting, gross hematuria or dysuria.    She denies previous history of nephrolithiasis.    No history of recurrent UTIs.      SOCIAL HISTORY: Patient is single.  She has 1  daughter and 1 alive.  She denies smoking or illicit drug use.  No alcohol.  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    Past Medical History:    Bowen's disease    Hyperthyroidism    Screen for colon cancer    repeat CLN in 10  years    Squamous cell cancer of scalp and skin of neck    Chest. Dr. Stanley at [facility]. S/P Moh's.      Past Surgical History:   Procedure Laterality Date          X 2.     Bobby biopsy stereo nodule 1 site left (cpt=19081)       Family History   Adopted: Yes   Problem Relation Age of Onset    Heart Disorder Father     Lipids Paternal Aunt     Heart Disorder Paternal Aunt     Lipids Paternal Aunt     Heart Disorder Paternal Aunt      Allergies: Kiwi extract, Latex, and Watermelon      REVIEW OF SYSTEMS:  Pertinent positives and negatives per HPI. A 12-point ROS was performed and is otherwise negative.       EXAM:  BP (!) 164/90 (BP Location: Left arm, Patient Position: Sitting, Cuff Size: adult)   Pulse 54   Ht 5' 4\" (1.626 m)   Wt 145 lb (65.8 kg)   BMI 24.89 kg/m²     Physical Exam  Constitutional:       General: She is not in acute distress.     Appearance: She is well-developed.   HENT:      Head: Normocephalic.   Eyes:      General: No scleral icterus.  Cardiovascular:       Rate and Rhythm: Normal rate.   Pulmonary:      Effort: Pulmonary effort is normal.   Skin:     General: Skin is warm and dry.   Neurological:      Mental Status: She is alert and oriented to person, place, and time.   Psychiatric:         Mood and Affect: Mood normal.         Behavior: Behavior normal.       LABS:  No results found.      IMAGING:    Report of retroperitoneal ultrasound performed at [facility] on 3/30/2024 revealing a 3 mm nonobstructing right kidney stone.  PVR 0 mL.      IMPRESSION:  64 year old female with a 3 mm asymptomatic, nonobstructing right kidney stone noted on outside ultrasound performed 3/30/2024.    Findings reviewed with patient at length.  We discussed relevant anatomy and physiology.    Different stone management options were discussed including watchful waiting, ESWL, or ureteroscopy.     Rationale and approach as well as benefits, risks, possible complications and reasonable alternatives of each treatment were discussed with the patient.  Stone clearance rates were discussed as well as the expected postoperative course of recovery.     Given small size and asymptomatic nature of this right kidney stone, I recommend conservative management.  Discussed high rate of stone passage with medical expulsive therapy if the stone descends into the ureter.      Patient verbalized understanding and is agreeable. All questions were answered.      PLAN:  Conservative management of asymptomatic, nonobstructing, 3 mm right kidney stone.    Signs and symptoms requiring presentation to the ER were discussed with the patient.    She will return to see us for follow-up as needed.    Fabrice Angel MD  6/10/2024

## 2024-06-20 NOTE — TELEPHONE ENCOUNTER
Dr. Ramos, please see below, pulmonary staff did reach out to providers for review of results and earlier appointment. Per Dr. Chin after review of CT report results are not urgent. Earlier appointment offered, but patient declined. Any other recommendations? Thank you.    Please see telephone encounter 6/6/24:  Brigitte Gomez, RN  6/7/24 12:05 PM Note     Spoke with patient, informed her of Dr. Chin's message, patient verbalized understanding, states she needs later appointment time, she will keep appointment with Dr. Lira in September.        Dashawn Chin,   6/6/24 11:02 AM Note     I saw the recent CT report.  Not urgent but okay to double book with me sometime end of June

## 2024-06-25 RX ORDER — TRIAMTERENE AND HYDROCHLOROTHIAZIDE 37.5; 25 MG/1; MG/1
TABLET ORAL
Qty: 90 TABLET | Refills: 1 | OUTPATIENT
Start: 2024-06-25

## 2024-08-05 ENCOUNTER — NURSE TRIAGE (OUTPATIENT)
Dept: INTERNAL MEDICINE CLINIC | Facility: CLINIC | Age: 64
End: 2024-08-05

## 2024-08-05 DIAGNOSIS — N64.4 BREAST PAIN: Primary | ICD-10-CM

## 2024-08-05 NOTE — TELEPHONE ENCOUNTER
Informed patient (verified name and date of birth ).   Requested to fax the order to [facility], fax number [phone].  She is scheduled to see Dr Ramos on 09/17/24, the earliest and available appointment .   Instructed to call us as soon as she is done with the mammogram and we will send an appointment request to Dr Ramos to discuss the mammogram results, stated understanding .       Future Appointments   Date Time Provider Department Center   8/29/2024  8:20 AM Marychuy Nava MD [facility]   9/9/2024  4:00 PM Darío Lira MD [facility]   9/17/2024 12:30 PM Destini Ramos MD [id number] [facility] [address]     Diagnostic bilateral Mammogram order faxed today via RIGHT fax .   Chart Review Routing History    Recipients Sent On Sent By Routed Reports    [facility]    8/5/2024 11:17 AM JEROMY Whitt CORNEL 2D+3D DIAGNOSTIC DEQUAN  BILAT (CPT=77066/65460) ([id number])

## 2024-08-05 NOTE — TELEPHONE ENCOUNTER
Patient has read [facility] message.       diagnostic mammogram order  Message [id number]  From  Mary Beth Almanza RN To  Elena Rea Sent and Delivered  8/5/2024 11:23 AM   Last Read in [facility]  8/5/2024 11:35 AM by Elena Rea

## 2024-08-05 NOTE — TELEPHONE ENCOUNTER
Action Requested: Summary for Provider     []  Critical Lab, Recommendations Needed  [x] Need Additional Advice  []   FYI    [x]   Need Orders  [] Need Medications Sent to Pharmacy  []  Other     SUMMARY: Per protocol, patient should be seen in office within 2 weeks. She is requesting mammogram/ultrasound order.     Reason for call: Breast Pain  Onset: Data Unavailable    Spoke to patient. She is having left breast and armpit discomfort for the past three weeks. It is sensitive to touch and feels a little tender and swollen. There is no rash or redness. No palpable lumps. No nipple discharge or inversion. No skin appearance changes/dimpling.     Her daughter did pass away in November from breast cancer.     Patient did have a biopsy in this area of breast about 5 years ago.     She is requesting mammogram/ultrasound orders.    Future Appointments   Date Time Provider Department Center   8/29/2024  8:20 AM Marychuy Nava MD [facility]   9/9/2024  4:00 PM Darío Lira MD [facility]   9/17/2024 12:30 PM Destini Ramos MD [id number] [place] [id number]     Dr. Ramos, please advise. Pended bilateral diagnostic mammogram with ultrasound/additional views boxes checked.     Triage- see part of [facility] message below for order instructions:    Can you please fax an order to [facility] in [place] for me? (fax- [phone])    Reason for Disposition   Breast pain and cause is not known    Protocols used: Breast Symptoms-A-[place]

## 2024-08-05 NOTE — TELEPHONE ENCOUNTER
Put in orders to do right away.  And then I would have her make a acute visit with me just for that issue.  After the mammogram.

## 2024-08-16 ENCOUNTER — HOSPITAL ENCOUNTER (OUTPATIENT)
Dept: MAMMOGRAPHY | Facility: HOSPITAL | Age: 64
Discharge: HOME | End: 2024-08-16
Attending: INTERNAL MEDICINE
Payer: COMMERCIAL

## 2024-08-16 DIAGNOSIS — N64.4 BREAST PAIN: ICD-10-CM

## 2024-08-16 PROCEDURE — 77062 BREAST TOMOSYNTHESIS BI: CPT | Performed by: INTERNAL MEDICINE

## 2024-08-16 PROCEDURE — 77066 DX MAMMO INCL CAD BI: CPT | Performed by: INTERNAL MEDICINE

## 2024-08-29 ENCOUNTER — OFFICE VISIT (OUTPATIENT)
Dept: OBGYN CLINIC | Facility: CLINIC | Age: 64
End: 2024-08-29
Payer: COMMERCIAL

## 2024-08-29 VITALS
BODY MASS INDEX: 25.13 KG/M2 | HEIGHT: 64 IN | WEIGHT: 147.19 LBS | DIASTOLIC BLOOD PRESSURE: 78 MMHG | SYSTOLIC BLOOD PRESSURE: 150 MMHG | HEART RATE: 49 BPM

## 2024-08-29 DIAGNOSIS — Z80.3 FH: BREAST CANCER IN FIRST DEGREE RELATIVE WHEN <50 YEARS OLD: ICD-10-CM

## 2024-08-29 DIAGNOSIS — Z01.419 ENCOUNTER FOR GYNECOLOGICAL EXAMINATION WITHOUT ABNORMAL FINDING: Primary | ICD-10-CM

## 2024-08-29 DIAGNOSIS — N90.4 LICHEN SCLEROSUS OF VULVA: ICD-10-CM

## 2024-08-29 PROCEDURE — 3008F BODY MASS INDEX DOCD: CPT | Performed by: OBSTETRICS & GYNECOLOGY

## 2024-08-29 PROCEDURE — 99396 PREV VISIT EST AGE 40-64: CPT | Performed by: OBSTETRICS & GYNECOLOGY

## 2024-08-29 PROCEDURE — 3078F DIAST BP <80 MM HG: CPT | Performed by: OBSTETRICS & GYNECOLOGY

## 2024-08-29 PROCEDURE — 3077F SYST BP >= 140 MM HG: CPT | Performed by: OBSTETRICS & GYNECOLOGY

## 2024-08-29 RX ORDER — CLOBETASOL PROPIONATE 0.5 MG/G
1 OINTMENT TOPICAL 2 TIMES DAILY
Qty: 30 G | Refills: 3 | Status: SHIPPED | OUTPATIENT
Start: 2024-08-29

## 2024-08-29 NOTE — PROGRESS NOTES
Elena Rea is a 64 year old female  No LMP recorded. Patient is postmenopausal. who presents for   Chief Complaint   Patient presents with    Gyn Exam     Annual exam. Had diagnostic mammogram in August. Having an intermittent \"burning sensation\" at left breast. Daughter passed in November to breast cancer.   .  She has no gyne complaints.  Her 36 yo daughter passed away last year from breast cancer.  They did not do genetic screening- I encouraged her to see Dr Ordaz and consider screening.      OBSTETRICS HISTORY:  OB History    Para Term  AB Living   2 2 2 0 0 2   SAB IAB Ectopic Multiple Live Births   0 0 0 0 2       GYNE HISTORY:        MEDICAL HISTORY:  Past Medical History:    Bowen's disease    Cancer (HCC)    Squamous cell/ basal cell    Genital herpes simplex    Hyperlipidemia    Hypertension    Hyperthyroidism    Screen for colon cancer    repeat CLN in 10  years    Squamous cell cancer of scalp and skin of neck    Chest. Dr. Stanley at [facility]. S/P Moh's.        SURGICAL HISTORY:  Past Surgical History:   Procedure Laterality Date          X 2.     Colposcopy, cervix w/upper adjacent vagina; w/biopsy(s), cervix      See Dr. Bhandari notes    Bobby biopsy stereo nodule 1 site left (cpt=19081)         SOCIAL HISTORY:  Social History     Socioeconomic History    Marital status:    Occupational History    Occupation: Hosp. admin. retired.     Occupation: Runs .     Comment: Self-employed.   Tobacco Use    Smoking status: Never    Smokeless tobacco: Never   Vaping Use    Vaping status: Never Used   Substance and Sexual Activity    Alcohol use: No    Drug use: Never    Sexual activity: Not Currently   Other Topics Concern    Caffeine Concern No         FAMILY HISTORY:  Family History   Adopted: Yes   Problem Relation Age of Onset    Heart Disorder Father     Breast Cancer Daughter 36        passed at 37    Lipids Paternal Aunt     Heart Disorder Paternal Aunt      Lipids Paternal Aunt     Heart Disorder Paternal Aunt        MEDICATIONS:    Current Outpatient Medications:     clobetasol 0.05 % External Ointment, Apply 1 Application topically 2 (two) times daily., Disp: 30 g, Rfl: 3    atenolol 25 MG Oral Tab, Take 1 tablet (25 mg total) by mouth every 12 (twelve) hours., Disp: 180 tablet, Rfl: 3    Triamterene-HCTZ 37.5-25 MG Oral Tab, TAKE 1/4 TABLET BY MOUTH EVERY MORNING, Disp: 90 tablet, Rfl: 1    atorvastatin 20 MG Oral Tab, Take 1 tablet (20 mg total) by mouth every evening., Disp: 90 tablet, Rfl: 3    valACYclovir (VALTREX) 500 MG Oral Tab, Take 1 tablet (500 mg total) by mouth 2 (two) times daily. For 3-5 days as needed, Disp: 30 tablet, Rfl: 2    ALLERGIES:    Allergies   Allergen Reactions    Kiwi Extract     Latex ITCHING and RASH    Watermelon          Review of Systems:  Constitutional:  Denies fatigue, night sweats, hot flashes  Eyes:  denies blurred or double vision  Cardiovascular:  denies chest pain or palpitations  Respiratory:  denies shortness of breath  Gastrointestinal:  denies heartburn, abdominal pain, diarrhea or constipation  Genitourinary:  denies dysuria, incontinence, abnormal vaginal discharge, vaginal itching  Musculoskeletal:  denies back pain.  Skin/Breast:  Denies any breast pain, lumps, or discharge.   Neurological:  denies headaches, extremity weakness or numbness.  Psychiatric: denies depression or anxiety.  Endocrine:   denies excessive thirst or urination.  Heme/Lymph:  denies history of anemia, easy bruising or bleeding.    Depression Screening (PHQ-2/PHQ-9): Over the LAST 2 WEEKS   Little interest or pleasure in doing things (over the last two weeks)?: Not at all    Feeling down, depressed, or hopeless (over the last two weeks)?: Not at all    PHQ-2 SCORE: 0         Blood pressure 150/78, pulse (!) 49, height 5' 4\" (1.626 m), weight 147 lb 3.2 oz (66.8 kg), not currently breastfeeding.    PHYSICAL EXAM:   Constitutional: well  developed, well nourished  Head/Face: normocephalic  Neck/Thyroid: thyroid symmetric, no thyromegaly, no nodules, no adenopathy  Lymphatic:no abnormal supraclavicular or axillary adenopathy is noted  Breast: normal without palpable masses, tenderness, asymmetry, nipple discharge, nipple retraction or skin changes  Respiratory:  lungs clear to auscultation bilaterally  Cardiovascular: regular rate and rhythm, no significant murmur  Abdomen:  soft, nontender, nondistended, no masses  Skin/Hair: no unusual rashes or bruises  Extremities: no edema, no cyanosis  Psychiatric:  Oriented to time, place, person and situation. Appropriate mood and affect    Pelvic Exam:  External Genitalia: + known lichenoid changes  Urethral Meatus:  normal in size, location, without lesions and prolapse  Bladder:  No fullness, masses or tenderness  Vagina:  Normal appearance without lesions, no abnormal discharge  Cervix:  Normal without tenderness on motion  Uterus: normal in size, contour, position, mobility, without tenderness  Adnexa: normal without masses or tenderness  Perineum: normal  Rectovaginal: no masses, normal tone  Anus: no thrombosed or ulcerated hemorroids    Assessment & Plan:   ASCCP guidelines discussed,cotest done,mammogram ordered,rtc 1 year for annual exam   SBE encouraged  Referrred to high risk beat clinic  Encounter Diagnoses   Name Primary?    Encounter for gynecological examination without abnormal finding Yes    FH: breast cancer in first degree relative when <50 years old     Lichen sclerosus of vulva      No orders of the defined types were placed in this encounter.    Requested Prescriptions     Signed Prescriptions Disp Refills    clobetasol 0.05 % External Ointment 30 g 3     Sig: Apply 1 Application topically 2 (two) times daily.     None

## 2024-08-31 ENCOUNTER — PATIENT MESSAGE (OUTPATIENT)
Dept: OBGYN CLINIC | Facility: CLINIC | Age: 64
End: 2024-08-31

## 2024-08-31 NOTE — TELEPHONE ENCOUNTER
From: Elena Rea  To: Marychuy Nava  Sent: 8/31/2024 12:14 PM CDT  Subject: Triamcinolone    Hi Dr. Nava,  We had discussed a refill prescription for Triamcinolone Acetonide Cream and Clobetasol was submitted for refill instead.  Can you please send a script for Triamcinolone please.   Thank you,  Elena

## 2024-08-31 NOTE — TELEPHONE ENCOUNTER
Pt saw [facility] for annual on 8/29/24. Pt stated that clobetasol was sent to pharmacy, but needed triamcinolone instead. Message to [facility].

## 2024-09-01 NOTE — TELEPHONE ENCOUNTER
Please apologize to the patient for me but I use clobetasol ointment instead because creams tend to contain more alcohol in them than ointments.  If she wants the triamcinolone cream then please send 30 gm tube with 1 refill.  thanks

## 2024-09-03 RX ORDER — TRIAMCINOLONE ACETONIDE 1 MG/G
CREAM TOPICAL 2 TIMES DAILY
Qty: 28.4 G | Refills: 1 | Status: SHIPPED | OUTPATIENT
Start: 2024-09-03

## 2024-09-09 ENCOUNTER — TELEPHONE (OUTPATIENT)
Dept: PULMONOLOGY | Facility: CLINIC | Age: 64
End: 2024-09-09

## 2024-09-09 ENCOUNTER — OFFICE VISIT (OUTPATIENT)
Dept: PULMONOLOGY | Facility: CLINIC | Age: 64
End: 2024-09-09
Payer: COMMERCIAL

## 2024-09-09 VITALS
SYSTOLIC BLOOD PRESSURE: 130 MMHG | BODY MASS INDEX: 25.1 KG/M2 | HEIGHT: 64 IN | RESPIRATION RATE: 14 BRPM | OXYGEN SATURATION: 98 % | HEART RATE: 66 BPM | WEIGHT: 147 LBS | DIASTOLIC BLOOD PRESSURE: 76 MMHG

## 2024-09-09 DIAGNOSIS — R93.89 ABNORMAL CT OF THE CHEST: Primary | ICD-10-CM

## 2024-09-09 PROCEDURE — 3078F DIAST BP <80 MM HG: CPT | Performed by: INTERNAL MEDICINE

## 2024-09-09 PROCEDURE — 3008F BODY MASS INDEX DOCD: CPT | Performed by: INTERNAL MEDICINE

## 2024-09-09 PROCEDURE — 99243 OFF/OP CNSLTJ NEW/EST LOW 30: CPT | Performed by: INTERNAL MEDICINE

## 2024-09-09 PROCEDURE — 3075F SYST BP GE 130 - 139MM HG: CPT | Performed by: INTERNAL MEDICINE

## 2024-09-09 NOTE — PROGRESS NOTES
Dear Destini:           As you know, Elena is a 64-year-old female who I am now evaluating for abnormal CT scan of the chest.       HISTORY OF PRESENT ILLNESS: The patient had subxiphoid chest discomfort several months ago which has subsequently improved but in the meantime she had undergone CT imaging which demonstrated stable small benign-appearing nodule at the minor fissure which had not changed in appearance compared to 2022.  There also was subtle apical capping.  The patient had significant secondhand smoke exposure as a child as her father smoked 3 packs of 1 stents per day.  She denies shortness of breath, chest pain, mopped assist, pleurisy, personal nor family history of deep venous thrombosis, TB exposure, cough, phlegm.    PAST MEDICAL AND SURGICAL HISTORY:   1.  Dyslipidemia hypertension squamous cell carcinoma of the skin status post Mohs surgery    SOCIAL HISTORY: Single, 2 children, 1 , no tobacco, rare alcohol, healthcare  and  provider    FAMILY HISTORY: Mother is alive and well, father alive status post CABG.  The patient was adopted although she had found her biological parents.    ALLERGIES TO MEDICATIONS: Latex watermelon kiwi    MEDICATIONS: Atenolol atorvastatin triamterene hydrochlorothiazide Valtrex    REVIEW OF SYSTEMS: Review of Systems:  Vision normal. Ear nose and throat normal. Bowel normal. Bladder function normal. No depression. No thyroid disease. No lymphatic system concerns.  No rash. Muscles and joints unremarkable. No weight loss no weight gain.    PHYSICAL EXAMINATION: Physical Examination:  Vital signs normal. HEENT examination is unremarkable with pupils equal round and reactive to light and accommodation. Neck without adenopathy, thyromegaly, JVD nor bruit. Lungs clear to auscultation and percussion. Cardiac regular rate and rhythm no murmur. Abdomen nontender, without hepatosplenomegaly and no mass appreciable. Extremities and Musculoskeletal  without clubbing cyanosis nor edema, and mobility acceptable. Neurologic grossly intact with symmetric tone and strength and reflex.    LABORATORY: CT scan of the chest April 9, 2024-subtle apical capping and stable small benign-appearing nodule at the minor fissure unchanged from April 2022.    ASSESSMENT AND PLAN:  PROBLEM 1.  Abnormal CT scan of the chest-the patient has had secondhand smoke exposure; however, she is otherwise a lifetime non-smoker.  The radiographic findings represent apical capping and benign scarring from inhalation of dust product likely as a child.  I do not think any further scanning is indicated.  Primarily I am offering reassurance.    RECOMMENDATIONS:  1.  Reassurance  2.  Annual flu shot.  COVID booster.  RSV vaccine.  3.  Patient to follow-up as needed and contact me promptly if there is any new respiratory problems.    I am delighted to assist in Elena's care.            With warmest regards,     Darío Lira MD  Medical Director, Critical Care, [facility]  Medical Director, [facility]

## 2024-09-09 NOTE — TELEPHONE ENCOUNTER
Dr Lira requesting patient bring discs of images to appointment today. Spoke to patient and she states she brought images to medical records to be scanned into PACS system.    Dr Lira- images should be in PACS

## 2024-09-17 ENCOUNTER — OFFICE VISIT (OUTPATIENT)
Dept: INTERNAL MEDICINE CLINIC | Facility: CLINIC | Age: 64
End: 2024-09-17
Payer: COMMERCIAL

## 2024-09-17 VITALS
WEIGHT: 148.19 LBS | SYSTOLIC BLOOD PRESSURE: 136 MMHG | HEIGHT: 64 IN | TEMPERATURE: 98 F | DIASTOLIC BLOOD PRESSURE: 69 MMHG | HEART RATE: 57 BPM | BODY MASS INDEX: 25.3 KG/M2 | OXYGEN SATURATION: 100 %

## 2024-09-17 DIAGNOSIS — I10 HYPERTENSION, BENIGN: ICD-10-CM

## 2024-09-17 DIAGNOSIS — R31.21 ASYMPTOMATIC MICROSCOPIC HEMATURIA: ICD-10-CM

## 2024-09-17 DIAGNOSIS — R93.89 ABNORMAL CT OF THE CHEST: Primary | ICD-10-CM

## 2024-09-17 DIAGNOSIS — N95.2 ATROPHIC VAGINITIS: ICD-10-CM

## 2024-09-17 DIAGNOSIS — E53.8 VITAMIN B 12 DEFICIENCY: ICD-10-CM

## 2024-09-17 DIAGNOSIS — R13.19 OTHER DYSPHAGIA: ICD-10-CM

## 2024-09-17 DIAGNOSIS — Z12.4 PAP SMEAR FOR CERVICAL CANCER SCREENING: ICD-10-CM

## 2024-09-17 DIAGNOSIS — Z12.31 ENCOUNTER FOR SCREENING MAMMOGRAM FOR MALIGNANT NEOPLASM OF BREAST: ICD-10-CM

## 2024-09-17 DIAGNOSIS — D04.9 BOWEN'S DISEASE: ICD-10-CM

## 2024-09-17 DIAGNOSIS — R20.0 NUMBNESS AND TINGLING OF BOTH LEGS: ICD-10-CM

## 2024-09-17 DIAGNOSIS — Z00.00 ADULT GENERAL MEDICAL EXAM: ICD-10-CM

## 2024-09-17 DIAGNOSIS — R20.2 NUMBNESS AND TINGLING OF BOTH LEGS: ICD-10-CM

## 2024-09-17 DIAGNOSIS — E87.6 HYPOKALEMIA: ICD-10-CM

## 2024-09-17 DIAGNOSIS — R73.9 HYPERGLYCEMIA: ICD-10-CM

## 2024-09-17 DIAGNOSIS — M85.89 OSTEOPENIA OF MULTIPLE SITES: ICD-10-CM

## 2024-09-17 DIAGNOSIS — R06.83 SNORING: ICD-10-CM

## 2024-09-17 DIAGNOSIS — H35.342 MACULAR HOLE OF LEFT EYE: ICD-10-CM

## 2024-09-17 DIAGNOSIS — H25.13 NUCLEAR SCLEROSIS OF BOTH EYES: ICD-10-CM

## 2024-09-17 DIAGNOSIS — K21.9 GASTROESOPHAGEAL REFLUX DISEASE WITHOUT ESOPHAGITIS: ICD-10-CM

## 2024-09-17 DIAGNOSIS — E78.00 HYPERCHOLESTEROLEMIA: ICD-10-CM

## 2024-09-17 DIAGNOSIS — Z12.11 COLON CANCER SCREENING: ICD-10-CM

## 2024-09-17 PROBLEM — B97.7 HPV (HUMAN PAPILLOMA VIRUS) INFECTION: Status: RESOLVED | Noted: 2019-03-04 | Resolved: 2024-09-17

## 2024-09-17 PROBLEM — N87.0 CERVICAL INTRAEPITHELIAL NEOPLASIA GRADE 1: Status: RESOLVED | Noted: 2023-03-21 | Resolved: 2024-09-17

## 2024-09-17 PROBLEM — N87.9 CERVICAL DYSPLASIA: Status: RESOLVED | Noted: 2019-03-04 | Resolved: 2024-09-17

## 2024-09-17 LAB
APPEARANCE: CLEAR
BILIRUBIN: NEGATIVE
GLUCOSE (URINE DIPSTICK): NEGATIVE MG/DL
KETONES (URINE DIPSTICK): NEGATIVE MG/DL
MULTISTIX LOT#: ABNORMAL NUMERIC
NITRITE, URINE: NEGATIVE
PH, URINE: 7 (ref 4.5–8)
PROTEIN (URINE DIPSTICK): NEGATIVE MG/DL
SPECIFIC GRAVITY: 1.02 (ref 1–1.03)
URINE-COLOR: YELLOW
UROBILINOGEN,SEMI-QN: 0.2 MG/DL (ref 0–1.9)

## 2024-09-17 PROCEDURE — 81003 URINALYSIS AUTO W/O SCOPE: CPT | Performed by: INTERNAL MEDICINE

## 2024-09-17 PROCEDURE — 3078F DIAST BP <80 MM HG: CPT | Performed by: INTERNAL MEDICINE

## 2024-09-17 PROCEDURE — 99215 OFFICE O/P EST HI 40 MIN: CPT | Performed by: INTERNAL MEDICINE

## 2024-09-17 PROCEDURE — 3075F SYST BP GE 130 - 139MM HG: CPT | Performed by: INTERNAL MEDICINE

## 2024-09-17 PROCEDURE — 99396 PREV VISIT EST AGE 40-64: CPT | Performed by: INTERNAL MEDICINE

## 2024-09-17 PROCEDURE — 3008F BODY MASS INDEX DOCD: CPT | Performed by: INTERNAL MEDICINE

## 2024-09-17 RX ORDER — ATORVASTATIN CALCIUM 20 MG/1
20 TABLET, FILM COATED ORAL EVERY EVENING
Qty: 90 TABLET | Refills: 3 | Status: SHIPPED | OUTPATIENT
Start: 2024-09-17

## 2024-09-17 RX ORDER — TRIAMTERENE/HYDROCHLOROTHIAZID 37.5-25 MG
TABLET ORAL
Qty: 90 TABLET | Refills: 3 | Status: SHIPPED | OUTPATIENT
Start: 2024-09-17

## 2024-09-17 RX ORDER — OMEPRAZOLE 40 MG/1
40 CAPSULE, DELAYED RELEASE ORAL DAILY
Qty: 30 CAPSULE | Refills: 3 | Status: SHIPPED | OUTPATIENT
Start: 2024-09-17

## 2024-09-17 NOTE — PROGRESS NOTES
HPI:    Patient ID: Elena Rea is a 64 year old female.    Elena Rea is a 64 year old female who presents for a complete physical exam.   HPI:     Chief Complaint   Patient presents with    Physical     Patient states she is here for an Annual Physical Examination.       [employer] covered but thru designated [facility].     No LMP recorded. Patient is postmenopausal.     /69 (BP Location: Right arm, Patient Position: Sitting, Cuff Size: adult)   Pulse 57   Temp 98.2 °F (36.8 °C) (Oral)   Ht 5' 4\" (1.626 m)   Wt 148 lb 3.2 oz (67.2 kg)   SpO2 100%   BMI 25.44 kg/m²    Wt Readings from Last 4 Encounters:   09/17/24 148 lb 3.2 oz (67.2 kg)   09/09/24 147 lb (66.7 kg)   08/29/24 147 lb 3.2 oz (66.8 kg)   06/10/24 145 lb (65.8 kg)     Body mass index is 25.44 kg/m².     Labs:   Lab Results   Component Value Date/Time    WBC 8.0 04/22/2022 08:11 PM    HGB 15.1 04/22/2022 08:11 PM    .0 04/22/2022 08:11 PM      Lab Results   Component Value Date/Time    GLU 93 03/16/2024 07:54 AM     03/16/2024 07:54 AM    K 3.9 03/16/2024 07:54 AM     03/16/2024 07:54 AM    CO2 29 03/16/2024 07:54 AM    CREATSERUM 0.81 03/16/2024 07:54 AM    CA 9.9 03/16/2024 07:54 AM    ALB 4.6 03/16/2024 07:54 AM    TP 7.1 03/16/2024 07:54 AM    ALKPHO 91 03/16/2024 07:54 AM    AST 21 03/16/2024 07:54 AM    ALT 22 03/16/2024 07:54 AM    BILT 1.1 03/16/2024 07:54 AM    TSH 1.460 03/24/2022 09:26 AM        Lab Results   Component Value Date/Time    CHOLEST 187 03/16/2024 07:54 AM    HDL 66 03/16/2024 07:54 AM    TRIG 114 03/16/2024 07:54 AM     (H) 03/16/2024 07:54 AM    NONHDLC 121 03/16/2024 07:54 AM       Lab Results   Component Value Date/Time    A1C 5.4 08/08/2023 03:04 PM      Lab Results   Component Value Date    VITD 30 03/16/2024         Health Maintenance   Topic Date Due    Mammogram  08/16/2025       Current Outpatient Medications   Medication Sig Dispense Refill    atorvastatin 20 MG Oral Tab Take 1  tablet (20 mg total) by mouth every evening. 90 tablet 3    Omeprazole 40 MG Oral Capsule Delayed Release Take 1 capsule (40 mg total) by mouth daily. 30 capsule 3    Triamterene-HCTZ 37.5-25 MG Oral Tab TAKE 1/2 TABLET BY MOUTH EVERY MORNING 90 tablet 3    triamcinolone 0.1 % External Cream Apply topically 2 (two) times daily. 28.4 g 1    clobetasol 0.05 % External Ointment Apply 1 Application topically 2 (two) times daily. 30 g 3    atenolol 25 MG Oral Tab Take 1 tablet (25 mg total) by mouth every 12 (twelve) hours. 180 tablet 3    valACYclovir (VALTREX) 500 MG Oral Tab Take 1 tablet (500 mg total) by mouth 2 (two) times daily. For 3-5 days as needed 30 tablet 2      Past Medical History:    Bowen's disease    Cancer (HCC)    Squamous cell/ basal cell    Genital herpes simplex    Hyperlipidemia    Hypertension    Hyperthyroidism    Screen for colon cancer    repeat CLN in 10  years    Squamous cell cancer of scalp and skin of neck    Chest. Dr. Stanley at [facility]. S/P Moh's.       Past Surgical History:   Procedure Laterality Date          X 2.     Colposcopy, cervix w/upper adjacent vagina; w/biopsy(s), cervix      See Dr. Bhandari notes    Bobby biopsy stereo nodule 1 site left (cpt=19081)        Family History   Adopted: Yes   Problem Relation Age of Onset    Heart Disorder Father     Breast Cancer Daughter 36        passed at 37    Lipids Paternal Aunt     Heart Disorder Paternal Aunt     Lipids Paternal Aunt     Heart Disorder Paternal Aunt       Social History:  Social History     Socioeconomic History    Marital status:    Occupational History    Occupation: Hosp. admin. retired.     Occupation: Runs .     Comment: Self-employed.   Tobacco Use    Smoking status: Never     Passive exposure: Past    Smokeless tobacco: Never   Vaping Use    Vaping status: Never Used   Substance and Sexual Activity    Alcohol use: No    Drug use: Never    Sexual activity: Not Currently   Other Topics  Concern    Caffeine Concern No         OB History    Para Term  AB Living   2 2 2 0 0 2   SAB IAB Ectopic Multiple Live Births   0 0 0 0 2        Hx of Pap: previous hx of abnl. Pap           Labs:   Lab Results   Component Value Date/Time    GLU 93 2024 07:54 AM     2024 07:54 AM    K 3.9 2024 07:54 AM     2024 07:54 AM    CO2 29 2024 07:54 AM    CREATSERUM 0.81 2024 07:54 AM    CA 9.9 2024 07:54 AM    AST 21 2024 07:54 AM    ALT 22 2024 07:54 AM    TSH 1.460 2022 09:26 AM        Lab Results   Component Value Date/Time    CHOLEST 187 2024 07:54 AM    HDL 66 2024 07:54 AM    TRIG 114 2024 07:54 AM     (H) 2024 07:54 AM    NONHDLC 121 2024 07:54 AM           Saw Dr. Nava and referred to Dr. Ordaz.     Saw ophthalmologist recently.  Had surgery.  But developed cataracts.  Now needs cataract surgery.  Has to schedule.    Saw GYN Dr. Nava recently and recommended to see breast surgeon.  Had breast exam and pelvic exam done through GYN.    See sees dermatologist every 6 months.  Has an appointment for this coming Saturday in 4 days.  Due to history of skin cancer.  Has not noticed any new areas.      Gastro-esophageal Reflux  She complains of dysphagia and heartburn. She reports no abdominal pain, no belching, no chest pain, no choking, no coughing, no early satiety, no globus sensation, no hoarse voice, no nausea, no sore throat, no stridor, no tooth decay, no water brash or no wheezing. This is a recurrent problem. The current episode started more than 1 month ago. The problem occurs occasionally. The problem has been waxing and waning. The heartburn duration is less than a minute. The heartburn is located in the substernum. The heartburn is of moderate intensity. The heartburn does not wake her from sleep. The heartburn does not limit her activity. The heartburn doesn't change with position. The  symptoms are aggravated by certain foods (Likes pasta with sauce.  Also has noticed with dark chocolate.  Tries to avoid but occasionally has to have a piece of chocolate.). Associated symptoms include fatigue. Risk factors include hiatal hernia. She has tried a PPI for the symptoms. Past procedures include an EGD (Had EGD done in 2022 biopsies did not show Helicobacter pylori.). Past procedures do not include an abdominal ultrasound, esophageal manometry, esophageal pH monitoring, H. pylori antibody titer or a UGI. Past invasive treatments do not include gastroplasty, gastroplication or reflux surgery.         Review of Systems   Constitutional:  Positive for fatigue. Negative for activity change, appetite change, chills, diaphoresis, fever and unexpected weight change.   HENT:  Positive for trouble swallowing. Negative for congestion, dental problem, drooling, ear discharge, ear pain, facial swelling, hearing loss, hoarse voice, mouth sores, nosebleeds, postnasal drip, rhinorrhea, sinus pressure, sinus pain, sneezing, sore throat, tinnitus and voice change.    Eyes:  Negative for photophobia, pain, discharge, redness, itching and visual disturbance.   Respiratory:  Negative for apnea, cough, choking, chest tightness, shortness of breath, wheezing and stridor.    Cardiovascular:  Negative for chest pain, palpitations and leg swelling.   Gastrointestinal:  Positive for dysphagia and heartburn. Negative for abdominal distention, abdominal pain, anal bleeding, blood in stool, constipation, diarrhea, nausea, rectal pain and vomiting.   Endocrine: Negative for cold intolerance, heat intolerance, polydipsia, polyphagia and polyuria.   Genitourinary:  Negative for decreased urine volume, difficulty urinating, dysuria, flank pain, frequency, hematuria, menstrual problem, pelvic pain, urgency, vaginal bleeding, vaginal discharge and vaginal pain.   Skin:  Negative for rash.   Neurological:  Positive for numbness. Negative  for dizziness, tremors, seizures, syncope, facial asymmetry, speech difficulty, weakness, light-headedness and headaches.   Psychiatric/Behavioral:  Positive for sleep disturbance. Negative for agitation, behavioral problems, confusion, decreased concentration, dysphoric mood, hallucinations, self-injury and suicidal ideas. The patient is not nervous/anxious and is not hyperactive.         Goes to bed and able to fall asleep.  Awakens x 1 to urinate.  Then hard to get back to sleep but eventually does get back to sleep.  Was told by granddaughter that does snore very loudly.  Can hear from the other room.  Not sure about apneic episodes.  Patient states unrefreshing sleep.   All other systems reviewed and are negative.        Current Outpatient Medications   Medication Sig Dispense Refill    atorvastatin 20 MG Oral Tab Take 1 tablet (20 mg total) by mouth every evening. 90 tablet 3    Omeprazole 40 MG Oral Capsule Delayed Release Take 1 capsule (40 mg total) by mouth daily. 30 capsule 3    Triamterene-HCTZ 37.5-25 MG Oral Tab TAKE 1/2 TABLET BY MOUTH EVERY MORNING 90 tablet 3    triamcinolone 0.1 % External Cream Apply topically 2 (two) times daily. 28.4 g 1    clobetasol 0.05 % External Ointment Apply 1 Application topically 2 (two) times daily. 30 g 3    atenolol 25 MG Oral Tab Take 1 tablet (25 mg total) by mouth every 12 (twelve) hours. 180 tablet 3    valACYclovir (VALTREX) 500 MG Oral Tab Take 1 tablet (500 mg total) by mouth 2 (two) times daily. For 3-5 days as needed 30 tablet 2     Allergies:  Allergies   Allergen Reactions    Kiwi Extract     Latex ITCHING and RASH    Watermelon        HISTORY:  Past Medical History:    Bowen's disease    Cancer (HCC)    Squamous cell/ basal cell    Genital herpes simplex    Hyperlipidemia    Hypertension    Hyperthyroidism    Screen for colon cancer    repeat CLN in 10  years    Squamous cell cancer of scalp and skin of neck    Chest. Dr. Stanley at [facility]. S/P Moh's.        Past Surgical History:   Procedure Laterality Date          X 2.     Colposcopy, cervix w/upper adjacent vagina; w/biopsy(s), cervix      See Dr. 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   Tobacco Use    Smoking status: Never     Passive exposure: Past    Smokeless tobacco: Never   Vaping Use    Vaping status: Never Used   Substance and Sexual Activity    Alcohol use: No    Drug use: Never    Sexual activity: Not Currently   Other Topics Concern    Caffeine Concern No        Hypertension  Patient is here for follow up of hypertension. BP at home: 130-140/70's. Arm cuff.  New machine. Checks at bedtime. Has been compliant with medications.  Has been taking a quarter of the hydrochlorothiazide triamterene but not able to always do a quarter due to sometimes breaks in half for Gretz crumbles.  And then running out sooner.  Wondering about taking a half a dose?  Exercise level: exercises 4 times a  week  ( pickle ball qSunday and walks at bedtime X 35 minutes and up stairs) and has been following low salt diet.  Weight has been stable. Cooks more and occ.  added salt.   Wt Readings from Last 3 Encounters:   24 148 lb 3.2 oz (67.2 kg)   24 147 lb (66.7 kg)   24 147 lb 3.2 oz (66.8 kg)     BP Readings from Last 3 Encounters:   24 136/69   24 130/76   24 150/78     Labs:   Lab Results   Component Value Date/Time    GLU 93 2024 07:54 AM     2024 07:54 AM    K 3.9  03/16/2024 07:54 AM     03/16/2024 07:54 AM    CO2 29 03/16/2024 07:54 AM    CREATSERUM 0.81 03/16/2024 07:54 AM    CA 9.9 03/16/2024 07:54 AM    AST 21 03/16/2024 07:54 AM    ALT 22 03/16/2024 07:54 AM    TSH 1.460 03/24/2022 09:26 AM        Lab Results   Component Value Date/Time    CHOLEST 187 03/16/2024 07:54 AM    HDL 66 03/16/2024 07:54 AM    TRIG 114 03/16/2024 07:54 AM     (H) 03/16/2024 07:54 AM    NONHDLC 121 03/16/2024 07:54 AM          Wt Readings from Last 3 Encounters:   09/17/24 148 lb 3.2 oz (67.2 kg)   09/09/24 147 lb (66.7 kg)   08/29/24 147 lb 3.2 oz (66.8 kg)     BP Readings from Last 3 Encounters:   09/17/24 136/69   09/09/24 130/76   08/29/24 150/78       Labs:   Lab Results   Component Value Date/Time    GLU 93 03/16/2024 07:54 AM     03/16/2024 07:54 AM    K 3.9 03/16/2024 07:54 AM     03/16/2024 07:54 AM    CO2 29 03/16/2024 07:54 AM    CREATSERUM 0.81 03/16/2024 07:54 AM    CA 9.9 03/16/2024 07:54 AM    AST 21 03/16/2024 07:54 AM    ALT 22 03/16/2024 07:54 AM    TSH 1.460 03/24/2022 09:26 AM        Lab Results   Component Value Date/Time    CHOLEST 187 03/16/2024 07:54 AM    HDL 66 03/16/2024 07:54 AM    TRIG 114 03/16/2024 07:54 AM     (H) 03/16/2024 07:54 AM    NONHDLC 121 03/16/2024 07:54 AM          PHYSICAL EXAM:   /69 (BP Location: Right arm, Patient Position: Sitting, Cuff Size: adult)   Pulse 57   Temp 98.2 °F (36.8 °C) (Oral)   Ht 5' 4\" (1.626 m)   Wt 148 lb 3.2 oz (67.2 kg)   SpO2 100%   BMI 25.44 kg/m²   BP Readings from Last 3 Encounters:   09/17/24 136/69   09/09/24 130/76   08/29/24 150/78     Wt Readings from Last 3 Encounters:   09/17/24 148 lb 3.2 oz (67.2 kg)   09/09/24 147 lb (66.7 kg)   08/29/24 147 lb 3.2 oz (66.8 kg)       Physical Exam  Vitals and nursing note reviewed.   Constitutional:       General: She is not in acute distress.     Appearance: Normal appearance. She is well-developed and well-groomed. She is not  ill-appearing, toxic-appearing or diaphoretic.      Interventions: She is not intubated.  HENT:      Head: Normocephalic and atraumatic.      Right Ear: Tympanic membrane, ear canal and external ear normal. No decreased hearing noted. No laceration, drainage, swelling or tenderness. No middle ear effusion. There is no impacted cerumen. No foreign body. No mastoid tenderness. No PE tube. No hemotympanum. Tympanic membrane is not injected, scarred, perforated, erythematous, retracted or bulging. Tympanic membrane has normal mobility.      Left Ear: Tympanic membrane, ear canal and external ear normal. No decreased hearing noted. No laceration, drainage, swelling or tenderness.  No middle ear effusion. There is no impacted cerumen. No foreign body. No mastoid tenderness. No PE tube. No hemotympanum. Tympanic membrane is not injected, scarred, perforated, erythematous, retracted or bulging. Tympanic membrane has normal mobility.      Nose:      Right Sinus: No maxillary sinus tenderness or frontal sinus tenderness.      Left Sinus: No maxillary sinus tenderness or frontal sinus tenderness.      Mouth/Throat:      Lips: Pink. No lesions.      Mouth: Mucous membranes are moist. No injury, lacerations, oral lesions or angioedema.      Dentition: No dental tenderness, gingival swelling, dental abscesses or gum lesions.      Tongue: No lesions. Tongue does not deviate from midline.      Palate: No mass and lesions.      Pharynx: Oropharynx is clear. Uvula midline. No pharyngeal swelling, oropharyngeal exudate, posterior oropharyngeal erythema or uvula swelling.      Tonsils: No tonsillar exudate or tonsillar abscesses.   Eyes:      General: Lids are normal. Gaze aligned appropriately. No scleral icterus.        Right eye: No foreign body, discharge or hordeolum.         Left eye: No foreign body, discharge or hordeolum.      Extraocular Movements: Extraocular movements intact.      Right eye: Normal extraocular motion and no  nystagmus.      Left eye: Normal extraocular motion and no nystagmus.      Conjunctiva/sclera: Conjunctivae normal.      Right eye: Right conjunctiva is not injected. No chemosis, exudate or hemorrhage.     Left eye: Left conjunctiva is not injected. No chemosis, exudate or hemorrhage.     Pupils: Pupils are equal, round, and reactive to light.   Neck:      Thyroid: No thyroid mass, thyromegaly or thyroid tenderness.      Vascular: Normal carotid pulses. No carotid bruit, hepatojugular reflux or JVD.      Trachea: Trachea and phonation normal. No tracheal tenderness, tracheostomy, abnormal tracheal secretions or tracheal deviation.   Cardiovascular:      Rate and Rhythm: Normal rate and regular rhythm.      Pulses: Normal pulses.           Carotid pulses are 2+ on the right side and 2+ on the left side.       Radial pulses are 2+ on the right side and 2+ on the left side.        Dorsalis pedis pulses are 2+ on the right side and 2+ on the left side.        Posterior tibial pulses are 2+ on the right side and 2+ on the left side.      Heart sounds: Normal heart sounds, S1 normal and S2 normal.   Pulmonary:      Effort: Pulmonary effort is normal. No tachypnea, bradypnea, accessory muscle usage, prolonged expiration, respiratory distress or retractions. She is not intubated.      Breath sounds: Normal breath sounds and air entry. No stridor, decreased air movement or transmitted upper airway sounds. No decreased breath sounds, wheezing, rhonchi or rales.   Chest:      Chest wall: No tenderness.      Comments: Breast exam deferred.  Patient had through GYN and will be seeing breast surgeon.  Abdominal:      General: Bowel sounds are normal. There is no distension.      Palpations: Abdomen is soft. Abdomen is not rigid. There is no fluid wave, hepatomegaly, splenomegaly or mass.      Tenderness: There is no abdominal tenderness. There is no right CVA tenderness, left CVA tenderness, guarding or rebound.    Musculoskeletal:      Cervical back: Neck supple. No tenderness.      Lumbar back: Negative right straight leg raise test and negative left straight leg raise test.      Right hip: No deformity, lacerations, tenderness, bony tenderness or crepitus. Normal range of motion. Normal strength.      Left hip: No deformity, lacerations, tenderness, bony tenderness or crepitus. Normal range of motion. Normal strength.      Right lower leg: No edema.      Left lower leg: No edema.   Lymphadenopathy:      Head:      Right side of head: No submental, submandibular, preauricular, posterior auricular or occipital adenopathy.      Left side of head: No submental, submandibular, preauricular, posterior auricular or occipital adenopathy.      Cervical: No cervical adenopathy.      Right cervical: No superficial, deep or posterior cervical adenopathy.     Left cervical: No superficial, deep or posterior cervical adenopathy.      Upper Body:      Right upper body: No supraclavicular adenopathy.      Left upper body: No supraclavicular adenopathy.   Skin:     General: Skin is warm and dry.      Coloration: Skin is not pale.      Findings: No erythema or rash.   Neurological:      Mental Status: She is alert and oriented to person, place, and time.   Psychiatric:         Mood and Affect: Mood normal.         Speech: Speech normal.         Behavior: Behavior normal. Behavior is cooperative.              ASSESSMENT/PLAN:     Encounter Diagnoses   Name Primary?    Abnormal CT of the chest   Yes    Adult general medical exam Check urine.        Atrophic vaginitis Stable.        Bowen's disease Stable.        Encounter for screening mammogram for malignant neoplasm of breast     Colon cancer screening Normal mammogram. Continue self breast exam every month.         Gastroesophageal reflux disease without esophagitis Careful with diet. Avoid hot spicy foods and caffeine and caffinated beverages. Careful with acidic foods. Elevate head of  bed. Wait 2 hrs. after eating before going to bed to allow digestion. Avoid caffeinated or carbonated beverages, fried foods, spicy foods or highly acidic foods (citrus, onions, tomatoes, etc  -don’t lay down 20-30 minutes after eating or drinking  -use wedge pillow under mattress or use cinder blocks to elevate head of bed-this will prevent reflux at night  -take medications as directed  -quit smoking if applicable  -call if symptoms do not improve within 2-3 days or if symptoms worsen  Follow up with Gastroenterologist  Try omeprazole 20 mg 30 minutes prebreakfast.  Discussed about side effects and use.         Hypercholesterolemia Check blood.        Hyperglycemia Check blood.        Hypertension, benign Higher.  Increase Dyazide to half a tablet.  Monitor blood pressures call in 2 weeks. Careful with diet and excercise at least 30 minutes 3-4 times a week. Check blood pressures at different times on different days. Can purchase own blood pressure monitor. If not, check at local pharmacy. Bake foods more and grill occasionally. Avoid fried foods. No salt. Use other seasonings.         Hypokalemia Check blood.        Macular hole of left eye     Nuclear sclerosis of both eyes stable.  No new vision changes.  Follow-up with ophthalmology.       Vitamin B 12 deficiency Check blood.        Pap smear for cervical cancer screening up-to-date with screening.  Follows up with GYN Dr. Nava.       Osteopenia of multiple sites Take calcium 600 every 12 hrs. With vitamin D 400 IU every  12 hrs. Excerise at least 30 minutes 3-4 times a week. May use calcium citrate as opposed to calcium carbonate which may be better absorbed in the setting of PPI use.  The preferred calcium supplement for people at risk of stone formation is calcium citrate because it helps to increase urinary citrate excretion. We recommend a dose of 200-400 mg if dietary calcium cannot be increased.   lifelong physical activity at all ages is strongly  endorsed by the National Osteoporosis Foundation. Exercise recommendations generally should include weight-bearing, muscle-strengthening, and balance training exercises for 30 minutes 5 days per week or 75 minutes twice weekly, often consistent with other general health recommendations.   Weight Bearing  There are two types of osteoporosis exercises that are important for building and maintaining bone density: weight-bearing and muscle-strengthening exercises.  Weight-bearing Exercises  These exercises include activities that make you move against gravity while staying upright. Weight-bearing exercises can be high-impact or low-impact.  High-impact weight-bearing exercises help build bones and keep them strong. If you have broken a bone due to osteoporosis or are at risk of breaking a bone, you may need to avoid high-impact exercises. If you’re not sure, you should check with your healthcare provider.  Examples of high-impact weight-bearing exercises are:  Dancing   Doing high-impact aerobics   Hiking   Jogging/running   Jumping Rope   Stair climbing   Tennis  Low-impact weight-bearing exercises can also help keep bones strong and are a safe alternative if you cannot do high-impact exercises. Examples of low-impact weight-bearing exercises are:  Using elliptical training machines   Doing low-impact aerobics   Using stair-step machines   Fast walking on a treadmill or outside          Other dysphagia persistent.  Was on omeprazole did not see relief.  History of hiatal hernia.  Had endoscopy done in 2022.  Follow-up with GI.       Numbness and tingling of both legs lateral thighs.  Persistent for several years per patient.  Questionable meralgia paresthetica.  Does not wear tight close.  Offered physical therapy.  On hold now due to timing difficulties with work.  Exercises given.  Hold medications.      Patient lives home alone but granddaughter noticed snoring from the other room.  Not sure about apnea.  Patient  does state about unrefreshing sleep.  Will check home sleep study due to the fact cannot do sleep study outside the home due to the fact not able to sleep.    Vaccine counseling.  Recommend shingles vaccine.  Has to get through designated [facility] patient has a list at home.  Through insurance.  Olding on flu and COVID per patient.    Orders Placed This Encounter   Procedures    Vitamin B12    Hemoglobin A1C    URINALYSIS, AUTO, W/O SCOPE    Lipid Panel    Comp Metabolic Panel (14)       Meds This Visit:  Requested Prescriptions     Signed Prescriptions Disp Refills    atorvastatin 20 MG Oral Tab 90 tablet 3     Sig: Take 1 tablet (20 mg total) by mouth every evening.    Omeprazole 40 MG Oral Capsule Delayed Release 30 capsule 3     Sig: Take 1 capsule (40 mg total) by mouth daily.    Triamterene-HCTZ 37.5-25 MG Oral Tab 90 tablet 3     Sig: TAKE 1/2 TABLET BY MOUTH EVERY MORNING       Imaging & Referrals:  GASTRO - INTERNAL  SLEEP MEDICINE - EXTERNAL      Return to clinic 3 months for follow-up on blood pressure.

## 2024-09-17 NOTE — PATIENT INSTRUCTIONS
ASSESSMENT/PLAN:     Encounter Diagnoses   Name Primary?    Abnormal CT of the chest   Yes    Adult general medical exam Check urine.        Atrophic vaginitis Stable.        Bowen's disease Stable.        Encounter for screening mammogram for malignant neoplasm of breast     Colon cancer screening Normal mammogram. Continue self breast exam every month.         Gastroesophageal reflux disease without esophagitis Careful with diet. Avoid hot spicy foods and caffeine and caffinated beverages. Careful with acidic foods. Elevate head of bed. Wait 2 hrs. after eating before going to bed to allow digestion. Avoid caffeinated or carbonated beverages, fried foods, spicy foods or highly acidic foods (citrus, onions, tomatoes, etc  -don’t lay down 20-30 minutes after eating or drinking  -use wedge pillow under mattress or use cinder blocks to elevate head of bed-this will prevent reflux at night  -take medications as directed  -quit smoking if applicable  -call if symptoms do not improve within 2-3 days or if symptoms worsen  Follow up with Gastroenterologist  Try omeprazole 20 mg 30 minutes prebreakfast.  Discussed about side effects and use.         Hypercholesterolemia Check blood.        Hyperglycemia Check blood.        Hypertension, benign Higher.  Increase Dyazide to half a tablet.  Monitor blood pressures call in 2 weeks. Careful with diet and excercise at least 30 minutes 3-4 times a week. Check blood pressures at different times on different days. Can purchase own blood pressure monitor. If not, check at local pharmacy. Bake foods more and grill occasionally. Avoid fried foods. No salt. Use other seasonings.         Hypokalemia Check blood.        Macular hole of left eye     Nuclear sclerosis of both eyes stable.  No new vision changes.  Follow-up with ophthalmology.       Vitamin B 12 deficiency Check blood.        Pap smear for cervical cancer screening up-to-date with screening.  Follows up with GYN   Page.       Osteopenia of multiple sites Take calcium 600 every 12 hrs. With vitamin D 400 IU every  12 hrs. Excerise at least 30 minutes 3-4 times a week. May use calcium citrate as opposed to calcium carbonate which may be better absorbed in the setting of PPI use.  The preferred calcium supplement for people at risk of stone formation is calcium citrate because it helps to increase urinary citrate excretion. We recommend a dose of 200-400 mg if dietary calcium cannot be increased.   lifelong physical activity at all ages is strongly endorsed by the [employer]opor[employer]. Exercise recommendations generally should include weight-bearing, muscle-strengthening, and balance training exercises for 30 minutes 5 days per week or 75 minutes twice weekly, often consistent with other general health recommendations.   Weight Bearing  There are two types of osteoporosis exercises that are important for building and maintaining bone density: weight-bearing and muscle-strengthening exercises.  Weight-bearing Exercises  These exercises include activities that make you move against gravity while staying upright. Weight-bearing exercises can be high-impact or low-impact.  High-impact weight-bearing exercises help build bones and keep them strong. If you have broken a bone due to osteoporosis or are at risk of breaking a bone, you may need to avoid high-impact exercises. If you’re not sure, you should check with your healthcare provider.  Examples of high-impact weight-bearing exercises are:  Dancing   Doing high-impact aerobics   Hiking   Jogging/running   Jumping Rope   Stair climbing   Tennis  Low-impact weight-bearing exercises can also help keep bones strong and are a safe alternative if you cannot do high-impact exercises. Examples of low-impact weight-bearing exercises are:  Using elliptical training machines   Doing low-impact aerobics   Using stair-step machines   Fast walking on a treadmill or outside           Other dysphagia persistent.  Was on omeprazole did not see relief.  History of hiatal hernia.  Had endoscopy done in 2022.  Follow-up with GI.       Numbness and tingling of both legs lateral thighs.  Persistent for several years per patient.  Questionable meralgia paresthetica.  Does not wear tight close.  Offered physical therapy.  On hold now due to timing difficulties with work.  Exercises given.  Hold medications.        General Neck and Back Pain    Both neck and back pain are usually caused by injury to the muscles or ligaments of the spine. Sometimes the disks that separate each bone of the spine may cause pain by pressing on a nearby nerve. Back and neck pain may appear after a sudden twisting or bending force (such as in a car accident), or sometimes after a simple awkward movement. In either case, muscle spasm is often present and adds to the pain.  Acute neck and back pain usually gets better in 1 to 2 weeks. Pain related to disk disease, arthritis in the spinal joints or spinal stenosis (narrowing of the spinal canal) can become chronic and last for months or years.  Back and neck pain are common problems. Most people feel better in 1 or 2 weeks, and most of the rest in 1 to 2 months. Most people can remain active.  People have and describe pain differently.  Pain can be sharp, stabbing, shooting, aching, cramping, or burning  Movement, standing, bending, lifting, sitting, or walking may worsen the pain  Pain can be localized to one spot or area, or it can be more generalized  Pain can spread or radiate upwards, downwards, to the front, or go down your arms  Muscle spasm may occur.  Most of the time mechanical problems with the muscles or spine cause the pain. it is usually caused by an injury, whether known or not, to the muscles or ligaments. While illnesses can cause back pain, it is usually not caused by a serious illness. Pain is usually related to physical activity, whether sports, exercise, work,  or normal activity. Sometimes it can occur without an identifiable cause. This can happen simply by stretching or moving wrong, without noting pain at the time. Other causes include:  Overexertion, lifting, pushing, pulling incorrectly or too aggressively.  Sudden twisting, bending or stretching from an accident (car or fall), or accidental movement.  Poor posture  Poor conditioning, lack of regular exercise  Spinal disc disease or arthritis  Stress  Pregnancy, or illness like appendicitis, bladder or kidney infection, pelvic infections   Home care  For neck pain: Use a comfortable pillow that supports the head and keeps the spine in a neutral position. The position of the head should not be tilted forward or backward.  When in bed, try to find a position of comfort. A firm mattress is best. Try lying flat on your back with pillows under your knees. You can also try lying on your side with your knees bent up towards your chest and a pillow between your knees.  At first, do not try to stretch out the sore spots. If there is a strain, it is not like the good soreness you get after exercising without an injury. In this case, stretching may make it worse.  Don't sit for long periods, as in long car rides or other travel. This puts more stress on the lower back than standing or walking.  During the first 24 to 72 hours after an injury, apply an ice pack to the painful area for 20 minutes and then remove it for 20 minutes over a period of 60 to 90 minutes or several times a day.   You can alternate ice and heat therapies. Talk with your healthcare provider about the best treatment for your back or neck pain. As a safety precaution, do not use a heating pad at bedtime. Sleeping with a heating pad can lead to skin burns or tissue damage.  Therapeutic massage can help relax the back and neck muscles without stretching them.  Be aware of safe lifting methods and do not lift anything over 15 pounds until all the pain is  gone.  Medicines  Talk to your healthcare provider before using medicine, especially if you have other medical problems or are taking other medicines.  You may use over-the-counter medicine to control pain, unless another pain medicine was prescribed. If you have chronic conditions like diabetes, liver or kidney disease, stomach ulcers,  gastrointestinal bleeding, or are taking blood thinner medicines.  Be careful if you are given pain medicines, narcotics, or medicine for muscle spasm. They can cause drowsiness, and can affect your coordination, reflexes, and judgment. Do not drive or operate heavy machinery.  Follow-up care  Follow up with your healthcare provider, or as advised. Physical therapy or further tests may be needed.  If X-rays were taken, you will be notified of any new findings that may affect your care.  Call 911  Call 911 if any of the following occur:  Trouble breathing  Confusion  Very drowsy or trouble awakening  Fainting or loss of consciousness  Rapid or very slow heart rate  Loss of bowel or bladder control  When to seek medical advice  Call your healthcare provider right away if any of these occur:  Pain becomes worse or spreads into your arms or legs  Weakness, numbness or pain in one or both arms or legs  Numbness in the groin area  Difficulty walking  Fever of 100.4ºF (38ºC) or higher, or as directed by your healthcare provider  Date Last Reviewed: 7/1/2016  © 4194-2734 [employer]. [address] [place] [address], [place] [address]. All rights reserved. This information is not intended as a substitute for professional medical care. Always follow your healthcare professional's instructions.        Back Care Tips    Caring for your back  These are things you can do to prevent a recurrence of acute back pain and to reduce symptoms from chronic back pain:  Stay at a healthy weight. If you are overweight, losing weight will help most types of back pain.  Exercise is an important part of  recovery from most types of back pain. The muscles behind and in front of the spine support the back. This means strengthening both the back muscles and the abdominal muscles will provide better support for your spine.   Swimming and brisk walking are good overall exercises to improve your fitness level.  Practice safe lifting methods (see below).  Practice good posture when sitting, standing, and walking. Don't sit for a long time. This puts more stress on the lower back than standing or walking.  Wear quality shoes with good arch support. Foot and ankle alignment can affect back symptoms. Don't wear high heels.  Therapeutic massage can help relax the back muscles without stretching them.  During the first 24 to 72 hours after an acute injury or flare-up of chronic back pain, put an ice pack on the painful area for 20 minutes and then remove it for 20 minutes. Do thisover a period of 60 to 90 minutes, or several times a day. As a safety precaution, don't use a heating pad at bedtime. Sleeping on a heating pad can lead to skin burns or tissue damage.  You can alternate using ice and heat.  Medicines  Talk with your healthcare provider before using medicines, especially if you have other health problems or are taking other medicines.  You may use over-the-counter medicines, such as acetaminophen, ibuprofen, or naprosyn to control pain, unless your healthcare provider prescribed other pain medicine. Talk with your healthcare provider before taking any medicines if you have a chronic condition such ass diabetes, liver or kidney disease, stomach ulcers, or digestive bleeding, or are taking blood thinners.  Be careful if you are given prescription pain medicines, opioids, or medicine for muscle spasm. They can cause drowsiness, and affect your coordination, reflexes, and judgment. Don't drive or operate heavy machinery while taking these types of medicines. Take prescription pain medicine only as prescribed by your  healthcare provider.  Lumbar stretch  This simple stretch will help relax muscle spasm and keep your back more limber. If exercise makes your back pain worse, don’t do it.  Lie on your back with your knees bent and both feet on the ground.  Slowly raise your left knee to your chest as you flatten your lower back against the floor. Hold for 5 seconds.  Relax and repeat the exercise with your right knee.  Do 10 of these exercises for each leg.  Safe lifting method  Don’t bend over at the waist to lift an object off the floor.  Instead, bend your knees and hips in a squat.   Keep your back and head upright  Hold the object close to your body, directly in front of you.  Straighten your legs to lift the object.   Lower the object to the floor in the reverse fashion.  If you must slide something across the floor, push it.    Posture tips  Sitting  Sit in chairs with straight backs or low-back support. Keep your knees lower than your hips, with your feet flat on the floor.  When driving, sit up straight. Adjust the seat forward so you are not leaning toward the steering wheel.  A small pillow or rolled towel behind your lower back may help if you are driving long distances.   Standing  When standing for long periods, shift most of your weight to one leg at a time. Switch legs every few minutes.   Sleeping  The best way to sleep is on your side with your knees bent. Put a low pillow under your head to support your neck in a neutral spine position. Don't use thick pillows that bend your neck to one side. Put a pillow between your legs to further relax your lower back. If you sleep on your back, put pillows under your knees to support your legs in a slightly flexed position. Use a firm mattress. If your mattress sags, replace it, or use a 1/2-inch plywood board under the mattress to add support.  Follow-up care  Follow up with your healthcare provider, or as advised.  If X-rays, a CT scan or an MRI scan were taken, they may  be reviewed by a radiologist. You will be told of any new findings that may affect your care.  Call 911  Call 911 if any of the following occur:  Trouble breathing  Confusion  Very drowsy  Fainting or loss of consciousness  Rapid or very slow heart rate  Loss of  bowel or bladder control  When to seek medical advice  Call your healthcare provider right away if any of the following occur:  Pain becomes worse or spreads to your arms or legs  Weakness or numbness in one or both arms or legs  Numbness in the groin area  Date Last Reviewed: 6/1/2016  © 9008-9175 [employer]. [address] [place] [address]. All rights reserved. This information is not intended as a substitute for professional medical care. Always follow your healthcare professional's instructions.        Exercises to Strengthen Your Lower Back  Strong lower back and abdominal muscles work together to support your spine. The exercises below will help strengthen the lower back. It is important that you begin exercising slowly and increase levels gradually.  Always begin any exercise program with stretching. If you feel pain while doing any of these exercises, stop and talk to your doctor about a more specific exercise program that better suits your condition.   Low back stretch  The point of stretching is to make you more flexible and increase your range of motion. Stretch only as much as you are able. Stretch slowly. Do not push your stretch to the limit. If at any point you feel pain while stretching, this is your (temporary) limit.  Lie on your back with your knees bent and both feet on the ground.  Slowly raise your left knee to your chest as you flatten your lower back against the floor. Hold for 5 seconds.  Relax and repeat the exercise with your right knee.  Do 10 of these exercises for each leg.  Repeat hugging both knees to your chest at the same time.  Building lower back strength  Start your exercise routine with 10 to 30  minutes a day, 1 to 3 times a day.  Initial exercises  Lying on your back:  Ankle pumps: Move your foot up and down, towards your head, and then away. Repeat 10 times with each foot.  Heel slides: Slowly bend your knee, drawing the heel of your foot towards you. Then slide your heel/foot from you, straightening your knee. Do not lift your foot off the floor (this is not a leg lift).  Abdominal contraction: Bend your knees and put your hands on your stomach. Tighten your stomach muscles. Hold for 5 seconds, then relax. Repeat 10 times.  Straight leg raise: Bend one leg at the knee and keep the other leg straight. Tighten your stomach muscles. Slowly lift your straight leg 6 to 12 inches off the floor and hold for up to 5 seconds. Repeat 10 times on each side.  Standing:  Wall squats: Stand with your back against the wall. Move your feet about 12 inches away from the wall. Tighten your stomach muscles, and slowly bend your knees until they are at about a 45 degree angle. Do not go down too far. Hold about 5 seconds. Then slowly return to your starting position. Repeat 10 times.  Heel raises: Stand facing the wall. Slowly raise the heels of your feet up and down, while keeping your toes on the floor. If you have trouble balancing, you can touch the wall with your hands. Repeat 10 times.  More advanced exercises  When you feel comfortable enough, try these exercises.  Kneeling lumbar extension: Begin on your hands and knees. At the same time, raise and straighten your right arm and left leg until they are parallel to the ground. Hold for 2 seconds and come back slowly to a starting position. Repeat with left arm and right leg, alternating 10 times.  Prone lumbar extension: Lie face down, arms extended overhead, palms on the floor. At the same time, raise your right arm and left leg as high as comfortably possible. Hold for 10 seconds and slowly return to start. Repeat with left arm and right leg, alternating 10 times.  Gradually build up to 20 times. (Advanced: Repeat this exercise raising both arms and both legs a few inches off the floor at the same time. Hold for 5 seconds and release.)  Pelvic tilt: Lie on the floor on your back with your knees bent at 90 degrees. Your feet should be flat on the floor. Inhale, exhale, then slowly contract your abdominal muscles bringing your navel toward your spine. Let your pelvis rock back until your lower back is flat on the floor. Hold for 10 seconds while breathing smoothly.  Abdominal crunch: Perform a pelvic tilt (above) flattening your lower back against the floor. Holding the tension in your abdominal muscles, take another breath and raise your shoulder blades off the ground (this is not a full sit-up). Keep your head in line with your body (don’t bend your neck forward). Hold for 2 seconds, then slowly lower.  Date Last Reviewed: 6/1/2016  © 1652-0877 [employer]. [address] [place] [address]. All rights reserved. This information is not intended as a substitute for professional medical care. Always follow your healthcare professional's instructions.        Back Pain (Acute or Chronic)    Back pain is one of the most common problems. The good news is that most people feel better in 1 to 2 weeks, and most of the rest in 1 to 2 months. Most people can remain active.  People who have pain describe it differently--not everyone is the same.  The pain can be sharp, stabbing, shooting, aching, cramping or burning.  Movement, standing, bending, lifting, sitting, or walking may worsen pain.  It can be localized to one spot or area, or it can be more generalized.  It can spread or radiate upwards, to the front, or go down your arms or legs (sciatica).  It can cause muscle spasm.  Most of the time, mechanical problems with the muscles or spine cause the pain. Mechanical problems are usually caused by an injury to the muscles or ligaments. While illness can cause back  pain, it is usually not caused by a serious illness. Mechanical problems include:   Physical activity such as sports, exercise, work, or normal activity  Overexertion, lifting, pushing, pulling incorrectly or too aggressively  Sudden twisting, bending, or stretching from an accident, or accidental movement  Poor posture  Stretching or moving wrong, without noticing pain at the time  Poor coordination, lack of regular exercise (check with your doctor about this)  Spinal disc disease or arthritis  Stress  Pain can also be related to pregnancy, or illness like appendicitis, bladder or kidney infections, pelvic infections, and many other things.  Acute back pain usually gets better in 1 to 2 weeks. Back pain related to disk disease, arthritis in the spinal joints or spinal stenosis (narrowing of the spinal canal) can become chronic and last for months or years.  Unless you had a physical injury (for example, a car accident or fall) X-rays are usually not needed for the initial evaluation of back pain. If pain continues and does not respond to medical treatment, X-rays and other tests may be needed.  Home care  Try these home care recommendations:  When in bed, try to find a position of comfort. A firm mattress is best. Try lying flat on your back with pillows under your knees. You can also try lying on your side with your knees bent up towards your chest and a pillow between your knees.  At first, do not try to stretch out the sore spots. If there is a strain, it is not like the good soreness you get after exercising without an injury. In this case, stretching may make it worse.  Don't sit for long periods, as in a long car ride or during other travel. This puts more stress on the lower back than standing or walking.  During the first 24 to 72 hours after an acute injury or flare up of chronic back pain, apply an ice pack to the painful area for 20 minutes and then remove it for 20 minutes. Do this over a period of 60  to 90 minutes or several times a day. This will reduce swelling and pain. Wrap the ice pack in a thin towel or plastic to protect your skin.  You can start with ice, then switch to heat. Heat (hot shower, hot bath, or heating pad) reduces pain and works well for muscle spasms. Heat can be applied to the painful area for 20 minutes then remove it for 20 minutes. Do this over a period of 60 to 90 minutes or several times a day. Do not sleep on a heating pad. It can lead to skin burns or tissue damage.  You can alternate ice and heat therapy. Talk with your doctor about the best treatment for your back pain.  Therapeutic massage can help relax the back muscles without stretching them.  Be aware of safe lifting methods and do not lift anything without stretching first.  Medicines  Talk to your doctor before using medicine, especially if you have other medical problems or are taking other medicines.  You may use over-the-counter medicine as directed on the bottle to control pain, unless another pain medicine was prescribed. If you have chronic conditions like diabetes, liver or kidney disease, stomach ulcers, or gastrointestinal bleeding, or are taking blood thinners, talk to your doctor before taking any medicine.  Be careful if you are given a prescription medicines, narcotics, or medicine for muscle spasms. They can cause drowsiness, affect your coordination, reflexes, and judgement. Do not drive or operate heavy machinery.  Follow-up care  Follow up with your healthcare provider, or as advised.   A radiologist will review any X-rays that were taken. Your provide will notify you of any new findings that may affect your care.  Call 911  Call 911 if any of the following occur:  Trouble breathing  Confusion  Very drowsy or trouble awakening  Fainting or loss of consciousness  Rapid or very slow heart rate  Loss of bowel or bladder control  When to seek medical advice  Call your healthcare provider right away if any of  these occur:   Pain becomes worse or spreads to your legs  Weakness or numbness in one or both legs  Numbness in the groin or genital area  Date Last Reviewed: 7/1/2016  © 6261-2002 [employer]. [address] [place] [address], [place] [address]. All rights reserved. This information is not intended as a substitute for professional medical care. Always follow your healthcare professional's instructions.        Self-Care for Low Back Pain    Most people have low back pain now and then. In many cases, it isn’t serious and self-care can help. Sometimes low back pain can be a sign of a bigger problem. Call your healthcare provider if your pain returns often or gets worse over time. For the long-term care of your back, get regular exercise, lose any excess weight and learn good posture.  Take a short rest  Lying down during the day may be helpful for short periods of time if severe pain increases with sitting or standing. Long-term bed rest could be damaging.  Reduce pain and swelling  Cold reduces swelling. Both cold and heat can reduce pain. Protect your skin by placing a towel between your body and the ice or heat source.  For the first few days, apply an ice pack for 15 to 20 minutes, several times a day. To make a cold pack, put ice cubes in a plastic bag that seals at the top.  After the first few days, try heat for 15 minutes at a time to ease pain. Never sleep on a heating pad.  Over-the-counter medicine can help control pain and swelling. Try aspirin or a non-steroidal anti-inflammatory medicine (NSAID) such as ibuprofen.  Exercise  Exercise can help your back heal. It also helps your back get stronger and more flexible, preventing any reinjury. Ask your healthcare provider about specific exercises for your back.  Use good posture to avoid reinjury  When moving, bend at the hips and knees. Don’t bend at the waist or twist around.  When lifting, keep the object close to your body. Lift heavy items using your  legs, not your back. Don’t try to lift more than you can handle.  When sitting, keep your lower back supported. Use a rolled-up towel as needed.    Seek medical care right away if:  You can't stand or walk.  You have a temperature over 100.4°F (38.0°C)  You have frequent, painful, or bloody urination.  You have severe abdominal pain.  You have a sharp, stabbing pain.  Your pain is constant.  You have pain, tingling, or numbness in your leg.  You feel pain in a new area of your back.  You notice that the pain isn’t decreasing after more than a week.  Date Last Reviewed: 3/1/2018  © 2797-0173 [employer]. [address] [place] [address], [place] [address]. All rights reserved. This information is not intended as a substitute for professional medical care. Always follow your healthcare professional's instructions.        Relieving Back Pain  Back pain is a common problem. You can strain back muscles by lifting too much weight or just by moving the wrong way. Back strain can be uncomfortable, even painful. And it can take weeks or months to improve. To help yourself feel better and prevent future back strains, try these tips.  Important: Don't give aspirin to children or teens without first discussing it with your child's healthcare provider.  Ice    Ice reduces muscle pain and swelling. It helps most during the first 24 to 48 hours after an injury.  Wrap an ice pack or a bag of frozen peas in a thin towel. Never put ice directly on your skin.  Place the ice where your back hurts the most.  Don’t ice for more than 20 minutes at a time.  You can use ice several times a day.  Medicines  Over-the-counter pain relievers include acetaminophen and anti-inflammatory medicines, which includes aspirin, naproxen, or ibuprofen. They can help ease discomfort. Some also reduce swelling.  Tell your healthcare provider about any medicines you are already taking.  Take medicines only as directed.  Manipulation and massage  Having  manipulation by an osteopathic doctor or chiropractor may be helpful. Getting a massage also may help.   Heat  After the first 48 hours, heat can relax sore muscles and improve blood flow.  Try a warm bath or shower. Or use a heating pad set on low. To prevent a burn, keep a cloth between you and the heating pad.  Don’t use a heating pad for more than 15 minutes at a time. Never sleep on a heating pad.  Date Last Reviewed: 6/1/2018  © 1082-1569 [employer]. [address] [place] [address]. All rights reserved. This information is not intended as a substitute for professional medical care. Always follow your healthcare professional's instructions.        Back Safety for Healthcare Workers     Bend at your knees and hips when bending down.   Whether you’re moving a patient, lifting a box of supplies, or pushing a cart or wheelchair, your back is always working. Use the tips below to help you reduce your risk of back injury.  Reaching  Reaching for records, files, or supplies, especially in high places, can strain your back:  Reach only as high as your shoulders.  Use a stool or stepladder if you need to get closer to the load.  Test the weight of the load by pushing up on a corner before lifting. If it’s too heavy, get help.  Bending and lifting  When you’re bending down to reach or lift, move your whole body to protect your back:  Bend your knees and hips, not your back. Don't bend or lift if you are off balance or if your back is twisted.  Kneel down on 1 knee, if necessary.  Get as close to the object as you can, so you won’t have to reach with your arms.  Keep the load close to your body. “Hug” it unless there is a chance it may contain sharps.  Tighten your stomach muscles to support your back when you lift.  Lift with your legs, not your back.  Maintain a wide base of support. Keep feet shoulder-width apart, or one foot slightly in front of the other.  Pushing and pulling  Pulling larger  objects can be as hard on your back as lifting. Whenever possible, push instead:  Push with both arms, keeping your elbows bent.  Stay close to the load, without leaning forward.  Tighten your stomach muscles as you push.  Date Last Reviewed: 3/1/2018  © 1306-1537 [employer]. [address] [place] [address], [place] [address]. All rights reserved. This information is not intended as a substitute for professional medical care. Always follow your healthcare professional's instructions.        Caring for Your Back Throughout the Day    Take care of your back throughout the day. You will likely have fewer back problems if you do. Try to warm up before you move. Shift positions often. Also do your best to form healthy habits.  Warm up for the day  Do a few slow, catlike stretches before starting your day. This simple warmup can soften your disks, stretch your back muscles, and help prevent injuries.  Shift positions often  At work and at home, change positions often. This helps keep your body from getting stiff. Stand up or lean back while you sit. If you can, get up and move every 1/2 hour.  Form healthy habits  Here are some suggestions:   Keep a healthy weight. When you weigh too much, your back is under excess strain. But losing just a few extra pounds can help a lot.  Try not to overeat. Learn about serving sizes. The size of a serving depends on the food and the food group. Many foods list serving sizes on the labels.  Handle minor aches with cold and heat. Apply cold the first 24 to 48 hours. Use heat after that. Always place a thin cloth between your skin and the source of cold or heat.  Take medicines as directed. This helps keep pain under control. Always read labels, and call your healthcare provider or pharmacist if you have any questions.  Walk each day  A daily walk keeps your back and thigh muscles stretched and strong. This gives your back better support. Be sure to walk with your spine’s three  curves aligned, by keeping your head, hips, and toes connected by a vertical line.  Date Last Reviewed: 5/1/2018  © 8150-1109 [employer]. [address] [place] [address]. All rights reserved. This information is not intended as a substitute for professional medical care. Always follow your healthcare professional's instructions.        How Your Back Works  A healthy back lets you bend and stretch without pain. The spine has 3 natural curves. These keep your body balanced. Strong, flexible muscles support your spine. Soft, cushioning disks separate the hard bones of your spine. The disks let your spine bend and move.    The parts of the spine  The vertebrae are the 24 bones that make up the spine.  The spinous process is the part of each vertebra you can feel through your skin.  Each of these bones has a central hole that runs top to bottom. Together these holes form a tunnel called the spinal canal.  The lamina of each vertebra forms the back of the spinal canal.  Running through the canal are nerves. They are attached in a bundle called the spinal cord for most of the canal.  A foramen is a small opening where a spinal nerve root leaves the spinal canal.  Disks serve as cushions between vertebrae. A disk’s soft center absorbs shock during movement.     Two vertebrae and a disk     The supporting muscles  Strong, flexible muscles help maintain your 3 natural curves. They hold your spine in correct alignment. This helps support your upper body. Strong core muscles help take the strain off your back. These include the stomach, buttock, and thigh muscles.  Date Last Reviewed: 1/1/2018  © 3632-3428 [employer]. [address] [place], [place] [id number]. All rights reserved. This information is not intended as a substitute for professional medical care. Always follow your healthcare professional's instructions.        Relieving Tension in Your Back  Being relaxed helps keep your  mind healthy and your back ready to move. Take short breaks often. Walk around. Stretch. Switch tasks. Also give the following a try.  Make time to relax. Start by setting aside 5 minutes daily.  Deep breathing    Deep breathing is a simple way to reduce stress. You can do it almost any time you need to relax.  Inhale slowly through your nose. Let your lungs and stomach expand.  Hold your breath for 2 to 3 seconds.  Exhale slowly through your mouth until your lungs feel empty. Repeat 3 to 4 times.  Relieve tension  Muscle tension can create tender spots called trigger points. The tips below may help relieve muscle tension.  Press the trigger point if you can reach it. If not, lie on a soft tennis ball, or ask a friend to press the spot. Use steady pressure for 10 to 15 seconds. Breathe deeply. Repeat a few times.  Massage trigger points with ice for 2 to 5 minutes. Press lightly at first. Slowly increase firmness.  Date Last Reviewed: 5/1/2018  © 7683-3804 [employer]. [address] [place] [address]. All rights reserved. This information is not intended as a substitute for professional medical care. Always follow your healthcare professional's instructions.        Back Exercises: Lower Back Rotation    To start, lie on your back with your knees bent and feet flat on the floor. Don’t press your neck or lower back to the floor. Breathe deeply. You should feel comfortable and relaxed in this position.  Drop both knees to one side. Turn your head to the other side. Keep your shoulders flat on the floor.  Do not push through pain.  Hold for 20 seconds.  Slowly switch sides.  Repeat 2 to 5 times.  Date Last Reviewed: 3/1/2018  © 3057-3637 [employer]. [address] [place] [address]. All rights reserved. This information is not intended as a substitute for professional medical care. Always follow your healthcare professional's instructions.        Back Exercises: Lower Back  Stretch    To start, sit in a chair with your feet flat on the floor. Shift your weight slightly forward. Relax, and keep your ears, shoulders, and hips aligned while you do the following:  Sit with your feet well apart.  Bend forward and touch the floor with the backs of your hands. Relax and let your body drop.  Hold for 20 seconds. Return to starting position.  Repeat 2 times.   Date Last Reviewed: 11/1/2017  © 8805-3420 [employer]. [address] [place] [address]. All rights reserved. This information is not intended as a substitute for professional medical care. Always follow your healthcare professional's instructions.        Back Exercises: Seated Rotation    To start, sit in a chair with your feet flat on the floor. Shift your weight slightly forward to avoid rounding your back. Relax, and keep your ears, shoulders, and hips aligned:  Fold your arms and elbows just below shoulder height.  Turn from the waist with hips forward. Turn your head last. Do not push through the pain.  Hold for a count of 10 to 30. Return to starting position.  Repeat 3 to 5 times on one side. Then switch sides.  Date Last Reviewed: 3/1/2018  © 7432-2492 [employer]. [address] [place] [address]. All rights reserved. This information is not intended as a substitute for professional medical care. Always follow your healthcare professional's instructions.           Patient lives home alone but granddaughter noticed snoring from the other room.  Not sure about apnea.  Patient does state about unrefreshing sleep.  Will check home sleep study due to the fact cannot do sleep study outside the home due to the fact not able to sleep.    Vaccine counseling.  Recommend shingles vaccine.  Has to get through designated [facility] patient has a list at home.  Through insurance.  Olding on flu and COVID per patient.    Orders Placed This Encounter   Procedures    Vitamin B12    Hemoglobin A1C     URINALYSIS, AUTO, W/O SCOPE    Lipid Panel    Comp Metabolic Panel (14)       Meds This Visit:  Requested Prescriptions     Signed Prescriptions Disp Refills    atorvastatin 20 MG Oral Tab 90 tablet 3     Sig: Take 1 tablet (20 mg total) by mouth every evening.    Omeprazole 40 MG Oral Capsule Delayed Release 30 capsule 3     Sig: Take 1 capsule (40 mg total) by mouth daily.    Triamterene-HCTZ 37.5-25 MG Oral Tab 90 tablet 3     Sig: TAKE 1/2 TABLET BY MOUTH EVERY MORNING       Imaging & Referrals:  GASTRO - INTERNAL  SLEEP MEDICINE - EXTERNAL      Return to clinic 3 months for follow-up on blood pressure.

## 2024-09-18 LAB
BILIRUB UR QL: NEGATIVE
CLARITY UR: CLEAR
COLOR UR: YELLOW
GLUCOSE UR-MCNC: NEGATIVE MG/DL
HGB UR QL STRIP.AUTO: NEGATIVE
KETONES UR-MCNC: NEGATIVE MG/DL
NITRITE UR QL STRIP.AUTO: NEGATIVE
PH UR: 7 [PH] (ref 5–8)
PROT UR-MCNC: NEGATIVE MG/DL
SP GR UR STRIP: 1.01 (ref 1–1.03)
UROBILINOGEN UR STRIP-ACNC: 0.2

## 2024-09-24 ENCOUNTER — PATIENT MESSAGE (OUTPATIENT)
Dept: INTERNAL MEDICINE CLINIC | Facility: CLINIC | Age: 64
End: 2024-09-24

## 2024-09-25 NOTE — TELEPHONE ENCOUNTER
Dr Ramos please see patient message. Advised to speak with a triage nurse but patient is requesting you read her message

## 2024-09-25 NOTE — TELEPHONE ENCOUNTER
From: Elena Rea  To: Destini Ramos  Sent: 9/24/2024 12:31 PM CDT  Subject: Blood Pressure    Hi Dr. Ramos-  I wanted to follow up with you about my blood pressure from my visit last week Tuesday, September 17th. I did not change my triamtrene dosage as we discussed. I wanted to document valid readings on my blood pressure. I started documenting it the day after my appointment. My Diastolic readings are very low -in the 50s consistently. I know that we discussed imy blood pressure being high when I saw you and perhaps a couple of times that I did take it myself but now it’s showing very low. I am feeling very tired and lethargic. My systolic has not gone over 127.   Please let me know your thoughts.  Thank you, Elena

## 2024-09-27 ENCOUNTER — TELEPHONE (OUTPATIENT)
Dept: INTERNAL MEDICINE CLINIC | Facility: CLINIC | Age: 64
End: 2024-09-27

## 2024-09-27 NOTE — TELEPHONE ENCOUNTER
Patient called asking to schedule an appointment for January with . patient was asking for an appointment for a Tuesday after 5:30pm. I informed the patient that the first opening is on March 04 2024 at 5:30pm and offered that appointment for her and informed her I can add her to the wait list and she declined to accept the appointment stating \"No, she told me to call and talk to her office\". I informed patient this is the office for her and she still declined to accept the appointment.

## 2024-09-30 ENCOUNTER — PATIENT MESSAGE (OUTPATIENT)
Dept: INTERNAL MEDICINE CLINIC | Facility: CLINIC | Age: 64
End: 2024-09-30

## 2024-09-30 NOTE — TELEPHONE ENCOUNTER
Please keep message in your in basket when patient calls back we can schedule her.  Unfortunate that slot is not open.  But I am opening those days.  Esther will can put the patient in.

## 2024-09-30 NOTE — TELEPHONE ENCOUNTER
From: Elena Rea  To: Destini Ramos  Sent: 9/30/2024 7:15 AM CDT  Subject: Follow up from September visit    Hi Dr. Ramos-  I did not make my 3 month follow up appointment a couple A weeks ago when I saw you because I needed to head out. I called last week and I was told I couldn’t get appointment until March. You asked me to contact you if I had difficulty making my 3 month appointment.  Thank you,  Elena

## 2024-09-30 NOTE — TELEPHONE ENCOUNTER
Evenings get filled out very quick.  I can see her from 11-12 December 14, 2024 at [facility] for extended follow-up.

## 2024-10-01 ENCOUNTER — TELEPHONE (OUTPATIENT)
Dept: INTERNAL MEDICINE CLINIC | Facility: CLINIC | Age: 64
End: 2024-10-01

## 2024-10-01 NOTE — TELEPHONE ENCOUNTER
See previous 2 telephone encounter's.     Patient wants an appointment with Dr Ramos, no answer at site.      Please call patient at:    [phone]     Okay to leave a voicemail with options for appointment per a Tuesday after 5:30p in January.

## 2024-10-02 NOTE — TELEPHONE ENCOUNTER
Spoke to patient  she needs Tuesday after 5;30. I did offer her at 5;00 pm on 1-14-25. She will wait for new appointment

## 2024-10-02 NOTE — TELEPHONE ENCOUNTER
Spoke to patient I did offer her the 1-14-25 but she needs after 5;30. She want to be added to wait list

## 2024-10-02 NOTE — TELEPHONE ENCOUNTER
We can put her on a wait list and if we have an opening we can give her a call.  Unfortunately she is only giving us 1 day at 1 time she is not giving us any other opportunities.  We gave her Saturday she may want a Saturday appointment once the schedule opens up.

## 2024-10-13 LAB
ALBUMIN/GLOBULIN RATIO: 1.9 (CALC) (ref 1–2.5)
ALBUMIN: 4.4 G/DL (ref 3.6–5.1)
ALKALINE PHOSPHATASE: 113 U/L (ref 37–153)
ALT: 21 U/L (ref 6–29)
AST: 22 U/L (ref 10–35)
BILIRUBIN, TOTAL: 0.8 MG/DL (ref 0.2–1.2)
BUN: 14 MG/DL (ref 7–25)
CALCIUM: 9.6 MG/DL (ref 8.6–10.4)
CARBON DIOXIDE: 30 MMOL/L (ref 20–32)
CHLORIDE: 106 MMOL/L (ref 98–110)
CHOL/HDLC RATIO: 2.7 (CALC)
CHOLESTEROL, TOTAL: 167 MG/DL
CREATININE: 0.76 MG/DL (ref 0.5–1.05)
EGFR: 87 ML/MIN/1.73M2
GLOBULIN: 2.3 G/DL (CALC) (ref 1.9–3.7)
GLUCOSE: 91 MG/DL (ref 65–99)
HDL CHOLESTEROL: 62 MG/DL
HEMOGLOBIN A1C: 5.4 % OF TOTAL HGB
LDL-CHOLESTEROL: 87 MG/DL (CALC)
NON-HDL CHOLESTEROL: 105 MG/DL (CALC)
POTASSIUM: 4 MMOL/L (ref 3.5–5.3)
PROTEIN, TOTAL: 6.7 G/DL (ref 6.1–8.1)
SODIUM: 142 MMOL/L (ref 135–146)
TRIGLYCERIDES: 90 MG/DL
VITAMIN B12: 421 PG/ML (ref 200–1100)

## 2024-11-13 ENCOUNTER — OFFICE VISIT (OUTPATIENT)
Age: 64
End: 2024-11-13
Payer: COMMERCIAL

## 2024-11-13 VITALS
WEIGHT: 149.19 LBS | BODY MASS INDEX: 26 KG/M2 | SYSTOLIC BLOOD PRESSURE: 137 MMHG | RESPIRATION RATE: 16 BRPM | OXYGEN SATURATION: 96 % | HEART RATE: 56 BPM | DIASTOLIC BLOOD PRESSURE: 76 MMHG

## 2024-11-13 DIAGNOSIS — Z80.3 FAMILY HISTORY OF BREAST CANCER: Primary | ICD-10-CM

## 2024-11-13 DIAGNOSIS — Z91.89 AT HIGH RISK FOR BREAST CANCER: ICD-10-CM

## 2024-11-13 DIAGNOSIS — R92.30 DENSE BREASTS: ICD-10-CM

## 2024-11-14 PROBLEM — Z91.89 AT HIGH RISK FOR BREAST CANCER: Status: ACTIVE | Noted: 2024-11-14

## 2024-11-14 PROBLEM — R92.30 DENSE BREASTS: Status: ACTIVE | Noted: 2024-11-14

## 2024-11-14 PROBLEM — Z80.3 FAMILY HISTORY OF BREAST CANCER: Status: ACTIVE | Noted: 2024-11-14

## 2024-11-14 NOTE — PROGRESS NOTES
Breast Surgery Surveillance      History of Present Illness:   Ms. Elena Rea is a 64 year old woman who initially presented to me with imaging detected left breast concerns.  She was last seen in .  The patient denies any palpable masses, nipple discharge, skin changes or axillary symptoms.  She is adopted and therefore her family history is unknown.  She presented for her screening in 2019 and was found to have a concern related to the symmetry in the left breast which additional imaging was recommended.  This took place on May 24, 2019 and confirmed nodularity for which a stereotactic biopsy was recommended.  The biopsy took place on May 31, 2018 and showed benign fibrocystic changes associated with fibroadenomatosis with no signs of atypia.  Since her last visit she reports that she lost her 37-year-old daughter and her metastatic breast cancer.  There has been no known genetic testing in the family.  She has been having some pain in her left breast and therefore was referred for a bilateral diagnostic evaluation on 2024 that confirmed a stable parenchymal pattern with no new suspicious findings and no explanation for her left breast pain.  She did not have an ultrasound performed as part of her diagnostic evaluation and she was noted to have scattered densities at the time of her visit.  She is here today for evaluation and recommendations for further therapy.        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 Dr. Stanley at [facility]. S/P Moh's.        Past Surgical History:   Procedure Laterality Date          X 2.     Colposcopy, cervix w/upper adjacent vagina; w/biopsy(s), cervix      See Dr. Bhandari notes    Bobby biopsy stereo nodule 1 site  left (cpt=19081)         Gynecological History:  Pt is a   Pt was 23 years old at time of first pregnancy.    She has cumulative breastfeeding history of 16 months, last unknown.  She achieved menarche at age 10  Age of Menopause: 48  Type: natural  She denies any history of hormone replacement therapy.  She has history of oral contraceptive use for unknown years, last .  She denies infertility treatment to achieve pregnancy.    Medications:     atorvastatin 20 MG Oral Tab Take 1 tablet (20 mg total) by mouth every evening. 90 tablet 3    Omeprazole 40 MG Oral Capsule Delayed Release Take 1 capsule (40 mg total) by mouth daily. 30 capsule 3    Triamterene-HCTZ 37.5-25 MG Oral Tab TAKE 1/2 TABLET BY MOUTH EVERY MORNING 90 tablet 3    triamcinolone 0.1 % External Cream Apply topically 2 (two) times daily. 28.4 g 1    clobetasol 0.05 % External Ointment Apply 1 Application topically 2 (two) times daily. 30 g 3    atenolol 25 MG Oral Tab Take 1 tablet (25 mg total) by mouth every 12 (twelve) hours. 180 tablet 3    valACYclovir (VALTREX) 500 MG Oral Tab Take 1 tablet (500 mg total) by mouth 2 (two) times daily. For 3-5 days as needed 30 tablet 2       Allergies:    Allergies   Allergen Reactions    Kiwi Extract     Latex ITCHING and RASH    Watermelon        Family History:   Family History   Adopted: Yes   Problem Relation Age of Onset    Heart Disorder Father     Breast Cancer Daughter 36        passed at 37    Lipids Paternal Aunt     Heart Disorder Paternal Aunt     Lipids Paternal Aunt     Heart Disorder Paternal Aunt        She is not of Ashkenazi Gnosticist ancestry.    Social History:  History   Alcohol Use No       History   Smoking Status    Never   Smokeless Tobacco    Never   The patient is .  She has 2 children.  She is employed full-time.    Review of Systems:  General:   The patient denies, fever, chills, night sweats, fatigue, generalized weakness, change in appetite or weight loss.    HEENT:      The patient denies eye irritation, cataracts, redness, glaucoma, yellowing of the eyes, change in vision or color blindness. The patient denies hearing loss, ringing in the ears, ear drainage, earaches, nasal congestion, nose bleeds, snoring, pain in mouth/throat, hoarseness, change in voice, facial trauma.    Respiratory:  The patient denies chronic cough, phlegm, hemoptysis, pleurisy/chest pain, pneumonia, asthma, wheezing, difficulty in breathing with exertion, emphysema, chronic bronchitis, shortness of breath or abnormal sound when breathing.     Cardiovascular:  There is no history of chest pain, chest pressure/discomfort, palpitations, irregular heartbeat, fainting or near-fainting, difficulty breathing when lying flat, SOB/Coughing at night, swelling of the legs or chest pain while walking.    Breasts:  See history of present illness    Gastrointestinal:     There is no history of difficulty or pain with swallowing, +reflux symptoms, vomiting, dark or bloody stools, constipation, yellowing of the skin, indigestion, nausea, change in bowel habits, diarrhea, abdominal pain or vomiting blood.     Genitourinary:  The patient denies frequent urination, needing to get up at night to urinate, urinary hesitancy or retaining urine, painful urination, urinary incontinence, decreased urine stream, blood in the urine or vaginal/penile discharge.    Skin:    The patient denies rash, itching, skin lesions, +dry skin, change in skin color or change in moles.     Hematologic/Lymphatic:  The patient denies easily bruising or bleeding or persistent swollen glands or lymph nodes.     Musculoskeletal:  The patient denies muscle aches/pain, joint pain, stiff joints, neck pain, +back pain or bone pain.    Neuropsychiatric:  There is no history of migraines or severe headaches, seizure/epilepsy, speech problems, coordination problems, trembling/tremors, fainting/black outs, dizziness, memory problems, loss of  sensation/numbness, problems walking, weakness, tingling or burning in hands/feet. There is no history of abusive relationship, bipolar disorder, sleep disturbance, anxiety, depression or feeling of despair.    Endocrine:    There is no history of poor/slow wound healing, weight loss/gain, fertility or hormone problems, cold intolerance, thyroid disease.     Allergic/Immunologic:  There is no history of hives, hay fever, angioedema or anaphylaxis.    /76 (BP Location: Right arm, Patient Position: Sitting, Cuff Size: adult)   Pulse 56   Resp 16   Wt 67.7 kg (149 lb 3.2 oz)   SpO2 96%   BMI 25.61 kg/m²     Physical Exam:  The patient is an alert, oriented, well-nourished and  well-developed woman who appears her stated age. Her speech patterns and movements are normal. Her affect is appropriate.    HEENT: The head is normocephalic. The neck is supple. The thyroid is not enlarged and is without palpable masses/nodules. There are no palpable masses. The trachea is in the midline. Conjunctiva are clear, non-icteric.    Chest: The chest expands symmetrically. The lungs are clear to auscultation.    Heart: The rhythm is regular.  There are no murmurs, rubs, gallops or thrills.    Breasts:  Her breasts are symmetrical with a cup size B/C.  Right breast: The skin, nipple ,and areola appear normal. There is no skin dimpling with movement of the pectoralis. There is no nipple retraction. No nipple discharge can be elicited. The parenchyma is mildly nodular. There are no dominant masses in the breast. The axillary tail is normal.  Left breast:   The skin, nipple, and areola appear normal. There is no skin dimpling with movement of the pectoralis. There is no nipple retraction. No nipple discharge can be elicited. The parenchyma is mildly nodular. There are no dominant masses in the breast. The axillary tail is normal.  She does have reproducible tenderness to palpation along the lateral breast towards the  axilla.    Abdomen:  The abdomen is soft, flat and non tender. The liver is not enlarged. There are no palpable masses.    Lymph Nodes:  The supraclavicular, axillary and cervical regions are free of significant lymphadenopathy.    Back: There is no vertebral column tenderness.    Skin: The skin appears normal. There are no suspicious appearing rashes or lesions.    Extremities: The extremities are without deformity, cyanosis or edema.    Impression:   Ms. Elena Rea is a 64 year old woman presents with history of benign left breast biopsy with family history of breast cancer and new onset left breast pain.    Discussion and Plan:  I had a discussion with the Patient regarding her breast exam. On exam today I found no clinical evidence of malignancy bilaterally though she is reproducibly tender to palpation in the lateral left breast.  I reviewed the recent imaging which demonstrated no concerns.  She did not have any ultrasound evaluation of the area of concern including evaluation of the axilla and therefore I am recommending an ultrasound more for a more targeted look at the area of concern.  We will plan on a bilateral complete ultrasound given her high risk in the context of her family history.  I am also referring her to our genetic counselor for consideration of testing in light of her daughters young age of diagnosis and unknown genetic testing results.  Pending the results of the bilateral complete ultrasound we will determine if any intervention for her left breast pain is needed.  The patient is found to have a genetic mutation she may benefit from ongoing annual MRI surveillance in addition to her annual bilateral mammogram.  If no additional findings are found on the further diagnostic workup I anticipate her next mammogram will be due in August 2025. She was given ample opportunity for questions and those questions were answered to her satisfaction. She has been  encouraged to contact the office  with any questions or concerns prior to her next appointment.

## 2024-11-27 ENCOUNTER — APPOINTMENT (OUTPATIENT)
Dept: HEMATOLOGY/ONCOLOGY | Facility: HOSPITAL | Age: 64
End: 2024-11-27
Attending: INTERNAL MEDICINE
Payer: COMMERCIAL

## 2024-11-27 ENCOUNTER — APPOINTMENT (OUTPATIENT)
Dept: GENETICS | Facility: HOSPITAL | Age: 64
End: 2024-11-27
Attending: SURGERY
Payer: COMMERCIAL

## 2024-12-02 ENCOUNTER — PATIENT MESSAGE (OUTPATIENT)
Dept: INTERNAL MEDICINE CLINIC | Facility: CLINIC | Age: 64
End: 2024-12-02

## 2024-12-02 DIAGNOSIS — R00.2 PALPITATION: Primary | ICD-10-CM

## 2024-12-03 NOTE — TELEPHONE ENCOUNTER
We talked about several things but I do remember talking about palpitations.  We talked about the anxiety and the stress.  I can put an order in for Holter monitor.  I am not sure about any female cardiologist.  Can recommend Dr. Michael Chaves or Dr. Behzad Poole or Dr. Reed or Dr. Tejeda.  Holter monitor is only 48 hours.  If the symptoms are not having that often we may not get to it when having symptoms.  I cannot order an event monitor which is a 30-day only cardiology can order that.

## 2024-12-19 ENCOUNTER — TELEPHONE (OUTPATIENT)
Dept: SURGERY | Facility: CLINIC | Age: 64
End: 2024-12-19

## 2024-12-19 NOTE — TELEPHONE ENCOUNTER
Called patient and informed her that I called [facility], and talk to Marisol regarding her reports. I was informed by Marisol that the report is not yet ready.I have requested [facility] to Fax the report to our office once it is ready and the fax number is provided to Marisol. Informed patient that once we have the report Dr. Ordaz/Claudia(NP) will review the report and some one from our office will get in touch with the patient once it is reviewed. Patient stated that she is very stressed and will appreciate if some one can call her or send her a massage once the report is received and reviewed. All other questions patient had were answered to the best of my ability. Patient verbalized understanding and was very appreciative of the call.

## 2024-12-19 NOTE — TELEPHONE ENCOUNTER
Patient called stating that she had dropped of the disc of her breast ultrasound that was don on 12/17 at [facility]. Patient stated that she was not able to drop off the report as it was not ready at that time. Patient stated that she has been trying to call [facility] to get the report but is not getting anywhere. Patient requested if I could reach out to the imaging center and see we can get the report for Dr. Ordaz to review. Informed patient that I will try calling [facility] to see if I can get the reports of her ultrasound fax over to our office and will call her back .Patient was very appreciative of the help. All other questions patient had were answered to the best of my ability. Patient verbalized understanding.

## 2024-12-20 ENCOUNTER — TELEPHONE (OUTPATIENT)
Dept: SURGERY | Facility: CLINIC | Age: 64
End: 2024-12-20

## 2024-12-20 NOTE — TELEPHONE ENCOUNTER
Patient called stating that she has received the results of her ultrasound and if either /Claudia could review the report and get back to her with the recommendation. Informed patient that we have not received any fax from the place where she had her imaging done. Patient wanted us to call the facility to see if we can get the reports for /Claudia to review.Patient was informed that I will call [facility] and have them fax the report to us. All questions patient had were answered to the best of my ability. Patient verbalized understanding.The imaging center was called and requested the report to be faxed over to our office. The fax number was provided to the [facility] to fax the report.

## 2025-01-06 ENCOUNTER — PATIENT MESSAGE (OUTPATIENT)
Dept: INTERNAL MEDICINE CLINIC | Facility: CLINIC | Age: 65
End: 2025-01-06

## 2025-01-06 RX ORDER — VALACYCLOVIR HYDROCHLORIDE 500 MG/1
500 TABLET, FILM COATED ORAL 2 TIMES DAILY
Qty: 30 TABLET | Refills: 0 | Status: SHIPPED | OUTPATIENT
Start: 2025-01-06

## 2025-01-06 NOTE — TELEPHONE ENCOUNTER
Last annual - 8/29/2024 with Dr. Nava.    Last refill - Valtrex 500mg #30 with 2 refills on 12/12/2023.  Directions are to take 1 tablet teice a day for 3-5 days as needed.

## 2025-01-06 NOTE — TELEPHONE ENCOUNTER
Elena states she has oral outbreak on her lip that started yesterday, 1/5/25.   Dr. Nava out of office. Last filled by [facility].   To [facility] for refill.

## 2025-01-08 RX ORDER — TRIAMTERENE AND HYDROCHLOROTHIAZIDE 37.5; 25 MG/1; MG/1
TABLET ORAL
Qty: 90 TABLET | Refills: 3 | Status: SHIPPED | OUTPATIENT
Start: 2025-01-08

## 2025-01-08 NOTE — TELEPHONE ENCOUNTER
Refill passed per [facility] protocol. However per chart review, dosage was increased to 1/2 tablet on 09/17/24. Refill request from pharmacy is for 1/4 tablet. Please advise on correct dosage patient should be on?    Requested Prescriptions   Pending Prescriptions Disp Refills    Triamterene-HCTZ 37.5-25 MG Oral Tab [Pharmacy Med Name: TRIAMTERENE 37.5MG/ [id number] TABS] 90 tablet 3     Sig: TAKE 1/4 TABLET BY MOUTH EVERY MORNING       Hypertension Medications Protocol Passed - 1/8/2025 12:17 PM        Passed - CMP or BMP in past 12 months        Passed - Last BP reading less than 140/90     BP Readings from Last 1 Encounters:   11/13/24 137/76               Passed - In person appointment or virtual visit in the past 12 mos or appointment in next 3 mos     Recent Outpatient Visits              1 month ago Family history of breast cancer    [facility] Ordaz MD    Office Visit    3 months ago Abnormal CT of the chest    [facility] Destini Ramos MD    Office Visit    4 months ago Abnormal CT of the chest    [facility] Darío Lira MD    Office Visit    4 months ago Encounter for gynecological examination without abnormal finding    [facility] - OB/GYN Marychuy Nava MD    Office Visit    7 months ago Kidney stone on right side    [facility] Fabrice Angel MD    Office Visit          Future Appointments         Provider Department Appt Notes    In 6 days Destini Ramos MD [facility] ok per dr morgan    In 7 months Marychuy Nava MD [facility] - OB/GYN ANNUAL                    Passed - EGFRCR or GFRNAA > 50     GFR Evaluation  EGFRCR: 87 , resulted on 10/12/2024          Passed - Medication is  active on med list

## 2025-01-09 RX ORDER — TRIAMTERENE AND HYDROCHLOROTHIAZIDE 37.5; 25 MG/1; MG/1
TABLET ORAL
Qty: 90 TABLET | Refills: 3 | OUTPATIENT
Start: 2025-01-09

## 2025-01-14 ENCOUNTER — OFFICE VISIT (OUTPATIENT)
Dept: INTERNAL MEDICINE CLINIC | Facility: CLINIC | Age: 65
End: 2025-01-14

## 2025-01-14 VITALS
BODY MASS INDEX: 25.03 KG/M2 | DIASTOLIC BLOOD PRESSURE: 82 MMHG | SYSTOLIC BLOOD PRESSURE: 132 MMHG | OXYGEN SATURATION: 100 % | HEIGHT: 64 IN | TEMPERATURE: 98 F | WEIGHT: 146.63 LBS | HEART RATE: 62 BPM

## 2025-01-14 DIAGNOSIS — E78.00 HYPERCHOLESTEROLEMIA: ICD-10-CM

## 2025-01-14 DIAGNOSIS — R00.2 PALPITATION: ICD-10-CM

## 2025-01-14 DIAGNOSIS — E87.6 HYPOKALEMIA: ICD-10-CM

## 2025-01-14 DIAGNOSIS — R73.9 HYPERGLYCEMIA: ICD-10-CM

## 2025-01-14 DIAGNOSIS — I10 HYPERTENSION, BENIGN: Primary | ICD-10-CM

## 2025-01-14 DIAGNOSIS — Z71.85 VACCINE COUNSELING: ICD-10-CM

## 2025-01-14 DIAGNOSIS — M85.89 OSTEOPENIA OF MULTIPLE SITES: ICD-10-CM

## 2025-01-14 DIAGNOSIS — E53.8 VITAMIN B 12 DEFICIENCY: ICD-10-CM

## 2025-01-14 PROCEDURE — 93000 ELECTROCARDIOGRAM COMPLETE: CPT | Performed by: INTERNAL MEDICINE

## 2025-01-14 PROCEDURE — 3079F DIAST BP 80-89 MM HG: CPT | Performed by: INTERNAL MEDICINE

## 2025-01-14 PROCEDURE — 3008F BODY MASS INDEX DOCD: CPT | Performed by: INTERNAL MEDICINE

## 2025-01-14 PROCEDURE — 99215 OFFICE O/P EST HI 40 MIN: CPT | Performed by: INTERNAL MEDICINE

## 2025-01-14 PROCEDURE — 3075F SYST BP GE 130 - 139MM HG: CPT | Performed by: INTERNAL MEDICINE

## 2025-01-14 RX ORDER — VALACYCLOVIR HYDROCHLORIDE 500 MG/1
500 TABLET, FILM COATED ORAL 2 TIMES DAILY
Qty: 30 TABLET | Refills: 1 | Status: SHIPPED | OUTPATIENT
Start: 2025-01-14

## 2025-01-14 RX ORDER — TRIAMTERENE AND HYDROCHLOROTHIAZIDE 37.5; 25 MG/1; MG/1
TABLET ORAL
Qty: 90 TABLET | Refills: 3 | Status: SHIPPED | OUTPATIENT
Start: 2025-01-14

## 2025-01-14 RX ORDER — ATORVASTATIN CALCIUM 20 MG/1
20 TABLET, FILM COATED ORAL EVERY EVENING
Qty: 90 TABLET | Refills: 3 | Status: SHIPPED | OUTPATIENT
Start: 2025-01-14

## 2025-01-14 RX ORDER — ATENOLOL 25 MG/1
25 TABLET ORAL EVERY 12 HOURS
Qty: 180 TABLET | Refills: 3 | Status: SHIPPED | OUTPATIENT
Start: 2025-01-14

## 2025-01-14 RX ORDER — HYDROXYZINE HYDROCHLORIDE 25 MG/1
25 TABLET, FILM COATED ORAL NIGHTLY PRN
Qty: 30 TABLET | Refills: 0 | Status: SHIPPED | OUTPATIENT
Start: 2025-01-14

## 2025-01-14 NOTE — PATIENT INSTRUCTIONS
ASSESSMENT/PLAN:     Encounter Diagnoses   Name Primary?    Hypertension, benign Stable. White coat. Careful with diet and excercise at least 30 minutes 3-4 times a week. Check blood pressures at different times on different days. Can purchase own blood pressure monitor. If not, check at local pharmacy. Bake foods more and grill occasionally. Avoid fried foods. No salt. Use other seasonings.     Yes    Hyperglycemia Lower carbs.        Hypercholesterolemia Stable.        Hypokalemia Resolved.        Vaccine counseling Holding all vaccines including COVID booster and influenza vaccine and pneumonia 20 vaccine. Recommend shingles vaccine.  There is 2 doses of the vaccine  by 2 months 6 months apart.  Check on insurance coverage on shingles vaccine.  May be covered better at the pharmacy.  Separate shingles vaccine from all of the vaccines by at least 1 to 2 weeks.  Discussed about side effects of vaccine.        Vitamin B 12 deficiency Stable.        Osteopenia of multiple sites Take calcium 600 every 12 hrs. With vitamin D 400 IU every  12 hrs. Excerise at least 30 minutes 3-4 times a week. May use calcium citrate as opposed to calcium carbonate which may be better absorbed in the setting of PPI use.  The preferred calcium supplement for people at risk of stone formation is calcium citrate because it helps to increase urinary citrate excretion. We recommend a dose of 200-400 mg if dietary calcium cannot be increased.       Insomnia. Stick to a sleep schedule. Keep your bedtime and wake time consistent from day to day, including on weekends.   Stay active. Regular activity helps promote a good night's sleep. Schedule exercise at least a few hours before bedtime and avoid stimulating activities before bedtime.   Check your medications. If you take medications regularly, check with your doctor to see if they may be contributing to your insomnia. Also check the labels of OTC products to see if they contain  caffeine or other stimulants, such as pseudoephedrine.   Avoid or limit naps. Naps can make it harder to fall asleep at night. If you can't get by without one, try to limit a nap to no more than 30 minutes and don't nap after 3 p.m.   Avoid or limit caffeine and alcohol and don't use nicotine. All of these can make it harder to sleep, and effects can last for several hours.   Avoid large meals and beverages before bed. A light snack is fine and may help avoid heartburn. Drink less liquid before bedtime so that you won't have to urinate as often.  At bedtime:  Try melatonin 10 mg 30 minutes before bed.  Make your bedroom comfortable for sleep. Only use your bedroom for sex or sleep. Keep it dark and quiet, at a comfortable temperature. Hide all clocks in your bedroom, including your wristwatch and cellphone, so you don't worry about what time it is.   Find ways to relax. Try to put your worries and planning aside when you get into bed. A warm bath or a massage before bedtime can help prepare you for sleep. Create a relaxing bedtime ritual, such as taking a hot bath, reading, soft music, breathing exercises, yoga or prayer.   Avoid trying too hard to sleep. The harder you try, the more awake you'll become. Read in another room until you become very drowsy, then go to bed to sleep. Don't go to bed too early, before you're sleepy.   Get out of bed when you're not sleeping. Sleep as much as you need to feel rested, and then get out of bed. Don't stay in bed if you're not sleeping.  Dw. Pt of options.   Atarax 25 mg at night as needed. Discussed with patient of side effects and use of these medications.    Palp. Check ECG, FU cards.   No orders of the defined types were placed in this encounter.      Meds This Visit:  Requested Prescriptions     Signed Prescriptions Disp Refills    atorvastatin 20 MG Oral Tab 90 tablet 3     Sig: Take 1 tablet (20 mg total) by mouth every evening.    valACYclovir (VALTREX) 500 MG Oral Tab  30 tablet 1     Sig: Take 1 tablet (500 mg total) by mouth 2 (two) times daily. For 3-5 days as needed    Triamterene-HCTZ 37.5-25 MG Oral Tab 90 tablet 3     Sig: TAKE 1/4 TABLET BY MOUTH EVERY MORNING    atenolol 25 MG Oral Tab 180 tablet 3     Sig: Take 1 tablet (25 mg total) by mouth every 12 (twelve) hours.    hydrOXYzine 25 MG Oral Tab 30 tablet 0     Sig: Take 1 tablet (25 mg total) by mouth nightly as needed for Itching.       Imaging & Referrals:  ELECTROCARDIOGRAM, COMPLETE      RTC 9-17-25 for physical.

## 2025-01-14 NOTE — PROGRESS NOTES
HPI:    Patient ID: Elena Rea is a 64 year old female.    Hypertension  Patient is here for follow up of hypertension. BP at home: not taken.   Knows machine is accurate. White coat. Has been compliant with medications.  Exercise level: somewhat active (bike X 20 minutes and treadmill) and has been following low salt diet.  Weight has been stable. Cooks. No added salt. Dinner meat with veggie and salad. Usu. Bake or pan thomas. No added salt.   Wt Readings from Last 3 Encounters:   01/14/25 146 lb 9.6 oz (66.5 kg)   11/13/24 149 lb 3.2 oz (67.7 kg)   09/17/24 148 lb 3.2 oz (67.2 kg)     BP Readings from Last 3 Encounters:   01/14/25 132/82   11/13/24 137/76   09/17/24 136/69     Labs:   Lab Results   Component Value Date/Time    GLU 91 10/12/2024 07:07 AM     10/12/2024 07:07 AM    K 4.0 10/12/2024 07:07 AM     10/12/2024 07:07 AM    CO2 30 10/12/2024 07:07 AM    CREATSERUM 0.76 10/12/2024 07:07 AM    CA 9.6 10/12/2024 07:07 AM    AST 22 10/12/2024 07:07 AM    ALT 21 10/12/2024 07:07 AM    TSH 1.460 03/24/2022 09:26 AM        Lab Results   Component Value Date/Time    CHOLEST 167 10/12/2024 07:07 AM    HDL 62 10/12/2024 07:07 AM    TRIG 90 10/12/2024 07:07 AM    LDL 87 10/12/2024 07:07 AM    NONHDLC 105 10/12/2024 07:07 AM            Wt Readings from Last 3 Encounters:   01/14/25 146 lb 9.6 oz (66.5 kg)   11/13/24 149 lb 3.2 oz (67.7 kg)   09/17/24 148 lb 3.2 oz (67.2 kg)     BP Readings from Last 3 Encounters:   01/14/25 132/82   11/13/24 137/76   09/17/24 136/69     Labs:   Lab Results   Component Value Date/Time    GLU 91 10/12/2024 07:07 AM     10/12/2024 07:07 AM    K 4.0 10/12/2024 07:07 AM     10/12/2024 07:07 AM    CO2 30 10/12/2024 07:07 AM    CREATSERUM 0.76 10/12/2024 07:07 AM    CA 9.6 10/12/2024 07:07 AM    AST 22 10/12/2024 07:07 AM    ALT 21 10/12/2024 07:07 AM    TSH 1.460 03/24/2022 09:26 AM        Lab Results   Component Value Date/Time    CHOLEST 167 10/12/2024 07:07 AM     HDL 62 10/12/2024 07:07 AM    TRIG 90 10/12/2024 07:07 AM    LDL 87 10/12/2024 07:07 AM    NONHDLC 105 10/12/2024 07:07 AM          Cataract Sx. for WE. Blurriness in R eye gone.     Goes to at 1030 PM. No caffeine. Drinks a lot of wate rB 4 bed. Stays up til 4 AM. Gets up. Worrier. Tries to shut down mind. Tries ot do breathing. Get up at 5-6 AM normally.     Tried to get a Holter but was told cannot be done at first had to go to [place] and she would have to return this within 3 days back to [place].  That was very inconvenient so she did not go further.    Palpitations   This is a chronic problem. The current episode started more than 1 month ago. On average, each episode lasts 3 minutes. Nothing aggravates the symptoms. Pertinent negatives include no anxiety, chest fullness, chest pain, coughing, diaphoresis, dizziness, fever, irregular heartbeat, malaise/fatigue, nausea, near-syncope, numbness, shortness of breath, syncope, vomiting or weakness. She has tried nothing for the symptoms. There is no history of anemia, drug use or hyperthyroidism.         Review of Systems   Constitutional:  Positive for fatigue. Negative for activity change, appetite change, chills, diaphoresis, fever, malaise/fatigue and unexpected weight change.   Respiratory:  Negative for apnea, cough, choking, chest tightness, shortness of breath, wheezing and stridor.    Cardiovascular:  Positive for palpitations. Negative for chest pain, leg swelling, syncope and near-syncope.   Gastrointestinal:  Negative for abdominal distention, abdominal pain, nausea and vomiting.   Endocrine: Negative for cold intolerance, heat intolerance, polydipsia, polyphagia and polyuria.   Neurological:  Negative for dizziness, tremors, seizures, syncope, facial asymmetry, speech difficulty, weakness, light-headedness, numbness and headaches.   Psychiatric/Behavioral:  Positive for sleep disturbance. Negative for agitation, behavioral problems, confusion,  decreased concentration, dysphoric mood, hallucinations, self-injury and suicidal ideas. The patient is not nervous/anxious and is not hyperactive.    All other systems reviewed and are negative.        Current Outpatient Medications   Medication Sig Dispense Refill    atorvastatin 20 MG Oral Tab Take 1 tablet (20 mg total) by mouth every evening. 90 tablet 3    valACYclovir (VALTREX) 500 MG Oral Tab Take 1 tablet (500 mg total) by mouth 2 (two) times daily. For 3-5 days as needed 30 tablet 1    Triamterene-HCTZ 37.5-25 MG Oral Tab TAKE 1/4 TABLET BY MOUTH EVERY MORNING 90 tablet 3    atenolol 25 MG Oral Tab Take 1 tablet (25 mg total) by mouth every 12 (twelve) hours. 180 tablet 3    hydrOXYzine 25 MG Oral Tab Take 1 tablet (25 mg total) by mouth nightly as needed for Itching. 30 tablet 0    Omeprazole 40 MG Oral Capsule Delayed Release Take 1 capsule (40 mg total) by mouth daily. 30 capsule 3    triamcinolone 0.1 % External Cream Apply topically 2 (two) times daily. 28.4 g 1    clobetasol 0.05 % External Ointment Apply 1 Application topically 2 (two) times daily. 30 g 3     Allergies:Allergies[1]    HISTORY:  Past Medical History:    Bowen's disease    Cancer (HCC)    Squamous cell/ basal cell    Genital herpes simplex    Hyperlipidemia    Hypertension    Hyperthyroidism    Screen for colon cancer    repeat CLN in 10  years    Squamous cell cancer of scalp and skin of neck    Chest. Dr. Stanley at [facility]. S/P Moh's.       Past Surgical History:   Procedure Laterality Date          X 2.     Colposcopy, cervix w/upper adjacent vagina; w/biopsy(s), cervix      See Dr. Bhandari notes    Bobby biopsy stereo nodule 1 site left (cpt=19081)        Family History   Adopted: Yes   Problem Relation Age of Onset    Heart Disorder Father     Breast Cancer Daughter 36        passed at 37    Lipids Paternal Aunt     Heart Disorder Paternal Aunt     Lipids Paternal Aunt     Heart Disorder Paternal Aunt       Social  History:   Social History     Socioeconomic History    Marital status:    Occupational History    Occupation: Hosp. admin. retired.     Occupation: Runs .     Comment: Self-employed.   Tobacco Use    Smoking status: Never     Passive exposure: Past    Smokeless tobacco: Never   Vaping Use    Vaping status: Never Used   Substance and Sexual Activity    Alcohol use: No    Drug use: Never    Sexual activity: Not Currently   Other Topics Concern    Caffeine Concern No        PHYSICAL EXAM:   /82 (BP Location: Left arm, Patient Position: Sitting, Cuff Size: adult)   Pulse 62   Temp 97.5 °F (36.4 °C) (Temporal)   Ht 5' 4\" (1.626 m)   Wt 146 lb 9.6 oz (66.5 kg)   SpO2 100%   BMI 25.16 kg/m²   BP Readings from Last 3 Encounters:   01/14/25 132/82   11/13/24 137/76   09/17/24 136/69     Wt Readings from Last 3 Encounters:   01/14/25 146 lb 9.6 oz (66.5 kg)   11/13/24 149 lb 3.2 oz (67.7 kg)   09/17/24 148 lb 3.2 oz (67.2 kg)       Physical Exam  Vitals and nursing note reviewed.   Constitutional:       General: She is not in acute distress.     Appearance: Normal appearance. She is well-developed. She is not ill-appearing, toxic-appearing or diaphoretic.      Interventions: She is not intubated.  Neck:      Thyroid: No thyroid mass or thyromegaly.      Trachea: Trachea and phonation normal.   Cardiovascular:      Rate and Rhythm: Normal rate and regular rhythm.      Pulses: Normal pulses. No decreased pulses.           Carotid pulses are 2+ on the right side and 2+ on the left side.       Radial pulses are 2+ on the right side and 2+ on the left side.        Dorsalis pedis pulses are 2+ on the right side and 2+ on the left side.        Posterior tibial pulses are 2+ on the right side and 2+ on the left side.      Heart sounds: Normal heart sounds, S1 normal and S2 normal.   Pulmonary:      Effort: Pulmonary effort is normal. No tachypnea, bradypnea, accessory muscle usage, prolonged expiration,  respiratory distress or retractions. She is not intubated.      Breath sounds: Normal breath sounds and air entry. No stridor, decreased air movement or transmitted upper airway sounds. No decreased breath sounds, wheezing, rhonchi or rales.   Chest:      Chest wall: No tenderness.   Abdominal:      General: There is no distension.      Palpations: Abdomen is soft.      Tenderness: There is no abdominal tenderness.   Musculoskeletal:      Right lower leg: No edema.      Left lower leg: No edema.   Skin:     General: Skin is warm and dry.   Neurological:      Mental Status: She is alert and oriented to person, place, and time.   Psychiatric:         Mood and Affect: Mood is not anxious, depressed or elated. Affect is not labile, blunt, flat, angry, tearful or inappropriate.         Speech: She is communicative. Speech is not rapid and pressured, delayed, slurred or tangential.         Behavior: Behavior normal. Behavior is cooperative.         Thought Content: Thought content normal.       Webster sleep scale: 5.        ASSESSMENT/PLAN:     Encounter Diagnoses   Name Primary?    Hypertension, benign Stable. White coat. Careful with diet and excercise at least 30 minutes 3-4 times a week. Check blood pressures at different times on different days. Can purchase own blood pressure monitor. If not, check at local pharmacy. Bake foods more and grill occasionally. Avoid fried foods. No salt. Use other seasonings.     Yes    Hyperglycemia Lower carbs.        Hypercholesterolemia Stable.        Hypokalemia Resolved.        Vaccine counseling Holding all vaccines including COVID booster and influenza vaccine and pneumonia 20 vaccine. Recommend shingles vaccine.  There is 2 doses of the vaccine  by 2 months 6 months apart.  Check on insurance coverage on shingles vaccine.  May be covered better at the pharmacy.  Separate shingles vaccine from all of the vaccines by at least 1 to 2 weeks.  Discussed about side effects  of vaccine.        Vitamin B 12 deficiency Stable.        Osteopenia of multiple sites Take calcium 600 every 12 hrs. With vitamin D 400 IU every  12 hrs. Excerise at least 30 minutes 3-4 times a week. May use calcium citrate as opposed to calcium carbonate which may be better absorbed in the setting of PPI use.  The preferred calcium supplement for people at risk of stone formation is calcium citrate because it helps to increase urinary citrate excretion. We recommend a dose of 200-400 mg if dietary calcium cannot be increased.       Insomnia. Stick to a sleep schedule. Keep your bedtime and wake time consistent from day to day, including on weekends.   Stay active. Regular activity helps promote a good night's sleep. Schedule exercise at least a few hours before bedtime and avoid stimulating activities before bedtime.   Check your medications. If you take medications regularly, check with your doctor to see if they may be contributing to your insomnia. Also check the labels of OTC products to see if they contain caffeine or other stimulants, such as pseudoephedrine.   Avoid or limit naps. Naps can make it harder to fall asleep at night. If you can't get by without one, try to limit a nap to no more than 30 minutes and don't nap after 3 p.m.   Avoid or limit caffeine and alcohol and don't use nicotine. All of these can make it harder to sleep, and effects can last for several hours.   Avoid large meals and beverages before bed. A light snack is fine and may help avoid heartburn. Drink less liquid before bedtime so that you won't have to urinate as often.  At bedtime:  Try melatonin 10 mg 30 minutes before bed.  Make your bedroom comfortable for sleep. Only use your bedroom for sex or sleep. Keep it dark and quiet, at a comfortable temperature. Hide all clocks in your bedroom, including your wristwatch and cellphone, so you don't worry about what time it is.   Find ways to relax. Try to put your worries and planning  aside when you get into bed. A warm bath or a massage before bedtime can help prepare you for sleep. Create a relaxing bedtime ritual, such as taking a hot bath, reading, soft music, breathing exercises, yoga or prayer.   Avoid trying too hard to sleep. The harder you try, the more awake you'll become. Read in another room until you become very drowsy, then go to bed to sleep. Don't go to bed too early, before you're sleepy.   Get out of bed when you're not sleeping. Sleep as much as you need to feel rested, and then get out of bed. Don't stay in bed if you're not sleeping.  Dw. Pt of options.   Atarax 25 mg at night as needed. Discussed with patient of side effects and use of these medications.    Palp. Check ECG, FU cards.   No orders of the defined types were placed in this encounter.      Meds This Visit:  Requested Prescriptions     Signed Prescriptions Disp Refills    atorvastatin 20 MG Oral Tab 90 tablet 3     Sig: Take 1 tablet (20 mg total) by mouth every evening.    valACYclovir (VALTREX) 500 MG Oral Tab 30 tablet 1     Sig: Take 1 tablet (500 mg total) by mouth 2 (two) times daily. For 3-5 days as needed    Triamterene-HCTZ 37.5-25 MG Oral Tab 90 tablet 3     Sig: TAKE 1/4 TABLET BY MOUTH EVERY MORNING    atenolol 25 MG Oral Tab 180 tablet 3     Sig: Take 1 tablet (25 mg total) by mouth every 12 (twelve) hours.    hydrOXYzine 25 MG Oral Tab 30 tablet 0     Sig: Take 1 tablet (25 mg total) by mouth nightly as needed for Itching.       Imaging & Referrals:  ELECTROCARDIOGRAM, COMPLETE      RTC 9-17-25 for physical.          [1]   Allergies  Allergen Reactions    Kiwi Extract     Latex ITCHING and RASH    [place]

## 2025-01-15 ENCOUNTER — TELEPHONE (OUTPATIENT)
Dept: INTERNAL MEDICINE CLINIC | Facility: CLINIC | Age: 65
End: 2025-01-15

## 2025-01-15 LAB
ATRIAL RATE: 54 BPM
P AXIS: 58 DEGREES
P-R INTERVAL: 196 MS
Q-T INTERVAL: 478 MS
QRS DURATION: 96 MS
QTC CALCULATION (BEZET): 453 MS
R AXIS: -17 DEGREES
T AXIS: 61 DEGREES
VENTRICULAR RATE: 54 BPM

## 2025-01-15 NOTE — TELEPHONE ENCOUNTER
RN was on hold with central scheduling to try to find out the holter monitor process. Was on hold for 15 minutes. Will follow up tomorrow.

## 2025-01-15 NOTE — TELEPHONE ENCOUNTER
She was told that she has to go to [facility] for the Holter monitor.  And she has to drop it back there.  Not sure why she has to do that?

## 2025-01-16 NOTE — TELEPHONE ENCOUNTER
She has an appointment with cardiologist.  Maybe it is better to just have her do everything through cardiology.

## 2025-01-16 NOTE — TELEPHONE ENCOUNTER
Hi Doctor Richard,    I called central scheduling to find out why the patient was told to go to [place]. She said that although [facility] is an option they are not currently doing the 48 hour Holter monitoring. They are only doing the Zio monitoring at the moment. So I the patient needs the 48 hour monitoring she would have to travel to [place].

## 2025-01-17 ENCOUNTER — MED REC SCAN ONLY (OUTPATIENT)
Dept: INTERNAL MEDICINE CLINIC | Facility: CLINIC | Age: 65
End: 2025-01-17

## 2025-03-12 RX ORDER — TRIAMTERENE AND HYDROCHLOROTHIAZIDE 37.5; 25 MG/1; MG/1
TABLET ORAL
Qty: 90 TABLET | Refills: 3 | OUTPATIENT
Start: 2025-03-12

## 2025-03-12 NOTE — TELEPHONE ENCOUNTER
Triamterene-hydrochlorothiazide 37.5-25m year supply sent to [facility] in [place] on 2025

## 2025-03-14 ENCOUNTER — PATIENT MESSAGE (OUTPATIENT)
Dept: INTERNAL MEDICINE CLINIC | Facility: CLINIC | Age: 65
End: 2025-03-14

## 2025-03-14 RX ORDER — TRIAMTERENE AND HYDROCHLOROTHIAZIDE 37.5; 25 MG/1; MG/1
TABLET ORAL
Qty: 90 TABLET | Refills: 3 | Status: SHIPPED | OUTPATIENT
Start: 2025-03-14

## 2025-03-14 NOTE — TELEPHONE ENCOUNTER
Verified name and .    Patient states that pharmacy advised her that they did not receive the prescription for Triameterene-hydrochlorothiazide sent by Dr. Ramos in 2025.      Existing prescription re-sent.     Disp Refills Start End    Triamterene-HCTZ 37.5-25 MG Oral Tab 90 tablet 3 3/14/2025 --    Sig: TAKE 1/4 TABLET BY MOUTH EVERY MORNING    Sent to pharmacy as: Triamterene-HCTZ 37.5-25 MG Oral Tablet (MAXZIDE-25)    E-Prescribing Status: Receipt confirmed by pharmacy (3/14/2025  9:09 AM CDT)      Pharmacy    [id number] - [place], [place] - [address] [place] [address] AT [address], [phone], [phone]

## 2025-05-20 DIAGNOSIS — R93.89 ABNORMAL ULTRASOUND: Primary | ICD-10-CM

## 2025-06-04 DIAGNOSIS — R93.89 ABNORMAL ULTRASOUND: Primary | ICD-10-CM

## 2025-06-11 ENCOUNTER — TELEPHONE (OUTPATIENT)
Dept: FAMILY MEDICINE CLINIC | Facility: CLINIC | Age: 65
End: 2025-06-11

## 2025-06-11 ENCOUNTER — PATIENT MESSAGE (OUTPATIENT)
Dept: INTERNAL MEDICINE CLINIC | Facility: CLINIC | Age: 65
End: 2025-06-11

## 2025-06-11 NOTE — TELEPHONE ENCOUNTER
Patient very upset with recent cancellations with Dr Ramos she was not offered any other appointment options, per patient- healthcare is not the same anymore. I apologized to patient for the rescheduling and all the inconvenience offered an little 10/21/25@ 5:30 seemed happy with the time and day she had no further questions at this time.

## (undated) DIAGNOSIS — Z11.59 ENCOUNTER FOR SCREENING FOR OTHER VIRAL DISEASES: ICD-10-CM

## (undated) DIAGNOSIS — R11.0 NAUSEA: Primary | ICD-10-CM

## (undated) DIAGNOSIS — R63.4 WEIGHT LOSS: ICD-10-CM

## (undated) DIAGNOSIS — R19.5 DARK STOOLS: ICD-10-CM

## (undated) DIAGNOSIS — E87.6 LOW BLOOD POTASSIUM: ICD-10-CM

## (undated) DIAGNOSIS — R07.89 CHEST TIGHTNESS: ICD-10-CM

## (undated) DIAGNOSIS — M79.10 MYALGIA: Primary | ICD-10-CM

## (undated) DIAGNOSIS — K59.00 CONSTIPATION, UNSPECIFIED CONSTIPATION TYPE: ICD-10-CM

## (undated) DIAGNOSIS — K59.00 CONSTIPATION, UNSPECIFIED CONSTIPATION TYPE: Primary | ICD-10-CM

## (undated) DIAGNOSIS — R21 RASH: Primary | ICD-10-CM

## (undated) DIAGNOSIS — R10.13 EPIGASTRIC PAIN: Primary | ICD-10-CM

## (undated) DIAGNOSIS — R52 PAIN: Primary | ICD-10-CM

## (undated) DIAGNOSIS — Z01.818 PRE-PROCEDURAL EXAMINATION: Primary | ICD-10-CM

## (undated) DIAGNOSIS — R07.9 CHEST PAIN, UNSPECIFIED TYPE: ICD-10-CM

## (undated) DIAGNOSIS — R19.8 CHANGE IN BOWEL MOVEMENT: ICD-10-CM

## (undated) DIAGNOSIS — R10.11 RUQ PAIN: ICD-10-CM

## (undated) NOTE — MR AVS SNAPSHOT
[address] [facility] [address]  [phone]               Thank you for choosing us for your health care visit with Louis Beavers.  Jenelle Looney MD.  We are glad to serve you and happy to provide you with this summary of your v Wes Banks MD   [address] [place] [address]   Phone:  [phone]   Fax:  [phone]    Diagnoses:  Pain in both lower extremities   Order:  Physical Therapy - Internal          Referral Orders      Normal Orders This Visit    PHYSICAL least 30 minutes 3-4 times a week. Check blood pressures at different times on different days. Can purchase own blood pressure monitor. If not, check at local pharmacy. Bake foods more and grill occasionally. Avoid fried foods. No salt.  Use other seasoning This list is accurate as of: 5/15/17  2:36 PM.  Always use your most recent med list.                acyclovir 200 MG Caps   TK ONE C PO TID   Commonly known as:  ZOVIRAX           atenolol 25 MG Tabs   Take 1 tablet every 12 hours   Commonly known as:  TE Visits < Visit Summaries. MyChart questions? Call [phone] for help. [employer] is NOT to be used for urgent needs. For medical emergencies, dial 911.            Visit [email or web address] - Recur[facility] BackofficeGrand Lake Joint Township District [email or web address] online at  [email or web address]

## (undated) NOTE — ED AVS SNAPSHOT
Dara Dominguez   MRN: [id number]    Department:  [facility] Emergency Department   Date of Visit:  10/2/2018           Disclosure     Insurance plans vary and the physician(s) referred by the ER may not be covered by your plan.  Please contact y CARE PHYSICIAN AT ONCE OR RETURN IMMEDIATELY TO THE EMERGENCY DEPARTMENT. If you have been prescribed any medication(s), please fill your prescription right away and begin taking the medication(s) as directed.   If you believe that any of the medications

## (undated) NOTE — LETTER
[address] [place] [address]  Authorization for Surgical Operation or Procedure  Date: ______________       Time: _______________  1.  I hereby authorize Dr. Americo Cali , my physician and the assistant, to perform the following operation and 5. I consent to the photographing of the operations or procedures to be performed for the purposes of advancing medicine, science, and/or education, provided my identity is not revealed.  If the procedure has been videotaped, the physician/surgeon will obta risks and benefits involved in the proposed treatment and any reasonable alternative to the proposed treatment. I have also explained the risks and benefits involved in the refusal of the proposed treatment and have answered the patient's questions.  If I h